# Patient Record
Sex: FEMALE | Race: BLACK OR AFRICAN AMERICAN | NOT HISPANIC OR LATINO | Employment: OTHER | RURAL
[De-identification: names, ages, dates, MRNs, and addresses within clinical notes are randomized per-mention and may not be internally consistent; named-entity substitution may affect disease eponyms.]

---

## 2017-01-09 LAB — CRC RECOMMENDATION EXT: NORMAL

## 2020-06-02 ENCOUNTER — HISTORICAL (OUTPATIENT)
Dept: ADMINISTRATIVE | Facility: HOSPITAL | Age: 70
End: 2020-06-02

## 2020-06-03 LAB
LAB AP CLINICAL INFORMATION: NORMAL
LAB AP DIAGNOSIS - HISTORICAL: NORMAL
LAB AP GROSS PATHOLOGY - HISTORICAL: NORMAL
LAB AP SPECIMEN SUBMITTED - HISTORICAL: NORMAL

## 2020-06-15 ENCOUNTER — HISTORICAL (OUTPATIENT)
Dept: ADMINISTRATIVE | Facility: HOSPITAL | Age: 70
End: 2020-06-15

## 2021-06-21 ENCOUNTER — HOSPITAL ENCOUNTER (OUTPATIENT)
Dept: RADIOLOGY | Facility: HOSPITAL | Age: 71
Discharge: HOME | End: 2021-06-21
Payer: MEDICARE

## 2021-06-21 VITALS — WEIGHT: 217 LBS | BODY MASS INDEX: 34.87 KG/M2 | HEIGHT: 66 IN

## 2021-06-21 DIAGNOSIS — Z12.39 SCREENING FOR BREAST CANCER: ICD-10-CM

## 2021-06-21 PROCEDURE — 77067 SCR MAMMO BI INCL CAD: CPT | Mod: TC

## 2022-08-04 RX ORDER — HYDROCHLOROTHIAZIDE 25 MG/1
25 TABLET ORAL DAILY
COMMUNITY
End: 2022-08-08 | Stop reason: SDUPTHER

## 2022-08-04 RX ORDER — VERAPAMIL HYDROCHLORIDE 240 MG/1
240 TABLET, FILM COATED, EXTENDED RELEASE ORAL DAILY
COMMUNITY
End: 2022-08-08 | Stop reason: SDUPTHER

## 2022-08-04 RX ORDER — CLOBETASOL PROPIONATE 0.5 MG/G
CREAM TOPICAL 2 TIMES DAILY
COMMUNITY
End: 2023-03-27 | Stop reason: SDUPTHER

## 2022-08-04 RX ORDER — OMEPRAZOLE 20 MG/1
20 CAPSULE, DELAYED RELEASE ORAL DAILY
COMMUNITY
End: 2022-08-08 | Stop reason: DRUGHIGH

## 2022-08-04 RX ORDER — GLIPIZIDE 2.5 MG/1
5 TABLET, EXTENDED RELEASE ORAL
COMMUNITY
End: 2022-08-08 | Stop reason: SDUPTHER

## 2022-08-04 RX ORDER — FLUTICASONE PROPIONATE 50 MCG
2 SPRAY, SUSPENSION (ML) NASAL DAILY
COMMUNITY
End: 2022-08-08 | Stop reason: SDUPTHER

## 2022-08-04 RX ORDER — LORATADINE 10 MG/1
10 TABLET ORAL DAILY
COMMUNITY
End: 2022-08-08

## 2022-08-04 RX ORDER — POTASSIUM CHLORIDE 750 MG/1
10 TABLET, EXTENDED RELEASE ORAL DAILY
COMMUNITY
End: 2022-08-08 | Stop reason: SDUPTHER

## 2022-08-04 RX ORDER — LISINOPRIL 10 MG/1
10 TABLET ORAL DAILY
COMMUNITY
End: 2022-08-08 | Stop reason: SDUPTHER

## 2022-08-04 RX ORDER — IBUPROFEN 800 MG/1
800 TABLET ORAL 3 TIMES DAILY PRN
COMMUNITY
End: 2022-10-14

## 2022-08-04 RX ORDER — PRAVASTATIN SODIUM 20 MG/1
20 TABLET ORAL NIGHTLY
COMMUNITY
End: 2022-08-08 | Stop reason: SDUPTHER

## 2022-08-04 RX ORDER — POLYETHYLENE GLYCOL 3350 17 G/17G
17 POWDER, FOR SOLUTION ORAL DAILY
COMMUNITY
End: 2024-03-26 | Stop reason: SDUPTHER

## 2022-08-04 RX ORDER — ASPIRIN 81 MG/1
81 TABLET ORAL DAILY
COMMUNITY
End: 2024-03-26 | Stop reason: SDUPTHER

## 2022-08-04 RX ORDER — ONDANSETRON 4 MG/1
4 TABLET, FILM COATED ORAL EVERY 8 HOURS PRN
COMMUNITY
End: 2022-08-08 | Stop reason: ALTCHOICE

## 2022-08-08 ENCOUNTER — OFFICE VISIT (OUTPATIENT)
Dept: FAMILY MEDICINE | Facility: CLINIC | Age: 72
End: 2022-08-08
Payer: OTHER GOVERNMENT

## 2022-08-08 VITALS
WEIGHT: 209 LBS | HEIGHT: 66 IN | BODY MASS INDEX: 33.59 KG/M2 | DIASTOLIC BLOOD PRESSURE: 85 MMHG | SYSTOLIC BLOOD PRESSURE: 130 MMHG | RESPIRATION RATE: 16 BRPM | HEART RATE: 85 BPM

## 2022-08-08 DIAGNOSIS — E78.5 HYPERLIPIDEMIA, UNSPECIFIED HYPERLIPIDEMIA TYPE: ICD-10-CM

## 2022-08-08 DIAGNOSIS — E11.9 TYPE 2 DIABETES MELLITUS WITHOUT COMPLICATION, WITHOUT LONG-TERM CURRENT USE OF INSULIN: ICD-10-CM

## 2022-08-08 DIAGNOSIS — Z11.59 SCREENING FOR VIRAL DISEASE: ICD-10-CM

## 2022-08-08 DIAGNOSIS — I10 HYPERTENSION, UNSPECIFIED TYPE: Primary | ICD-10-CM

## 2022-08-08 LAB
ALBUMIN SERPL BCP-MCNC: 4 G/DL (ref 3.5–5)
ALBUMIN/GLOB SERPL: 0.9 {RATIO}
ALP SERPL-CCNC: 70 U/L (ref 55–142)
ALT SERPL W P-5'-P-CCNC: 24 U/L (ref 13–56)
ANION GAP SERPL CALCULATED.3IONS-SCNC: 11 MMOL/L (ref 7–16)
AST SERPL W P-5'-P-CCNC: 16 U/L (ref 15–37)
BASOPHILS # BLD AUTO: 0.04 K/UL (ref 0–0.2)
BASOPHILS NFR BLD AUTO: 0.6 % (ref 0–1)
BILIRUB SERPL-MCNC: 0.7 MG/DL (ref 0–1.2)
BUN SERPL-MCNC: 14 MG/DL (ref 7–18)
BUN/CREAT SERPL: 14 (ref 6–20)
CALCIUM SERPL-MCNC: 9.3 MG/DL (ref 8.5–10.1)
CHLORIDE SERPL-SCNC: 106 MMOL/L (ref 98–107)
CHOLEST SERPL-MCNC: 162 MG/DL (ref 0–200)
CHOLEST/HDLC SERPL: 2.7 {RATIO}
CO2 SERPL-SCNC: 27 MMOL/L (ref 21–32)
CREAT SERPL-MCNC: 0.99 MG/DL (ref 0.55–1.02)
DIFFERENTIAL METHOD BLD: ABNORMAL
EGFR (NO RACE VARIABLE) (RUSH/TITUS): 61 ML/MIN/1.73M²
EOSINOPHIL # BLD AUTO: 0.02 K/UL (ref 0–0.5)
EOSINOPHIL NFR BLD AUTO: 0.3 % (ref 1–4)
ERYTHROCYTE [DISTWIDTH] IN BLOOD BY AUTOMATED COUNT: 12.1 % (ref 11.5–14.5)
GLOBULIN SER-MCNC: 4.5 G/DL (ref 2–4)
GLUCOSE SERPL-MCNC: 126 MG/DL (ref 74–106)
HCT VFR BLD AUTO: 43.7 % (ref 38–47)
HCV AB SER QL: NORMAL
HDLC SERPL-MCNC: 60 MG/DL (ref 40–60)
HGB BLD-MCNC: 14.6 G/DL (ref 12–16)
IMM GRANULOCYTES # BLD AUTO: 0.02 K/UL (ref 0–0.04)
IMM GRANULOCYTES NFR BLD: 0.3 % (ref 0–0.4)
LDLC SERPL CALC-MCNC: 88 MG/DL
LDLC/HDLC SERPL: 1.5 {RATIO}
LYMPHOCYTES # BLD AUTO: 2.09 K/UL (ref 1–4.8)
LYMPHOCYTES NFR BLD AUTO: 31.2 % (ref 27–41)
MCH RBC QN AUTO: 31.2 PG (ref 27–31)
MCHC RBC AUTO-ENTMCNC: 33.4 G/DL (ref 32–36)
MCV RBC AUTO: 93.4 FL (ref 80–96)
MONOCYTES # BLD AUTO: 0.33 K/UL (ref 0–0.8)
MONOCYTES NFR BLD AUTO: 4.9 % (ref 2–6)
MPC BLD CALC-MCNC: 11.1 FL (ref 9.4–12.4)
NEUTROPHILS # BLD AUTO: 4.2 K/UL (ref 1.8–7.7)
NEUTROPHILS NFR BLD AUTO: 62.7 % (ref 53–65)
NONHDLC SERPL-MCNC: 102 MG/DL
NRBC # BLD AUTO: 0 X10E3/UL
NRBC, AUTO (.00): 0 %
PLATELET # BLD AUTO: 278 K/UL (ref 150–400)
POTASSIUM SERPL-SCNC: 3.8 MMOL/L (ref 3.5–5.1)
PROT SERPL-MCNC: 8.5 G/DL (ref 6.4–8.2)
RBC # BLD AUTO: 4.68 M/UL (ref 4.2–5.4)
SODIUM SERPL-SCNC: 140 MMOL/L (ref 136–145)
TRIGL SERPL-MCNC: 68 MG/DL (ref 35–150)
VLDLC SERPL-MCNC: 14 MG/DL
WBC # BLD AUTO: 6.7 K/UL (ref 4.5–11)

## 2022-08-08 PROCEDURE — 82570 MICROALBUMIN / CREATININE RATIO URINE: ICD-10-PCS | Mod: ,,, | Performed by: CLINICAL MEDICAL LABORATORY

## 2022-08-08 PROCEDURE — 80053 COMPREHENSIVE METABOLIC PANEL: ICD-10-PCS | Mod: ,,, | Performed by: CLINICAL MEDICAL LABORATORY

## 2022-08-08 PROCEDURE — 80053 COMPREHEN METABOLIC PANEL: CPT | Mod: ,,, | Performed by: CLINICAL MEDICAL LABORATORY

## 2022-08-08 PROCEDURE — 85025 CBC WITH DIFFERENTIAL: ICD-10-PCS | Mod: ,,, | Performed by: CLINICAL MEDICAL LABORATORY

## 2022-08-08 PROCEDURE — 83036 HEMOGLOBIN GLYCOSYLATED A1C: CPT | Mod: ,,, | Performed by: CLINICAL MEDICAL LABORATORY

## 2022-08-08 PROCEDURE — 86803 HEPATITIS C AB TEST: CPT | Mod: ,,, | Performed by: CLINICAL MEDICAL LABORATORY

## 2022-08-08 PROCEDURE — 82043 UR ALBUMIN QUANTITATIVE: CPT | Mod: ,,, | Performed by: CLINICAL MEDICAL LABORATORY

## 2022-08-08 PROCEDURE — 99203 OFFICE O/P NEW LOW 30 MIN: CPT | Mod: ,,, | Performed by: NURSE PRACTITIONER

## 2022-08-08 PROCEDURE — 80061 LIPID PANEL: ICD-10-PCS | Mod: ,,, | Performed by: CLINICAL MEDICAL LABORATORY

## 2022-08-08 PROCEDURE — 99203 PR OFFICE/OUTPT VISIT, NEW, LEVL III, 30-44 MIN: ICD-10-PCS | Mod: ,,, | Performed by: NURSE PRACTITIONER

## 2022-08-08 PROCEDURE — 85025 COMPLETE CBC W/AUTO DIFF WBC: CPT | Mod: ,,, | Performed by: CLINICAL MEDICAL LABORATORY

## 2022-08-08 PROCEDURE — 82043 MICROALBUMIN / CREATININE RATIO URINE: ICD-10-PCS | Mod: ,,, | Performed by: CLINICAL MEDICAL LABORATORY

## 2022-08-08 PROCEDURE — 82570 ASSAY OF URINE CREATININE: CPT | Mod: ,,, | Performed by: CLINICAL MEDICAL LABORATORY

## 2022-08-08 PROCEDURE — 83036 HEMOGLOBIN A1C: ICD-10-PCS | Mod: ,,, | Performed by: CLINICAL MEDICAL LABORATORY

## 2022-08-08 PROCEDURE — 80061 LIPID PANEL: CPT | Mod: ,,, | Performed by: CLINICAL MEDICAL LABORATORY

## 2022-08-08 PROCEDURE — 86803 HEPATITIS C ANTIBODY: ICD-10-PCS | Mod: ,,, | Performed by: CLINICAL MEDICAL LABORATORY

## 2022-08-08 RX ORDER — PRAVASTATIN SODIUM 20 MG/1
20 TABLET ORAL NIGHTLY
Qty: 90 TABLET | Refills: 1 | Status: SHIPPED | OUTPATIENT
Start: 2022-08-08 | End: 2022-09-08 | Stop reason: SDUPTHER

## 2022-08-08 RX ORDER — OMEPRAZOLE 20 MG/1
20 CAPSULE, DELAYED RELEASE ORAL DAILY
Qty: 90 CAPSULE | Refills: 1 | Status: CANCELLED | OUTPATIENT
Start: 2022-08-08

## 2022-08-08 RX ORDER — VERAPAMIL HYDROCHLORIDE 240 MG/1
240 TABLET, FILM COATED, EXTENDED RELEASE ORAL DAILY
Qty: 90 TABLET | Refills: 1 | Status: SHIPPED | OUTPATIENT
Start: 2022-08-08 | End: 2022-09-08 | Stop reason: SDUPTHER

## 2022-08-08 RX ORDER — LORATADINE 10 MG/1
10 TABLET ORAL DAILY
Qty: 90 TABLET | Refills: 1 | Status: SHIPPED | OUTPATIENT
Start: 2022-08-08 | End: 2022-09-08 | Stop reason: SDUPTHER

## 2022-08-08 RX ORDER — LISINOPRIL 10 MG/1
10 TABLET ORAL DAILY
Qty: 90 TABLET | Refills: 1 | Status: SHIPPED | OUTPATIENT
Start: 2022-08-08 | End: 2022-09-08 | Stop reason: SDUPTHER

## 2022-08-08 RX ORDER — FLUTICASONE PROPIONATE 50 MCG
2 SPRAY, SUSPENSION (ML) NASAL DAILY
Qty: 16 G | Refills: 2 | Status: SHIPPED | OUTPATIENT
Start: 2022-08-08 | End: 2022-09-08 | Stop reason: SDUPTHER

## 2022-08-08 RX ORDER — PANTOPRAZOLE SODIUM 40 MG/1
40 TABLET, DELAYED RELEASE ORAL DAILY
Qty: 90 TABLET | Refills: 1 | Status: SHIPPED | OUTPATIENT
Start: 2022-08-08 | End: 2022-09-08 | Stop reason: SDUPTHER

## 2022-08-08 RX ORDER — POTASSIUM CHLORIDE 750 MG/1
10 TABLET, EXTENDED RELEASE ORAL DAILY
Qty: 90 TABLET | Refills: 1 | Status: SHIPPED | OUTPATIENT
Start: 2022-08-08 | End: 2022-09-08 | Stop reason: SDUPTHER

## 2022-08-08 RX ORDER — GLIPIZIDE 2.5 MG/1
5 TABLET, EXTENDED RELEASE ORAL
Qty: 90 TABLET | Refills: 1 | Status: SHIPPED | OUTPATIENT
Start: 2022-08-08 | End: 2022-09-08 | Stop reason: SDUPTHER

## 2022-08-08 RX ORDER — HYDROCHLOROTHIAZIDE 25 MG/1
25 TABLET ORAL DAILY
Qty: 90 TABLET | Refills: 1 | Status: SHIPPED | OUTPATIENT
Start: 2022-08-08 | End: 2022-09-08 | Stop reason: SDUPTHER

## 2022-08-08 NOTE — PROGRESS NOTES
TU Sanchez   [facility] [address] [place] [address]  [phone]      PATIENT NAME: Judy Miller  : 1950  DATE: 22  MRN: [id number]      Billing Provider: TU Sanchez  Level of Service:   Patient PCP Information     Provider PCP Type    TU Sanchez General          Reason for Visit / Chief Complaint: Establish Care, Hypertension, Diabetes, and Hyperlipidemia (Here to establish care)       Update PCP  Update Chief Complaint         History of Present Illness / Problem Focused Workflow     Judy Miller presents to the clinic with Establish Care, Hypertension, Diabetes, and Hyperlipidemia (Here to establish care)     Pt presents to establish care. She was previously followed by Dr Whitt but is wanting to transition her care to our clinic. She has no complaints at this time. Her home meds and medical history were reviewed.    PMH:  htn  hld  Dm  No hx renal failure  No hx cad/mi or cva    PSH  Hip replacement years ago    Social:  Recently lost her  of 54yrs   Has 4 children all live out of town  Denies tobacco use  Denies ETOH  Denies drugs    Family Hx:  Mother  age 79 of CAD  Father  at 88 of MI    Health Maintenance  Reports had a colonoscopy 6yrs ago at [facility] (repeat due in 4 years)  Reports previous Dexa Scan at [facility] (date unknown)  MMG scheduled next week at [facility]        Review of Systems     Review of Systems   Constitutional: Positive for fatigue.   Eyes: Negative for visual disturbance.   Respiratory: Negative for chest tightness and shortness of breath.    Cardiovascular: Negative for chest pain and leg swelling.   Gastrointestinal: Positive for reflux. Negative for abdominal pain, change in bowel habit and change in bowel habit.   Endocrine: Negative for polydipsia, polyphagia and polyuria.   Genitourinary: Negative for dysuria and hematuria.    Musculoskeletal: Negative for back pain and leg pain.   Neurological: Negative for dizziness, syncope, weakness and light-headedness.        Medical / Social / Family History     Past Medical History:   Diagnosis Date    Diabetes mellitus, type 2     GERD (gastroesophageal reflux disease)     Hyperlipidemia     Hypertension        Past Surgical History:   Procedure Laterality Date    HIP REPLACEMENT ARTHROPLASTY Left        Social History  Ms. Miller  reports that she has never smoked. She has never used smokeless tobacco. She reports that she does not drink alcohol and does not use drugs.    Family History  Ms. Miller's family history includes Diabetes in her mother and sister; Heart disease in her mother; Hypertension in her mother.    Medications and Allergies     Medications  Outpatient Medications Marked as Taking for the 8/8/22 encounter (Office Visit) with TU Sanchez   Medication Sig Dispense Refill    aspirin (ECOTRIN) 81 MG EC tablet Take 81 mg by mouth once daily.      clobetasoL (TEMOVATE) 0.05 % cream Apply topically 2 (two) times daily. Apply a thin layer to the affected area(s)      ibuprofen (ADVIL,MOTRIN) 800 MG tablet Take 800 mg by mouth 3 (three) times daily as needed for Pain (Do not take with Naproxen).      polyethylene glycol (GLYCOLAX) 17 gram PwPk Take 17 g by mouth once daily. Mixed with 8 oz water, juice, soda, coffee or tea      [DISCONTINUED] fluticasone propionate (FLONASE) 50 mcg/actuation nasal spray 2 sprays by Each Nostril route once daily.      [DISCONTINUED] glipiZIDE (GLUCOTROL) 2.5 MG TR24 Take 5 mg by mouth daily with breakfast.      [DISCONTINUED] hydroCHLOROthiazide (HYDRODIURIL) 25 MG tablet Take 25 mg by mouth once daily.      [DISCONTINUED] lisinopriL 10 MG tablet Take 10 mg by mouth once daily.      [DISCONTINUED] omeprazole (PRILOSEC) 20 MG capsule Take 20 mg by mouth once daily.      [DISCONTINUED] potassium chloride SA (K-DUR,KLOR-CON M) 10  MEQ tablet Take 10 mEq by mouth once daily. With food      [DISCONTINUED] pravastatin (PRAVACHOL) 20 MG tablet Take 20 mg by mouth every evening.      [DISCONTINUED] verapamiL (CALAN-SR) 240 MG CR tablet Take 240 mg by mouth once daily. With food         Allergies  Review of patient's allergies indicates:   Allergen Reactions    Pneumococcal vaccine Swelling       Physical Examination     Vitals:    08/08/22 1003   BP: 130/85   Pulse: 85   Resp: 16     Physical Exam  Eyes:      Pupils: Pupils are equal, round, and reactive to light.   Cardiovascular:      Rate and Rhythm: Normal rate and regular rhythm.      Heart sounds: Normal heart sounds. No murmur heard.  Pulmonary:      Breath sounds: Normal breath sounds. No wheezing, rhonchi or rales.   Abdominal:      General: Bowel sounds are normal.   Musculoskeletal:         General: No swelling.      Cervical back: Normal range of motion and neck supple.   Skin:     General: Skin is warm and dry.   Neurological:      Mental Status: She is alert and oriented to person, place, and time.          Assessment and Plan (including Health Maintenance)      Problem List  Smart Sets  Document Outside HM   :    Plan:         Health Maintenance Due   Topic Date Due    Hepatitis C Screening  Never done    Lipid Panel  Never done    DEXA Scan  Never done    Colorectal Cancer Screening  Never done    Shingles Vaccine (1 of 2) Never done    Pneumococcal Vaccines (Age 65+) (1 - PCV) Never done    COVID-19 Vaccine (4 - Booster for Moderna series) 03/05/2022    Mammogram  06/21/2022       Problem List Items Addressed This Visit    None     Visit Diagnoses     Hypertension, unspecified type    -  Primary    bp appears well controlled today   cont home meds     Relevant Orders    CBC Auto Differential    Comprehensive Metabolic Panel    Hyperlipidemia, unspecified hyperlipidemia type        cont home meds  lab today     Relevant Orders    Lipid Panel    Type 2 diabetes mellitus  without complication, without long-term current use of insulin        lab today   cont home meds    Relevant Medications    glipiZIDE (GLUCOTROL) 2.5 MG TR24    Other Relevant Orders    Hemoglobin A1C    Microalbumin/Creatinine Ratio, Urine    Screening for viral disease        Relevant Orders    Hepatitis C Antibody          Health Maintenance Topics with due status: Not Due       Topic Last Completion Date    TETANUS VACCINE 01/04/2013    Influenza Vaccine Not Due       Future Appointments   Date Time Provider Department Center   8/15/2022  1:00 PM [facility] [place]   9/1/2022  8:00 AM AWV NURSE, [facility] [place]   11/28/2022  8:20 AM TU Sanchez [facility] [place]            Signature:  TU Sanchez  [facility] [address] [place] [address]  [phone]    Date of encounter: 8/8/22

## 2022-08-09 LAB
CREAT UR-MCNC: 81 MG/DL (ref 28–219)
EST. AVERAGE GLUCOSE BLD GHB EST-MCNC: 90 MG/DL
HBA1C MFR BLD HPLC: 5.3 % (ref 4.5–6.6)
MICROALBUMIN UR-MCNC: 0.7 MG/DL (ref 0–2.8)
MICROALBUMIN/CREAT RATIO PNL UR: 8.6 MG/G (ref 0–30)

## 2022-08-15 ENCOUNTER — HOSPITAL ENCOUNTER (OUTPATIENT)
Dept: RADIOLOGY | Facility: HOSPITAL | Age: 72
Discharge: HOME | End: 2022-08-15
Attending: RADIOLOGY
Payer: MEDICARE

## 2022-08-15 DIAGNOSIS — Z12.31 VISIT FOR SCREENING MAMMOGRAM: ICD-10-CM

## 2022-08-15 PROCEDURE — 77067 SCR MAMMO BI INCL CAD: CPT | Mod: TC

## 2022-08-22 ENCOUNTER — PATIENT OUTREACH (OUTPATIENT)
Dept: FAMILY MEDICINE | Facility: CLINIC | Age: 72
End: 2022-08-22
Payer: MEDICARE

## 2022-09-01 ENCOUNTER — OFFICE VISIT (OUTPATIENT)
Dept: FAMILY MEDICINE | Facility: CLINIC | Age: 72
End: 2022-09-01
Payer: MEDICARE

## 2022-09-01 VITALS
DIASTOLIC BLOOD PRESSURE: 80 MMHG | WEIGHT: 213 LBS | HEIGHT: 66 IN | OXYGEN SATURATION: 96 % | BODY MASS INDEX: 34.23 KG/M2 | HEART RATE: 61 BPM | RESPIRATION RATE: 20 BRPM | SYSTOLIC BLOOD PRESSURE: 138 MMHG | TEMPERATURE: 98 F

## 2022-09-01 DIAGNOSIS — E66.3 OVERWEIGHT: ICD-10-CM

## 2022-09-01 DIAGNOSIS — E11.9 TYPE 2 DIABETES MELLITUS WITHOUT COMPLICATION, WITHOUT LONG-TERM CURRENT USE OF INSULIN: ICD-10-CM

## 2022-09-01 DIAGNOSIS — I10 HYPERTENSION, UNSPECIFIED TYPE: ICD-10-CM

## 2022-09-01 DIAGNOSIS — Z00.00 ENCOUNTER FOR SUBSEQUENT ANNUAL WELLNESS VISIT (AWV) IN MEDICARE PATIENT: Primary | ICD-10-CM

## 2022-09-01 DIAGNOSIS — E78.5 HYPERLIPIDEMIA, UNSPECIFIED HYPERLIPIDEMIA TYPE: ICD-10-CM

## 2022-09-01 PROCEDURE — 1126F PR PAIN SEVERITY QUANTIFIED, NO PAIN PRESENT: ICD-10-PCS | Mod: ,,, | Performed by: NURSE PRACTITIONER

## 2022-09-01 PROCEDURE — 1160F PR REVIEW ALL MEDS BY PRESCRIBER/CLIN PHARMACIST DOCUMENTED: ICD-10-PCS | Mod: ,,, | Performed by: NURSE PRACTITIONER

## 2022-09-01 PROCEDURE — 4010F PR ACE/ARB THEARPY RXD/TAKEN: ICD-10-PCS | Mod: ,,, | Performed by: NURSE PRACTITIONER

## 2022-09-01 PROCEDURE — 3008F PR BODY MASS INDEX (BMI) DOCUMENTED: ICD-10-PCS | Mod: ,,, | Performed by: NURSE PRACTITIONER

## 2022-09-01 PROCEDURE — 3044F HG A1C LEVEL LT 7.0%: CPT | Mod: ,,, | Performed by: NURSE PRACTITIONER

## 2022-09-01 PROCEDURE — 1160F RVW MEDS BY RX/DR IN RCRD: CPT | Mod: ,,, | Performed by: NURSE PRACTITIONER

## 2022-09-01 PROCEDURE — 1126F AMNT PAIN NOTED NONE PRSNT: CPT | Mod: ,,, | Performed by: NURSE PRACTITIONER

## 2022-09-01 PROCEDURE — 3075F PR MOST RECENT SYSTOLIC BLOOD PRESS GE 130-139MM HG: ICD-10-PCS | Mod: ,,, | Performed by: NURSE PRACTITIONER

## 2022-09-01 PROCEDURE — 3008F BODY MASS INDEX DOCD: CPT | Mod: ,,, | Performed by: NURSE PRACTITIONER

## 2022-09-01 PROCEDURE — 3075F SYST BP GE 130 - 139MM HG: CPT | Mod: ,,, | Performed by: NURSE PRACTITIONER

## 2022-09-01 PROCEDURE — 1101F PT FALLS ASSESS-DOCD LE1/YR: CPT | Mod: ,,, | Performed by: NURSE PRACTITIONER

## 2022-09-01 PROCEDURE — 1159F MED LIST DOCD IN RCRD: CPT | Mod: ,,, | Performed by: NURSE PRACTITIONER

## 2022-09-01 PROCEDURE — 3079F DIAST BP 80-89 MM HG: CPT | Mod: ,,, | Performed by: NURSE PRACTITIONER

## 2022-09-01 PROCEDURE — 3066F NEPHROPATHY DOC TX: CPT | Mod: ,,, | Performed by: NURSE PRACTITIONER

## 2022-09-01 PROCEDURE — 3079F PR MOST RECENT DIASTOLIC BLOOD PRESSURE 80-89 MM HG: ICD-10-PCS | Mod: ,,, | Performed by: NURSE PRACTITIONER

## 2022-09-01 PROCEDURE — 3288F FALL RISK ASSESSMENT DOCD: CPT | Mod: ,,, | Performed by: NURSE PRACTITIONER

## 2022-09-01 PROCEDURE — G0439 PR MEDICARE ANNUAL WELLNESS SUBSEQUENT VISIT: ICD-10-PCS | Mod: ,,, | Performed by: NURSE PRACTITIONER

## 2022-09-01 PROCEDURE — G0439 PPPS, SUBSEQ VISIT: HCPCS | Mod: ,,, | Performed by: NURSE PRACTITIONER

## 2022-09-01 PROCEDURE — 1159F PR MEDICATION LIST DOCUMENTED IN MEDICAL RECORD: ICD-10-PCS | Mod: ,,, | Performed by: NURSE PRACTITIONER

## 2022-09-01 PROCEDURE — 4010F ACE/ARB THERAPY RXD/TAKEN: CPT | Mod: ,,, | Performed by: NURSE PRACTITIONER

## 2022-09-01 PROCEDURE — 3044F PR MOST RECENT HEMOGLOBIN A1C LEVEL <7.0%: ICD-10-PCS | Mod: ,,, | Performed by: NURSE PRACTITIONER

## 2022-09-01 PROCEDURE — 3066F PR DOCUMENTATION OF TREATMENT FOR NEPHROPATHY: ICD-10-PCS | Mod: ,,, | Performed by: NURSE PRACTITIONER

## 2022-09-01 PROCEDURE — 3061F PR NEG MICROALBUMINURIA RESULT DOCUMENTED/REVIEW: ICD-10-PCS | Mod: ,,, | Performed by: NURSE PRACTITIONER

## 2022-09-01 PROCEDURE — 1101F PR PT FALLS ASSESS DOC 0-1 FALLS W/OUT INJ PAST YR: ICD-10-PCS | Mod: ,,, | Performed by: NURSE PRACTITIONER

## 2022-09-01 PROCEDURE — 3288F PR FALLS RISK ASSESSMENT DOCUMENTED: ICD-10-PCS | Mod: ,,, | Performed by: NURSE PRACTITIONER

## 2022-09-01 PROCEDURE — 3061F NEG MICROALBUMINURIA REV: CPT | Mod: ,,, | Performed by: NURSE PRACTITIONER

## 2022-09-01 NOTE — PATIENT INSTRUCTIONS
Counseling and Referral of Other Preventative  (Italic type indicates deductible and co-insurance are waived)    Patient Name: Judy Miller  Today's Date: 9/1/2022    Health Maintenance       Date Due Completion Date    DEXA Scan 09/01/2022 (Originally 2/3/1991) ---    Influenza Vaccine (1) 10/01/2022 (Originally 9/1/2022) ---    Shingles Vaccine (1 of 2) 12/01/2022 (Originally 2/3/2001) ---    COVID-19 Vaccine (4 - Booster for Moderna series) 09/01/2023 (Originally 3/5/2022) 11/5/2021    TETANUS VACCINE 01/04/2023 1/4/2013    Mammogram 08/15/2023 8/15/2022    Lipid Panel 08/08/2027 8/8/2022    Colorectal Cancer Screening 08/20/2027 1/9/2017        No orders of the defined types were placed in this encounter.    The following information is provided to all patients.  This information is to help you find resources for any of the problems found today that may be affecting your health:                Living healthy guide: www.[facility].gov      Understanding Diabetes: www.diabetes.org      Eating healthy: www.cdc.gov/healthyweight      CDC home safety checklist: www.cdc.gov/steadi/patient.html      Agency on Aging: www.goea.[place].[facility]      Alcoholics anonymous (AA): www.aa.org      Physical Activity: www.key.nih.gov/qd9qwzy      Tobacco use: www.quitwithusla.org

## 2022-09-01 NOTE — PROGRESS NOTES
[place] [facility]       PATIENT NAME: Judy Miller   : 1950    AGE: 72 y.o. DATE: 2022   MRN: [id number]        Reason for Visit / Chief Complaint: Medicare AWV Follow Up ([employer] WELLNESS SUBSEQUENT VISIT)        Judy Miller presents for an Subsequent Medicare AWV today.     The following components were reviewed and updated:    Medical/Social/Family History:  Past Medical History:   Diagnosis Date    Diabetes mellitus, type 2     GERD (gastroesophageal reflux disease)     Hyperlipidemia     Hypertension         Family History   Problem Relation Age of Onset    Hypertension Mother     Heart disease Mother     Diabetes Mother     Diabetes Sister         Social History     Tobacco Use   Smoking Status Never   Smokeless Tobacco Never      Social History     Substance and Sexual Activity   Alcohol Use Not Currently    Comment: Occasional use       Family History   Problem Relation Age of Onset    Hypertension Mother     Heart disease Mother     Diabetes Mother     Diabetes Sister        Past Surgical History:   Procedure Laterality Date    HIP REPLACEMENT ARTHROPLASTY Left     TUBAL LIGATION           Allergies and Current Medications     Review of patient's allergies indicates:   Allergen Reactions    Pneumococcal vaccine Swelling       Current Outpatient Medications:     aspirin (ECOTRIN) 81 MG EC tablet, Take 81 mg by mouth once daily., Disp: , Rfl:     clobetasoL (TEMOVATE) 0.05 % cream, Apply topically 2 (two) times daily. Apply a thin layer to the affected area(s), Disp: , Rfl:     fluticasone propionate (FLONASE) 50 mcg/actuation nasal spray, 2 sprays (100 mcg total) by Each Nostril route once daily., Disp: 16 g, Rfl: 2    glipiZIDE (GLUCOTROL) 2.5 MG TR24, Take 2 tablets (5 mg total) by mouth daily with breakfast., Disp: 90 tablet, Rfl: 1    hydroCHLOROthiazide (HYDRODIURIL) 25 MG tablet, Take 1 tablet (25 mg total) by mouth once daily.,  Disp: 90 tablet, Rfl: 1    ibuprofen (ADVIL,MOTRIN) 800 MG tablet, Take 800 mg by mouth 3 (three) times daily as needed for Pain (Do not take with Naproxen)., Disp: , Rfl:     lisinopriL 10 MG tablet, Take 1 tablet (10 mg total) by mouth once daily., Disp: 90 tablet, Rfl: 1    loratadine (CLARITIN) 10 mg tablet, Take 1 tablet (10 mg total) by mouth once daily., Disp: 90 tablet, Rfl: 1    pantoprazole (PROTONIX) 40 MG tablet, Take 1 tablet (40 mg total) by mouth once daily., Disp: 90 tablet, Rfl: 1    polyethylene glycol (GLYCOLAX) 17 gram PwPk, Take 17 g by mouth once daily. Mixed with 8 oz water, juice, soda, coffee or tea, Disp: , Rfl:     potassium chloride SA (K-DUR,KLOR-CON M) 10 MEQ tablet, Take 1 tablet (10 mEq total) by mouth once daily. With food, Disp: 90 tablet, Rfl: 1    pravastatin (PRAVACHOL) 20 MG tablet, Take 1 tablet (20 mg total) by mouth every evening., Disp: 90 tablet, Rfl: 1    verapamiL (CALAN-SR) 240 MG CR tablet, Take 1 tablet (240 mg total) by mouth once daily. With food, Disp: 90 tablet, Rfl: 1    Health Risk Assessment   Fall Risk: No   Obesity: BMI 34.38     Advance Directive:  X  Patient has advanced directives written and agrees to provide copies to the institution.   Depression: PHQ9 -0    HTN: 138/80    T2DM: A1C- 5.3    Tobacco use: Denies tobacco use  STI: Not at risk    Alcohol misuse: Reports occasional use Cage Score: N/A   Statin Use: Continue statin use. Encouraged heart healthy diet & exercise   Mini Co/5      Health Risk Assessment  What is your age?: 70-79  Are you male or female?: Female  During the past four weeks, how much have you been bothered by emotional problems such as feeling anxious, depressed, irritable, sad, or downhearted and blue?: Not at all  During the past five weeks, has your physical and/or emotional health limited your social activities with family, friends, neighbors, or groups?: Not at all  During the past four weeks, how much bodily pain have you  generally had?: No pain  During the past four weeks, was someone available to help if you needed and wanted help?: Yes, as much as I wanted  During the past four weeks, what was the hardest physical activity you could do for at least two minutes?: Light  Can you get to places out of walking distance without help?  (For example, can you travel alone on buses or taxis, or drive your own car?): Yes  Can you go shopping for groceries or clothes without someone's help?: Yes  Can you prepare your own meals?: Yes  Can you do your own housework without help?: Yes  Because of any health problems, do you need the help of another person with your personal care needs such as eating, bathing, dressing, or getting around the house?: No  Can you handle your own money without help?: Yes  During the past four weeks, how would you rate your health in general?: Very good  How have things been going for you during the past four weeks?: Pretty well  Are you having difficulties driving your car?: No  Do you always fasten your seat belt when you are in a car?: Yes, usually  How often in the past four weeks have you been bothered by falling or dizzy when standing up?: Never  How often in the past four weeks have you been bothered by sexual problems?: Never  How often in the past four weeks have you been bothered by trouble eating well?: Never  How often in the past four weeks have you been bothered by teeth or denture problems?: Never  How often in the past four weeks have you been bothered with problems using the telephone?: Never  How often in the past four weeks have you been bothered by tiredness or fatigue?: Never  Have you fallen two or more times in the past year?: No  Are you afraid of falling?: No  Are you a smoker?: No  During the past four weeks, how many drinks of wine, beer, or other alcoholic beverages did you have?: One drink or less per week  Do you exercise for about 20 minutes three or more days a week?: Yes, some of the  time  Have you been given any information to help you with hazards in your house that might hurt you?: Yes  Have you been given any information to help you with keeping track of your medications?: Yes  How often do you have trouble taking medicines the way you've been told to take them?: I always take them as prescribed  How confident are you that you can control and manage most of your health problems?: Very confident  What is your race? (Check all that apply.):     Health Maintenance   Last eye exam: 2022   Last CV screen with lipids: 08/08/2022   Diabetes screening with fasting glucose or A1c: 08/08/2022   Colonoscopy: 01/09/2017- Repeat in 10 years. Due again 2027   Flu Vaccine: Out of season   Pneumonia vaccines: Allergy   COVID vaccine: 02/05/2021; 03/04/2021; 11/05/2021   Hep B vaccine:  Not at risk   DEXA: Discussed with pt but she declines at this time   Last pap/pelvic: Advanced age   Last Mammogram: 08/15/2022- Negative   Last PSA screen: N/A   AAA screening: N/A (once in lifetime for males 65-75 who have smoked > 100 cigarettes in lifetime)  HIV Screeing: N/A  Hepatitis C Screen: 08/08/2022- Nonreactive  Low Dose CT Scan: N/A    Health Maintenance Topics with due status: Not Due       Topic Last Completion Date    TETANUS VACCINE 01/04/2013    Colorectal Cancer Screening 01/09/2017    Lipid Panel 08/08/2022    Mammogram 08/15/2022     There are no preventive care reminders to display for this patient.    Incontinence  Bowel: No  Bladder: No    Lab results available in Epic or see dates from [facility] above:   Lab Results   Component Value Date    CHOL 162 08/08/2022     Lab Results   Component Value Date    HDL 60 08/08/2022     Lab Results   Component Value Date    LDLCALC 88 08/08/2022     Lab Results   Component Value Date    TRIG 68 08/08/2022     Lab Results   Component Value Date    CHOLHDL 2.7 08/08/2022       Lab Results   Component Value Date    HGBA1C 5.3 08/08/2022       Sodium  "  Date Value Ref Range Status   08/08/2022 140 136 - 145 mmol/L Final     Potassium   Date Value Ref Range Status   08/08/2022 3.8 3.5 - 5.1 mmol/L Final     Chloride   Date Value Ref Range Status   08/08/2022 106 98 - 107 mmol/L Final     CO2   Date Value Ref Range Status   08/08/2022 27 21 - 32 mmol/L Final     Glucose   Date Value Ref Range Status   08/08/2022 126 (H) 74 - 106 mg/dL Final     BUN   Date Value Ref Range Status   08/08/2022 14 7 - 18 mg/dL Final     Creatinine   Date Value Ref Range Status   08/08/2022 0.99 0.55 - 1.02 mg/dL Final     Calcium   Date Value Ref Range Status   08/08/2022 9.3 8.5 - 10.1 mg/dL Final     Total Protein   Date Value Ref Range Status   08/08/2022 8.5 (H) 6.4 - 8.2 g/dL Final     Albumin   Date Value Ref Range Status   08/08/2022 4.0 3.5 - 5.0 g/dL Final     Bilirubin, Total   Date Value Ref Range Status   08/08/2022 0.7 0.0 - 1.2 mg/dL Final     Alk Phos   Date Value Ref Range Status   08/08/2022 70 55 - 142 U/L Final     AST   Date Value Ref Range Status   08/08/2022 16 15 - 37 U/L Final     ALT   Date Value Ref Range Status   08/08/2022 24 13 - 56 U/L Final     Anion Gap   Date Value Ref Range Status   08/08/2022 11 7 - 16 mmol/L Final         Care Team   PCP: TU Mills           **See Completed Assessments for Annual Wellness visit within the encounter summary    The following assessments were completed & reviewed:  Depression Screening  Cognitive function Screening  Timed Get Up Test  Whisper Test  Vision Screen  Health Risk Assessment  Checklist of ADLs and IADLs      Objective  Vitals:    09/01/22 0814   BP: 138/80   Pulse: 61   Resp: 20   Temp: 98.2 °F (36.8 °C)   TempSrc: Oral   SpO2: 96%   Weight: 96.6 kg (213 lb)   Height: 5' 6" (1.676 m)   PainSc: 0-No pain      Body mass index is 34.38 kg/m².  Ideal body weight: 59.3 kg (130 lb 11.7 oz)     Physical Exam  Constitutional:       General: She is not in acute distress.     Appearance: Normal appearance. "   HENT:      Head: Normocephalic.      Right Ear: Tympanic membrane normal.      Left Ear: Tympanic membrane normal.      Nose: No congestion or rhinorrhea.   Eyes:      Pupils: Pupils are equal, round, and reactive to light.   Cardiovascular:      Rate and Rhythm: Normal rate and regular rhythm.      Heart sounds: Normal heart sounds. No murmur heard.  Pulmonary:      Effort: Pulmonary effort is normal.      Breath sounds: Normal breath sounds.   Abdominal:      General: Bowel sounds are normal. There is no distension.      Hernia: No hernia is present.   Musculoskeletal:         General: No swelling or tenderness.      Right lower leg: No edema.      Left lower leg: No edema.   Skin:     General: Skin is warm and dry.   Neurological:      General: No focal deficit present.      Mental Status: She is alert and oriented to person, place, and time.         Assessment:     1. Encounter for subsequent annual wellness visit (AWV) in Medicare patient    2. Hypertension, unspecified type    3. Hyperlipidemia, unspecified hyperlipidemia type    4. Type 2 diabetes mellitus without complication, without long-term current use of insulin    5. BMI 34.0-34.9,adult    6. Overweight         Plan:    Referrals/Orders:  None     Advised to call office if does not hear from anyone with referral appt within 2-3 weeks to check on status of referral. Voiced understanding.    Keep current on appts & continue medications as ordered. Encouraged diet & exercise to decrease weight/BMI.      Discussed and provided with a screening schedule and personal prevention plan in accordance with USPSTF age appropriate recommendations and Medicare screening guidelines.   Education, counseling, and referrals were provided as needed.  After Visit Summary printed and given to patient which includes written education and a list of any referrals if indicated. All education discussed with patient & handouts given to patient.      F/u plan for yearly  AWV.    Signature: TU Mills

## 2022-09-08 RX ORDER — HYDROCHLOROTHIAZIDE 25 MG/1
25 TABLET ORAL DAILY
Qty: 90 TABLET | Refills: 1 | Status: SHIPPED | OUTPATIENT
Start: 2022-09-08 | End: 2022-09-12 | Stop reason: SDUPTHER

## 2022-09-08 RX ORDER — LORATADINE 10 MG/1
10 TABLET ORAL DAILY
Qty: 90 TABLET | Refills: 1 | Status: SHIPPED | OUTPATIENT
Start: 2022-09-08 | End: 2022-09-12 | Stop reason: SDUPTHER

## 2022-09-08 RX ORDER — FLUTICASONE PROPIONATE 50 MCG
2 SPRAY, SUSPENSION (ML) NASAL DAILY
Qty: 16 G | Refills: 2 | Status: SHIPPED | OUTPATIENT
Start: 2022-09-08 | End: 2022-09-12 | Stop reason: SDUPTHER

## 2022-09-08 RX ORDER — PRAVASTATIN SODIUM 20 MG/1
20 TABLET ORAL NIGHTLY
Qty: 90 TABLET | Refills: 1 | Status: SHIPPED | OUTPATIENT
Start: 2022-09-08 | End: 2022-09-12 | Stop reason: SDUPTHER

## 2022-09-08 RX ORDER — LISINOPRIL 10 MG/1
10 TABLET ORAL DAILY
Qty: 90 TABLET | Refills: 1 | Status: SHIPPED | OUTPATIENT
Start: 2022-09-08 | End: 2022-09-12 | Stop reason: SDUPTHER

## 2022-09-08 RX ORDER — VERAPAMIL HYDROCHLORIDE 240 MG/1
240 TABLET, FILM COATED, EXTENDED RELEASE ORAL DAILY
Qty: 90 TABLET | Refills: 1 | Status: SHIPPED | OUTPATIENT
Start: 2022-09-08 | End: 2022-09-12 | Stop reason: SDUPTHER

## 2022-09-08 RX ORDER — GLIPIZIDE 2.5 MG/1
5 TABLET, EXTENDED RELEASE ORAL
Qty: 90 TABLET | Refills: 1 | Status: SHIPPED | OUTPATIENT
Start: 2022-09-08 | End: 2022-09-12 | Stop reason: SDUPTHER

## 2022-09-08 RX ORDER — POTASSIUM CHLORIDE 750 MG/1
10 TABLET, EXTENDED RELEASE ORAL DAILY
Qty: 90 TABLET | Refills: 1 | Status: SHIPPED | OUTPATIENT
Start: 2022-09-08 | End: 2022-09-12 | Stop reason: SDUPTHER

## 2022-09-08 RX ORDER — PANTOPRAZOLE SODIUM 40 MG/1
40 TABLET, DELAYED RELEASE ORAL DAILY
Qty: 90 TABLET | Refills: 1 | Status: SHIPPED | OUTPATIENT
Start: 2022-09-08 | End: 2022-09-12 | Stop reason: SDUPTHER

## 2022-09-12 ENCOUNTER — TELEPHONE (OUTPATIENT)
Dept: FAMILY MEDICINE | Facility: CLINIC | Age: 72
End: 2022-09-12
Payer: MEDICARE

## 2022-09-12 RX ORDER — LISINOPRIL 10 MG/1
10 TABLET ORAL DAILY
Qty: 90 TABLET | Refills: 1 | Status: SHIPPED | OUTPATIENT
Start: 2022-09-12 | End: 2023-03-24 | Stop reason: SDUPTHER

## 2022-09-12 RX ORDER — HYDROCHLOROTHIAZIDE 25 MG/1
25 TABLET ORAL DAILY
Qty: 90 TABLET | Refills: 1 | Status: SHIPPED | OUTPATIENT
Start: 2022-09-12 | End: 2023-03-24 | Stop reason: SDUPTHER

## 2022-09-12 RX ORDER — PRAVASTATIN SODIUM 20 MG/1
20 TABLET ORAL NIGHTLY
Qty: 90 TABLET | Refills: 1 | Status: SHIPPED | OUTPATIENT
Start: 2022-09-12 | End: 2023-03-24 | Stop reason: SDUPTHER

## 2022-09-12 RX ORDER — VERAPAMIL HYDROCHLORIDE 240 MG/1
240 TABLET, FILM COATED, EXTENDED RELEASE ORAL DAILY
Qty: 90 TABLET | Refills: 1 | Status: SHIPPED | OUTPATIENT
Start: 2022-09-12 | End: 2023-03-24 | Stop reason: SDUPTHER

## 2022-09-12 RX ORDER — LORATADINE 10 MG/1
10 TABLET ORAL DAILY
Qty: 90 TABLET | Refills: 1 | Status: SHIPPED | OUTPATIENT
Start: 2022-09-12 | End: 2023-03-24 | Stop reason: SDUPTHER

## 2022-09-12 RX ORDER — PANTOPRAZOLE SODIUM 40 MG/1
40 TABLET, DELAYED RELEASE ORAL DAILY
Qty: 90 TABLET | Refills: 1 | Status: SHIPPED | OUTPATIENT
Start: 2022-09-12 | End: 2023-03-24 | Stop reason: SDUPTHER

## 2022-09-12 RX ORDER — POTASSIUM CHLORIDE 750 MG/1
10 TABLET, EXTENDED RELEASE ORAL DAILY
Qty: 90 TABLET | Refills: 1 | Status: SHIPPED | OUTPATIENT
Start: 2022-09-12 | End: 2023-03-24 | Stop reason: SDUPTHER

## 2022-09-12 RX ORDER — FLUTICASONE PROPIONATE 50 MCG
2 SPRAY, SUSPENSION (ML) NASAL DAILY
Qty: 16 G | Refills: 2 | Status: SHIPPED | OUTPATIENT
Start: 2022-09-12

## 2022-09-12 RX ORDER — GLIPIZIDE 2.5 MG/1
5 TABLET, EXTENDED RELEASE ORAL
Qty: 90 TABLET | Refills: 1 | Status: SHIPPED | OUTPATIENT
Start: 2022-09-12 | End: 2023-03-27 | Stop reason: SDUPTHER

## 2022-09-12 NOTE — TELEPHONE ENCOUNTER
----- Message from Zekeiah Ormond sent at 9/12/2022  3:00 PM CDT -----  Patient is having issues with medication. She was told it was sent to the wrong [facility]. Her number is [phone].

## 2022-09-12 NOTE — TELEPHONE ENCOUNTER
Spoke with pt. Her presc need to go to [facility] pharmacy in [place]. Instructed pt to call the number and get me an address and I can change it in our system

## 2022-09-12 NOTE — TELEPHONE ENCOUNTER
----- Message from Zekeiah Ormond sent at 9/12/2022  9:31 AM CDT -----  Patient stated that she was told by the [facility] [place] That her medication was suppose to be sent to [place].

## 2022-10-05 ENCOUNTER — OFFICE VISIT (OUTPATIENT)
Dept: FAMILY MEDICINE | Facility: CLINIC | Age: 72
End: 2022-10-05
Payer: MEDICARE

## 2022-10-05 VITALS
DIASTOLIC BLOOD PRESSURE: 97 MMHG | HEART RATE: 84 BPM | HEIGHT: 66 IN | OXYGEN SATURATION: 97 % | WEIGHT: 217 LBS | SYSTOLIC BLOOD PRESSURE: 146 MMHG | TEMPERATURE: 98 F | BODY MASS INDEX: 34.87 KG/M2 | RESPIRATION RATE: 18 BRPM

## 2022-10-05 DIAGNOSIS — F41.9 ANXIETY AND DEPRESSION: Primary | ICD-10-CM

## 2022-10-05 DIAGNOSIS — Z23 INFLUENZA VACCINATION ADMINISTERED AT CURRENT VISIT: ICD-10-CM

## 2022-10-05 DIAGNOSIS — F32.A ANXIETY AND DEPRESSION: Primary | ICD-10-CM

## 2022-10-05 PROCEDURE — 1159F PR MEDICATION LIST DOCUMENTED IN MEDICAL RECORD: ICD-10-PCS | Mod: ,,, | Performed by: NURSE PRACTITIONER

## 2022-10-05 PROCEDURE — 90694 VACC AIIV4 NO PRSRV 0.5ML IM: CPT | Mod: ,,, | Performed by: NURSE PRACTITIONER

## 2022-10-05 PROCEDURE — 3066F NEPHROPATHY DOC TX: CPT | Mod: ,,, | Performed by: NURSE PRACTITIONER

## 2022-10-05 PROCEDURE — 1160F RVW MEDS BY RX/DR IN RCRD: CPT | Mod: ,,, | Performed by: NURSE PRACTITIONER

## 2022-10-05 PROCEDURE — 4010F ACE/ARB THERAPY RXD/TAKEN: CPT | Mod: ,,, | Performed by: NURSE PRACTITIONER

## 2022-10-05 PROCEDURE — 3061F PR NEG MICROALBUMINURIA RESULT DOCUMENTED/REVIEW: ICD-10-PCS | Mod: ,,, | Performed by: NURSE PRACTITIONER

## 2022-10-05 PROCEDURE — 3061F NEG MICROALBUMINURIA REV: CPT | Mod: ,,, | Performed by: NURSE PRACTITIONER

## 2022-10-05 PROCEDURE — 1159F MED LIST DOCD IN RCRD: CPT | Mod: ,,, | Performed by: NURSE PRACTITIONER

## 2022-10-05 PROCEDURE — 99213 PR OFFICE/OUTPT VISIT, EST, LEVL III, 20-29 MIN: ICD-10-PCS | Mod: ,,, | Performed by: NURSE PRACTITIONER

## 2022-10-05 PROCEDURE — 3008F PR BODY MASS INDEX (BMI) DOCUMENTED: ICD-10-PCS | Mod: ,,, | Performed by: NURSE PRACTITIONER

## 2022-10-05 PROCEDURE — 4010F PR ACE/ARB THEARPY RXD/TAKEN: ICD-10-PCS | Mod: ,,, | Performed by: NURSE PRACTITIONER

## 2022-10-05 PROCEDURE — 90694 FLU VACCINE - QUADRIVALENT - ADJUVANTED: ICD-10-PCS | Mod: ,,, | Performed by: NURSE PRACTITIONER

## 2022-10-05 PROCEDURE — G0008 FLU VACCINE - QUADRIVALENT - ADJUVANTED: ICD-10-PCS | Mod: ,,, | Performed by: NURSE PRACTITIONER

## 2022-10-05 PROCEDURE — G0008 ADMIN INFLUENZA VIRUS VAC: HCPCS | Mod: ,,, | Performed by: NURSE PRACTITIONER

## 2022-10-05 PROCEDURE — 1160F PR REVIEW ALL MEDS BY PRESCRIBER/CLIN PHARMACIST DOCUMENTED: ICD-10-PCS | Mod: ,,, | Performed by: NURSE PRACTITIONER

## 2022-10-05 PROCEDURE — 3077F PR MOST RECENT SYSTOLIC BLOOD PRESSURE >= 140 MM HG: ICD-10-PCS | Mod: ,,, | Performed by: NURSE PRACTITIONER

## 2022-10-05 PROCEDURE — 3080F DIAST BP >= 90 MM HG: CPT | Mod: ,,, | Performed by: NURSE PRACTITIONER

## 2022-10-05 PROCEDURE — 99213 OFFICE O/P EST LOW 20 MIN: CPT | Mod: ,,, | Performed by: NURSE PRACTITIONER

## 2022-10-05 PROCEDURE — 3008F BODY MASS INDEX DOCD: CPT | Mod: ,,, | Performed by: NURSE PRACTITIONER

## 2022-10-05 PROCEDURE — 3044F PR MOST RECENT HEMOGLOBIN A1C LEVEL <7.0%: ICD-10-PCS | Mod: ,,, | Performed by: NURSE PRACTITIONER

## 2022-10-05 PROCEDURE — 3077F SYST BP >= 140 MM HG: CPT | Mod: ,,, | Performed by: NURSE PRACTITIONER

## 2022-10-05 PROCEDURE — 3080F PR MOST RECENT DIASTOLIC BLOOD PRESSURE >= 90 MM HG: ICD-10-PCS | Mod: ,,, | Performed by: NURSE PRACTITIONER

## 2022-10-05 PROCEDURE — 3044F HG A1C LEVEL LT 7.0%: CPT | Mod: ,,, | Performed by: NURSE PRACTITIONER

## 2022-10-05 PROCEDURE — 3066F PR DOCUMENTATION OF TREATMENT FOR NEPHROPATHY: ICD-10-PCS | Mod: ,,, | Performed by: NURSE PRACTITIONER

## 2022-10-05 RX ORDER — ESCITALOPRAM OXALATE 10 MG/1
10 TABLET ORAL DAILY
Qty: 30 TABLET | Refills: 0 | Status: SHIPPED | OUTPATIENT
Start: 2022-10-05 | End: 2022-12-07 | Stop reason: SDUPTHER

## 2022-10-06 NOTE — PROGRESS NOTES
"   TU Sanchez   [facility] [address] [place] [address]  [phone]      PATIENT NAME: Judy Miller  : 1950  DATE: 10/5/22  MRN: [id number]      Billing Provider: TU Sanchez  Level of Service:   Patient PCP Information       Provider PCP Type    TU Sanchez General            Reason for Visit / Chief Complaint: Anxiety and Shortness of Breath       Update PCP  Update Chief Complaint         History of Present Illness / Problem Focused Workflow     Judy Miller presents to the clinic with Anxiety and Shortness of Breath     Pt presents for evaluation of "anxiety". Pt reports she lost her  approx 4 months ago. Since that time her family has been trying to get her to move to be closer to them. She states sometimes she just gets "real nervous feeling" and a little short of breath. She states she has to sit down and take deep breaths until it passes. She reports she was grocery shopping today and had an episode. Discussed with pt this is likely anxiety/depression related to the recent loss of her  and all of the changes that go along with that.     She denies any exertional cp, pressure or discomfort. No MEYERS or orthopnea.     Had long discussion with pt about various medications. Will start her on low dose Lexapro and follow up.       Review of Systems     Review of Systems   Constitutional:  Negative for fatigue and fever.   HENT:  Negative for nasal congestion and sore throat.    Eyes:  Negative for visual disturbance.   Respiratory:  Negative for chest tightness and shortness of breath.    Cardiovascular:  Negative for chest pain and leg swelling.   Gastrointestinal:  Negative for abdominal pain, change in bowel habit and change in bowel habit.   Endocrine: Negative for polydipsia, polyphagia and polyuria.   Genitourinary:  Negative for dysuria and hematuria. "   Musculoskeletal:  Negative for back pain and leg pain.   Integumentary:  Negative for rash.   Neurological:  Negative for dizziness, syncope, weakness and light-headedness.   Psychiatric/Behavioral:  The patient is nervous/anxious.       Medical / Social / Family History     Past Medical History:   Diagnosis Date    Diabetes mellitus, type 2     GERD (gastroesophageal reflux disease)     Hyperlipidemia     Hypertension        Past Surgical History:   Procedure Laterality Date    HIP REPLACEMENT ARTHROPLASTY Left     TUBAL LIGATION         Social History  Ms. Miller  reports that she has never smoked. She has never used smokeless tobacco. She reports that she does not currently use alcohol. She reports that she does not use drugs.    Family History  Ms. Miller's family history includes Diabetes in her mother and sister; Heart disease in her mother; Hypertension in her mother.    Medications and Allergies     Medications  Outpatient Medications Marked as Taking for the 10/5/22 encounter (Office Visit) with TU Sanchez   Medication Sig Dispense Refill    aspirin (ECOTRIN) 81 MG EC tablet Take 81 mg by mouth once daily.      clobetasoL (TEMOVATE) 0.05 % cream Apply topically 2 (two) times daily. Apply a thin layer to the affected area(s)      fluticasone propionate (FLONASE) 50 mcg/actuation nasal spray 2 sprays (100 mcg total) by Each Nostril route once daily. 16 g 2    glipiZIDE (GLUCOTROL) 2.5 MG TR24 Take 2 tablets (5 mg total) by mouth daily with breakfast. 90 tablet 1    hydroCHLOROthiazide (HYDRODIURIL) 25 MG tablet Take 1 tablet (25 mg total) by mouth once daily. 90 tablet 1    ibuprofen (ADVIL,MOTRIN) 800 MG tablet Take 800 mg by mouth 3 (three) times daily as needed for Pain (Do not take with Naproxen).      lisinopriL 10 MG tablet Take 1 tablet (10 mg total) by mouth once daily. 90 tablet 1    loratadine (CLARITIN) 10 mg tablet Take 1 tablet (10 mg total) by mouth once daily. 90 tablet 1     pantoprazole (PROTONIX) 40 MG tablet Take 1 tablet (40 mg total) by mouth once daily. 90 tablet 1    polyethylene glycol (GLYCOLAX) 17 gram PwPk Take 17 g by mouth once daily. Mixed with 8 oz water, juice, soda, coffee or tea      potassium chloride SA (K-DUR,KLOR-CON M) 10 MEQ tablet Take 1 tablet (10 mEq total) by mouth once daily. With food 90 tablet 1    pravastatin (PRAVACHOL) 20 MG tablet Take 1 tablet (20 mg total) by mouth every evening. 90 tablet 1    verapamiL (CALAN-SR) 240 MG CR tablet Take 1 tablet (240 mg total) by mouth once daily. With food 90 tablet 1       Allergies  Review of patient's allergies indicates:   Allergen Reactions    Pneumococcal vaccine Swelling       Physical Examination     Vitals:    10/05/22 1407   BP: (!) 146/97   Pulse: 84   Resp: 18   Temp: 98.1 °F (36.7 °C)     Physical Exam  Eyes:      Pupils: Pupils are equal, round, and reactive to light.   Cardiovascular:      Rate and Rhythm: Normal rate and regular rhythm.      Heart sounds: Normal heart sounds. No murmur heard.  Pulmonary:      Breath sounds: Normal breath sounds. No wheezing, rhonchi or rales.   Abdominal:      General: Bowel sounds are normal.   Musculoskeletal:         General: No swelling.      Cervical back: Normal range of motion and neck supple.   Skin:     General: Skin is warm and dry.   Neurological:      Mental Status: She is alert and oriented to person, place, and time.   Psychiatric:         Mood and Affect: Mood normal.         Behavior: Behavior normal.        Assessment and Plan (including Health Maintenance)      Problem List  Smart Sets  Document Outside HM   :    Plan:   Lexapro daily      Health Maintenance Due   Topic Date Due    Eye Exam  Never done    DEXA Scan  Never done       Problem List Items Addressed This Visit    None  Visit Diagnoses       Anxiety and depression    -  Primary    start on low dose lexapro     Influenza vaccination administered at current visit        Relevant Orders     Influenza - Quadrivalent (Adjuvanted) (Completed)            Health Maintenance Topics with due status: Not Due       Topic Last Completion Date    TETANUS VACCINE 01/04/2013    Colorectal Cancer Screening 01/09/2017    Diabetes Urine Screening 08/08/2022    Lipid Panel 08/08/2022    Hemoglobin A1c 08/08/2022    Mammogram 08/15/2022    Low Dose Statin 10/05/2022       Future Appointments   Date Time Provider Department Center   11/28/2022  8:20 AM TU Sanchez [facility] [place]   8/21/2023 10:15 AM [facility]   9/6/2023  8:00 AM AWV NURSE, [facility] [place]            Signature:  TU [facility] [address] [address]  [phone]    Date of encounter: 10/5/22

## 2022-10-14 ENCOUNTER — OFFICE VISIT (OUTPATIENT)
Dept: FAMILY MEDICINE | Facility: CLINIC | Age: 72
End: 2022-10-14
Payer: MEDICARE

## 2022-10-14 VITALS
HEART RATE: 96 BPM | TEMPERATURE: 98 F | WEIGHT: 212.63 LBS | BODY MASS INDEX: 34.17 KG/M2 | RESPIRATION RATE: 18 BRPM | OXYGEN SATURATION: 95 % | SYSTOLIC BLOOD PRESSURE: 140 MMHG | HEIGHT: 66 IN | DIASTOLIC BLOOD PRESSURE: 82 MMHG

## 2022-10-14 DIAGNOSIS — K30 UPSET STOMACH: ICD-10-CM

## 2022-10-14 DIAGNOSIS — F32.A DEPRESSION, UNSPECIFIED DEPRESSION TYPE: Primary | ICD-10-CM

## 2022-10-14 DIAGNOSIS — R11.0 NAUSEA: ICD-10-CM

## 2022-10-14 DIAGNOSIS — M85.89 OTHER SPECIFIED DISORDERS OF BONE DENSITY AND STRUCTURE, MULTIPLE SITES: ICD-10-CM

## 2022-10-14 PROCEDURE — 3044F HG A1C LEVEL LT 7.0%: CPT | Mod: ,,, | Performed by: NURSE PRACTITIONER

## 2022-10-14 PROCEDURE — 3077F SYST BP >= 140 MM HG: CPT | Mod: ,,, | Performed by: NURSE PRACTITIONER

## 2022-10-14 PROCEDURE — 4010F PR ACE/ARB THEARPY RXD/TAKEN: ICD-10-PCS | Mod: ,,, | Performed by: NURSE PRACTITIONER

## 2022-10-14 PROCEDURE — 3061F PR NEG MICROALBUMINURIA RESULT DOCUMENTED/REVIEW: ICD-10-PCS | Mod: ,,, | Performed by: NURSE PRACTITIONER

## 2022-10-14 PROCEDURE — 3079F PR MOST RECENT DIASTOLIC BLOOD PRESSURE 80-89 MM HG: ICD-10-PCS | Mod: ,,, | Performed by: NURSE PRACTITIONER

## 2022-10-14 PROCEDURE — 3066F PR DOCUMENTATION OF TREATMENT FOR NEPHROPATHY: ICD-10-PCS | Mod: ,,, | Performed by: NURSE PRACTITIONER

## 2022-10-14 PROCEDURE — 3077F PR MOST RECENT SYSTOLIC BLOOD PRESSURE >= 140 MM HG: ICD-10-PCS | Mod: ,,, | Performed by: NURSE PRACTITIONER

## 2022-10-14 PROCEDURE — 99213 PR OFFICE/OUTPT VISIT, EST, LEVL III, 20-29 MIN: ICD-10-PCS | Mod: ,,, | Performed by: NURSE PRACTITIONER

## 2022-10-14 PROCEDURE — 3079F DIAST BP 80-89 MM HG: CPT | Mod: ,,, | Performed by: NURSE PRACTITIONER

## 2022-10-14 PROCEDURE — 3061F NEG MICROALBUMINURIA REV: CPT | Mod: ,,, | Performed by: NURSE PRACTITIONER

## 2022-10-14 PROCEDURE — 3044F PR MOST RECENT HEMOGLOBIN A1C LEVEL <7.0%: ICD-10-PCS | Mod: ,,, | Performed by: NURSE PRACTITIONER

## 2022-10-14 PROCEDURE — 3066F NEPHROPATHY DOC TX: CPT | Mod: ,,, | Performed by: NURSE PRACTITIONER

## 2022-10-14 PROCEDURE — 99213 OFFICE O/P EST LOW 20 MIN: CPT | Mod: ,,, | Performed by: NURSE PRACTITIONER

## 2022-10-14 PROCEDURE — 4010F ACE/ARB THERAPY RXD/TAKEN: CPT | Mod: ,,, | Performed by: NURSE PRACTITIONER

## 2022-10-14 RX ORDER — METFORMIN HYDROCHLORIDE 500 MG/1
500 TABLET ORAL 2 TIMES DAILY WITH MEALS
COMMUNITY
End: 2022-10-14

## 2022-10-14 RX ORDER — DICYCLOMINE HYDROCHLORIDE 20 MG/1
20 TABLET ORAL EVERY 6 HOURS
Qty: 30 TABLET | Refills: 0 | Status: SHIPPED | OUTPATIENT
Start: 2022-10-14 | End: 2022-11-13

## 2022-10-14 RX ORDER — ONDANSETRON 4 MG/1
4 TABLET, ORALLY DISINTEGRATING ORAL EVERY 8 HOURS PRN
Qty: 10 TABLET | Refills: 0 | Status: SHIPPED | OUTPATIENT
Start: 2022-10-14 | End: 2024-03-26 | Stop reason: SDUPTHER

## 2022-10-14 NOTE — PROGRESS NOTES
Vera Mendoza DNP   [address] [place] [address]     PATIENT NAME: Judy Miller  : 1950  DATE: 10/14/22  MRN: [id number]      Billing Provider: Vera Mendoza DNP  Level of Service:   Patient PCP Information       Provider PCP Type    TU Sanchez General            Reason for Visit / Chief Complaint: Nausea, Abdominal Pain, Anxiety, and Depression       Update PCP  Update Chief Complaint         History of Present Illness / Problem Focused Workflow     Judy Miller presents to the clinic with Nausea, Abdominal Pain, Anxiety, and Depression     Nausea  Associated symptoms include abdominal pain and nausea.   Abdominal Pain  Associated symptoms include nausea.   Anxiety  Symptoms include nausea.       Depression    Review of Systems     Review of Systems   Gastrointestinal:  Positive for abdominal pain and nausea.   Psychiatric/Behavioral:  Positive for depression.       Medical / Social / Family History     Past Medical History:   Diagnosis Date    Diabetes mellitus, type 2     GERD (gastroesophageal reflux disease)     Hyperlipidemia     Hypertension        Past Surgical History:   Procedure Laterality Date    HIP REPLACEMENT ARTHROPLASTY Left     TUBAL LIGATION         Social History  Ms.  reports that she has never smoked. She has never used smokeless tobacco. She reports that she does not currently use alcohol. She reports that she does not use drugs.    Family History  Ms.'s family history includes Diabetes in her mother and sister; Heart disease in her mother; Hypertension in her mother.    Medications and Allergies     Medications  Outpatient Medications Marked as Taking for the 10/14/22 encounter (Office Visit) with Vera Mendoza DNP   Medication Sig Dispense Refill    aspirin (ECOTRIN) 81 MG EC tablet Take 81 mg by mouth once daily.      clobetasoL (TEMOVATE) 0.05 % cream Apply topically 2 (two) times daily. Apply a thin layer to the affected area(s)       "EScitalopram oxalate (LEXAPRO) 10 MG tablet Take 1 tablet (10 mg total) by mouth once daily. 30 tablet 0    fluticasone propionate (FLONASE) 50 mcg/actuation nasal spray 2 sprays (100 mcg total) by Each Nostril route once daily. 16 g 2    hydroCHLOROthiazide (HYDRODIURIL) 25 MG tablet Take 1 tablet (25 mg total) by mouth once daily. 90 tablet 1    lisinopriL 10 MG tablet Take 1 tablet (10 mg total) by mouth once daily. 90 tablet 1    loratadine (CLARITIN) 10 mg tablet Take 1 tablet (10 mg total) by mouth once daily. 90 tablet 1    pantoprazole (PROTONIX) 40 MG tablet Take 1 tablet (40 mg total) by mouth once daily. 90 tablet 1    polyethylene glycol (GLYCOLAX) 17 gram PwPk Take 17 g by mouth once daily. Mixed with 8 oz water, juice, soda, coffee or tea      potassium chloride SA (K-DUR,KLOR-CON M) 10 MEQ tablet Take 1 tablet (10 mEq total) by mouth once daily. With food 90 tablet 1    pravastatin (PRAVACHOL) 20 MG tablet Take 1 tablet (20 mg total) by mouth every evening. 90 tablet 1    verapamiL (CALAN-SR) 240 MG CR tablet Take 1 tablet (240 mg total) by mouth once daily. With food 90 tablet 1    [DISCONTINUED] ibuprofen (ADVIL,MOTRIN) 800 MG tablet Take 800 mg by mouth 3 (three) times daily as needed for Pain (Do not take with Naproxen).      [DISCONTINUED] metFORMIN (GLUCOPHAGE) 500 MG tablet Take 500 mg by mouth 2 (two) times daily with meals.         Allergies  Review of patient's allergies indicates:   Allergen Reactions    Pneumococcal vaccine Swelling       Physical Examination   BP (!) 140/82 (BP Location: Left arm, Patient Position: Sitting, BP Method: Large (Automatic))   Pulse 96   Temp 98.3 °F (36.8 °C) (Oral)   Resp 18   Ht 5' 6" (1.676 m)   Wt 96.4 kg (212 lb 9.6 oz)   SpO2 95%   BMI 34.31 kg/m²    Physical Exam  Constitutional:       General: She is not in acute distress.     Appearance: Normal appearance.   HENT:      Head: Normocephalic.      Right Ear: Tympanic membrane normal.      Left " Ear: Tympanic membrane normal.      Nose: No congestion or rhinorrhea.   Eyes:      Pupils: Pupils are equal, round, and reactive to light.   Cardiovascular:      Rate and Rhythm: Normal rate and regular rhythm.      Pulses: Normal pulses.      Heart sounds: Normal heart sounds. No murmur heard.  Pulmonary:      Effort: Pulmonary effort is normal.      Breath sounds: Normal breath sounds.   Chest:      Chest wall: Tenderness present.   Abdominal:      General: Bowel sounds are normal. There is no distension.      Hernia: No hernia is present.   Musculoskeletal:         General: No swelling or tenderness.      Right lower leg: No edema.      Left lower leg: No edema.   Skin:     General: Skin is warm and dry.   Neurological:      General: No focal deficit present.      Mental Status: She is alert and oriented to person, place, and time.        Assessment and Plan (including Health Maintenance)      Problem List  Smart Sets  Document Outside HM   :    Plan:     Needs dexa.     Decrease lexapro to 5mg at bedtime.   Fu with aria figueroa in 1-2 weeks.    Health Maintenance Due   Topic Date Due    Eye Exam  Never done    DEXA Scan  Never done       Problem List Items Addressed This Visit          Psychiatric    Depression - Primary       GI    Upset stomach    Nausea       Orthopedic    Other specified disorders of bone density and structure, multiple sites    Relevant Orders    DXA Bone Density Spine And Hip       Health Maintenance Topics with due status: Not Due       Topic Last Completion Date    TETANUS VACCINE 01/04/2013    Colorectal Cancer Screening 01/09/2017    Diabetes Urine Screening 08/08/2022    Lipid Panel 08/08/2022    Hemoglobin A1c 08/08/2022    Mammogram 08/15/2022    Low Dose Statin 10/06/2022       Future Appointments   Date Time Provider Department Center   11/3/2022  9:00 AM [facility]   11/28/2022  8:20 AM TU Sanchez [facility] [place]   8/21/2023 10:15  AM [facility]   9/6/2023  8:00 AM AWV NURSE, [facility] [place]            Signature:  Vera Mendoza, DNP      [address] [place] [address]    Date of encounter: 10/14/22

## 2022-10-20 ENCOUNTER — TELEPHONE (OUTPATIENT)
Dept: FAMILY MEDICINE | Facility: CLINIC | Age: 72
End: 2022-10-20
Payer: MEDICARE

## 2022-10-20 NOTE — TELEPHONE ENCOUNTER
----- Message from Zekeiah Ormond sent at 10/20/2022  3:43 PM CDT -----  Patient stated that the anxiety medication is doing good but she is only taking a half a pill. She also said she is not having an appetite and asked If something could be called in for that.

## 2022-11-09 DIAGNOSIS — Z71.89 COMPLEX CARE COORDINATION: ICD-10-CM

## 2022-12-07 RX ORDER — ESCITALOPRAM OXALATE 10 MG/1
10 TABLET ORAL DAILY
Qty: 30 TABLET | Refills: 0 | Status: SHIPPED | OUTPATIENT
Start: 2022-12-07 | End: 2022-12-19 | Stop reason: SDUPTHER

## 2022-12-07 NOTE — TELEPHONE ENCOUNTER
----- Message from Zekeiah Ormond sent at 12/7/2022  9:43 AM CST -----  Patient asked for a refill on for her Anxiety medication. She wasn't sure of the name of the medication. Her call back number is [phone]

## 2022-12-19 ENCOUNTER — OFFICE VISIT (OUTPATIENT)
Dept: FAMILY MEDICINE | Facility: CLINIC | Age: 72
End: 2022-12-19
Payer: MEDICARE

## 2022-12-19 ENCOUNTER — TELEPHONE (OUTPATIENT)
Dept: FAMILY MEDICINE | Facility: CLINIC | Age: 72
End: 2022-12-19
Payer: MEDICARE

## 2022-12-19 VITALS
OXYGEN SATURATION: 97 % | WEIGHT: 218 LBS | HEIGHT: 66 IN | HEART RATE: 81 BPM | RESPIRATION RATE: 16 BRPM | TEMPERATURE: 99 F | SYSTOLIC BLOOD PRESSURE: 125 MMHG | BODY MASS INDEX: 35.03 KG/M2 | DIASTOLIC BLOOD PRESSURE: 73 MMHG

## 2022-12-19 DIAGNOSIS — I10 HYPERTENSION, UNSPECIFIED TYPE: Primary | ICD-10-CM

## 2022-12-19 DIAGNOSIS — E78.5 HYPERLIPIDEMIA, UNSPECIFIED HYPERLIPIDEMIA TYPE: ICD-10-CM

## 2022-12-19 DIAGNOSIS — E11.9 TYPE 2 DIABETES MELLITUS WITHOUT COMPLICATION, WITHOUT LONG-TERM CURRENT USE OF INSULIN: ICD-10-CM

## 2022-12-19 LAB
ALBUMIN SERPL BCP-MCNC: 4.1 G/DL (ref 3.5–5)
ALBUMIN/GLOB SERPL: 1.1 {RATIO}
ALP SERPL-CCNC: 60 U/L (ref 55–142)
ALT SERPL W P-5'-P-CCNC: 17 U/L (ref 13–56)
ANION GAP SERPL CALCULATED.3IONS-SCNC: 14 MMOL/L (ref 7–16)
AST SERPL W P-5'-P-CCNC: 15 U/L (ref 15–37)
BILIRUB SERPL-MCNC: 0.7 MG/DL (ref ?–1.2)
BUN SERPL-MCNC: 17 MG/DL (ref 7–18)
BUN/CREAT SERPL: 14 (ref 6–20)
CALCIUM SERPL-MCNC: 9 MG/DL (ref 8.5–10.1)
CHLORIDE SERPL-SCNC: 101 MMOL/L (ref 98–107)
CHOLEST SERPL-MCNC: 175 MG/DL (ref 0–200)
CHOLEST/HDLC SERPL: 2.3 {RATIO}
CO2 SERPL-SCNC: 30 MMOL/L (ref 21–32)
CREAT SERPL-MCNC: 1.21 MG/DL (ref 0.55–1.02)
EGFR (NO RACE VARIABLE) (RUSH/TITUS): 48 ML/MIN/1.73M²
EST. AVERAGE GLUCOSE BLD GHB EST-MCNC: 77 MG/DL
GLOBULIN SER-MCNC: 3.8 G/DL (ref 2–4)
GLUCOSE SERPL-MCNC: 111 MG/DL (ref 74–106)
HBA1C MFR BLD HPLC: 4.9 % (ref 4.5–6.6)
HDLC SERPL-MCNC: 75 MG/DL (ref 40–60)
LDLC SERPL CALC-MCNC: 82 MG/DL
LDLC/HDLC SERPL: 1.1 {RATIO}
NONHDLC SERPL-MCNC: 100 MG/DL
POTASSIUM SERPL-SCNC: 4 MMOL/L (ref 3.5–5.1)
PROT SERPL-MCNC: 7.9 G/DL (ref 6.4–8.2)
SODIUM SERPL-SCNC: 141 MMOL/L (ref 136–145)
TRIGL SERPL-MCNC: 91 MG/DL (ref 35–150)
VLDLC SERPL-MCNC: 18 MG/DL

## 2022-12-19 PROCEDURE — 3074F SYST BP LT 130 MM HG: CPT | Mod: ,,, | Performed by: NURSE PRACTITIONER

## 2022-12-19 PROCEDURE — 3008F BODY MASS INDEX DOCD: CPT | Mod: ,,, | Performed by: NURSE PRACTITIONER

## 2022-12-19 PROCEDURE — 83036 HEMOGLOBIN GLYCOSYLATED A1C: CPT | Mod: ,,, | Performed by: CLINICAL MEDICAL LABORATORY

## 2022-12-19 PROCEDURE — 80061 LIPID PANEL: ICD-10-PCS | Mod: ,,, | Performed by: CLINICAL MEDICAL LABORATORY

## 2022-12-19 PROCEDURE — 83036 HEMOGLOBIN A1C: ICD-10-PCS | Mod: ,,, | Performed by: CLINICAL MEDICAL LABORATORY

## 2022-12-19 PROCEDURE — 3078F DIAST BP <80 MM HG: CPT | Mod: ,,, | Performed by: NURSE PRACTITIONER

## 2022-12-19 PROCEDURE — 3044F HG A1C LEVEL LT 7.0%: CPT | Mod: ,,, | Performed by: NURSE PRACTITIONER

## 2022-12-19 PROCEDURE — 80061 LIPID PANEL: CPT | Mod: ,,, | Performed by: CLINICAL MEDICAL LABORATORY

## 2022-12-19 PROCEDURE — 3066F NEPHROPATHY DOC TX: CPT | Mod: ,,, | Performed by: NURSE PRACTITIONER

## 2022-12-19 PROCEDURE — 1159F MED LIST DOCD IN RCRD: CPT | Mod: ,,, | Performed by: NURSE PRACTITIONER

## 2022-12-19 PROCEDURE — 99214 OFFICE O/P EST MOD 30 MIN: CPT | Mod: ,,, | Performed by: NURSE PRACTITIONER

## 2022-12-19 PROCEDURE — 3061F NEG MICROALBUMINURIA REV: CPT | Mod: ,,, | Performed by: NURSE PRACTITIONER

## 2022-12-19 PROCEDURE — 3061F PR NEG MICROALBUMINURIA RESULT DOCUMENTED/REVIEW: ICD-10-PCS | Mod: ,,, | Performed by: NURSE PRACTITIONER

## 2022-12-19 PROCEDURE — 80053 COMPREHENSIVE METABOLIC PANEL: ICD-10-PCS | Mod: ,,, | Performed by: CLINICAL MEDICAL LABORATORY

## 2022-12-19 PROCEDURE — 3078F PR MOST RECENT DIASTOLIC BLOOD PRESSURE < 80 MM HG: ICD-10-PCS | Mod: ,,, | Performed by: NURSE PRACTITIONER

## 2022-12-19 PROCEDURE — 80053 COMPREHEN METABOLIC PANEL: CPT | Mod: ,,, | Performed by: CLINICAL MEDICAL LABORATORY

## 2022-12-19 PROCEDURE — 3074F PR MOST RECENT SYSTOLIC BLOOD PRESSURE < 130 MM HG: ICD-10-PCS | Mod: ,,, | Performed by: NURSE PRACTITIONER

## 2022-12-19 PROCEDURE — 3008F PR BODY MASS INDEX (BMI) DOCUMENTED: ICD-10-PCS | Mod: ,,, | Performed by: NURSE PRACTITIONER

## 2022-12-19 PROCEDURE — 1159F PR MEDICATION LIST DOCUMENTED IN MEDICAL RECORD: ICD-10-PCS | Mod: ,,, | Performed by: NURSE PRACTITIONER

## 2022-12-19 PROCEDURE — 3044F PR MOST RECENT HEMOGLOBIN A1C LEVEL <7.0%: ICD-10-PCS | Mod: ,,, | Performed by: NURSE PRACTITIONER

## 2022-12-19 PROCEDURE — 99214 PR OFFICE/OUTPT VISIT, EST, LEVL IV, 30-39 MIN: ICD-10-PCS | Mod: ,,, | Performed by: NURSE PRACTITIONER

## 2022-12-19 PROCEDURE — 4010F ACE/ARB THERAPY RXD/TAKEN: CPT | Mod: ,,, | Performed by: NURSE PRACTITIONER

## 2022-12-19 PROCEDURE — 4010F PR ACE/ARB THEARPY RXD/TAKEN: ICD-10-PCS | Mod: ,,, | Performed by: NURSE PRACTITIONER

## 2022-12-19 PROCEDURE — 3066F PR DOCUMENTATION OF TREATMENT FOR NEPHROPATHY: ICD-10-PCS | Mod: ,,, | Performed by: NURSE PRACTITIONER

## 2022-12-19 RX ORDER — ESCITALOPRAM OXALATE 10 MG/1
10 TABLET ORAL DAILY
Qty: 90 TABLET | Refills: 1 | Status: SHIPPED | OUTPATIENT
Start: 2022-12-19 | End: 2023-07-26 | Stop reason: SDUPTHER

## 2022-12-19 RX ORDER — IBUPROFEN 600 MG/1
600 TABLET ORAL EVERY 6 HOURS PRN
Qty: 60 TABLET | Refills: 1 | Status: SHIPPED | OUTPATIENT
Start: 2022-12-19 | End: 2023-03-27 | Stop reason: DRUGHIGH

## 2022-12-19 NOTE — TELEPHONE ENCOUNTER
----- Message from Zekeiah Ormond sent at 12/19/2022  1:01 PM CST -----  Patient asked for a call back. Her call back number is [phone].

## 2022-12-19 NOTE — PROGRESS NOTES
TU Sanchez   [facility] [address] [place] [address]  [phone]      PATIENT NAME: Judy Miller  : 1950  DATE: 22  MRN: [id number]      Billing Provider: TU Sanchez  Level of Service:   Patient PCP Information       Provider PCP Type    TU Sanchez General            Reason for Visit / Chief Complaint: Hypertension, Hyperlipidemia, Diabetes, and Follow-up (3 mos)       Update PCP  Update Chief Complaint         History of Present Illness / Problem Focused Workflow     Judy Miller presents to the clinic with Hypertension, Hyperlipidemia, Diabetes, and Follow-up (3 mos)     Routine follow up with lab and med refills. Overall doing well.    Bp well controlled. Cont home meds   Advised to monitor BP at home. Advised on optimal BP readings - SBP < 130 & DBP < 80. Advised to call office for any persistent BP elevation and may have to prescribe or adjust BP med(s).  Recommended DASH diet, stay well hydrated with water daily, eliminate or decrease caffeinated and high calorie drinks, increase physical activity, and lose weight if BMI > 25.0.    Diabetes treatment goals: (unless otherwise indicated due to risk factor stratification)  To achieve normal or near normal glucose levels.  Fasting glucose:   Before meals: 100-140  2 hours after meal:less 150  Bedtime goal glucose > 100          Review of Systems     Review of Systems   Constitutional:  Negative for fatigue and fever.   HENT:  Negative for nasal congestion and sore throat.    Eyes:  Negative for visual disturbance.   Respiratory:  Negative for chest tightness and shortness of breath.    Cardiovascular:  Negative for chest pain and leg swelling.   Gastrointestinal:  Negative for abdominal pain, change in bowel habit and change in bowel habit.   Endocrine: Negative for polydipsia, polyphagia and polyuria.    Genitourinary:  Negative for dysuria and hematuria.   Musculoskeletal:  Negative for back pain and leg pain.   Integumentary:  Negative for rash.   Neurological:  Negative for dizziness, syncope, weakness and light-headedness.      Medical / Social / Family History     Past Medical History:   Diagnosis Date    Diabetes mellitus, type 2     GERD (gastroesophageal reflux disease)     Hyperlipidemia     Hypertension        Past Surgical History:   Procedure Laterality Date    HIP REPLACEMENT ARTHROPLASTY Left     TUBAL LIGATION         Social History  Ms. Miller  reports that she has never smoked. She has never used smokeless tobacco. She reports that she does not currently use alcohol. She reports that she does not use drugs.    Family History  Ms. Miller's family history includes Diabetes in her mother and sister; Heart disease in her mother; Hypertension in her mother.    Medications and Allergies     Medications  Outpatient Medications Marked as Taking for the 12/19/22 encounter (Office Visit) with TU Sanchez   Medication Sig Dispense Refill    aspirin (ECOTRIN) 81 MG EC tablet Take 81 mg by mouth once daily.      clobetasoL (TEMOVATE) 0.05 % cream Apply topically 2 (two) times daily. Apply a thin layer to the affected area(s)      fluticasone propionate (FLONASE) 50 mcg/actuation nasal spray 2 sprays (100 mcg total) by Each Nostril route once daily. 16 g 2    glipiZIDE (GLUCOTROL) 2.5 MG TR24 Take 2 tablets (5 mg total) by mouth daily with breakfast. 90 tablet 1    hydroCHLOROthiazide (HYDRODIURIL) 25 MG tablet Take 1 tablet (25 mg total) by mouth once daily. 90 tablet 1    lisinopriL 10 MG tablet Take 1 tablet (10 mg total) by mouth once daily. 90 tablet 1    loratadine (CLARITIN) 10 mg tablet Take 1 tablet (10 mg total) by mouth once daily. 90 tablet 1    ondansetron (ZOFRAN-ODT) 4 MG TbDL Take 1 tablet (4 mg total) by mouth every 8 (eight) hours as needed (for nausea/vomiting). 10 tablet 0     pantoprazole (PROTONIX) 40 MG tablet Take 1 tablet (40 mg total) by mouth once daily. 90 tablet 1    polyethylene glycol (GLYCOLAX) 17 gram PwPk Take 17 g by mouth once daily. Mixed with 8 oz water, juice, soda, coffee or tea      potassium chloride SA (K-DUR,KLOR-CON M) 10 MEQ tablet Take 1 tablet (10 mEq total) by mouth once daily. With food 90 tablet 1    pravastatin (PRAVACHOL) 20 MG tablet Take 1 tablet (20 mg total) by mouth every evening. 90 tablet 1    [DISCONTINUED] EScitalopram oxalate (LEXAPRO) 10 MG tablet Take 1 tablet (10 mg total) by mouth once daily. 30 tablet 0       Allergies  Review of patient's allergies indicates:   Allergen Reactions    Pneumococcal vaccine Swelling       Physical Examination     Vitals:    12/19/22 1113   BP: 125/73   Pulse: 81   Resp: 16   Temp: 98.5 °F (36.9 °C)     Physical Exam  Eyes:      Pupils: Pupils are equal, round, and reactive to light.   Cardiovascular:      Rate and Rhythm: Normal rate and regular rhythm.      Heart sounds: Normal heart sounds. No murmur heard.  Pulmonary:      Breath sounds: Normal breath sounds. No wheezing, rhonchi or rales.   Abdominal:      General: Bowel sounds are normal.   Musculoskeletal:         General: No swelling.      Cervical back: Normal range of motion and neck supple.   Skin:     General: Skin is warm and dry.   Neurological:      Mental Status: She is alert and oriented to person, place, and time.        Assessment and Plan (including Health Maintenance)      Problem List  Smart Sets  Document Outside HM   :    Plan:   Cont home meds      Health Maintenance Due   Topic Date Due    Foot Exam  Never done    Eye Exam  Never done    DEXA Scan  Never done       Problem List Items Addressed This Visit          Cardiac/Vascular    Hypertension - Primary    Relevant Orders    Comprehensive Metabolic Panel    Hyperlipidemia    Relevant Orders    Lipid Panel       Endocrine    Type 2 diabetes mellitus without complication, without  long-term current use of insulin    Relevant Orders    Hemoglobin A1C       Health Maintenance Topics with due status: Not Due       Topic Last Completion Date    TETANUS VACCINE 01/04/2013    Colorectal Cancer Screening 01/09/2017    Diabetes Urine Screening 08/08/2022    Lipid Panel 08/08/2022    Hemoglobin A1c 08/08/2022    Mammogram 08/15/2022    Low Dose Statin 12/19/2022       Future Appointments   Date Time Provider Department Center   3/27/2023 10:00 AM TU Sanchez [facility] [place]   [facility]   9/6/2023  8:00 AM AWV NURSE, [facility]ILY MEDIC[facility] [place]            Signature:  TU Sanchez [facility] [address] [address]  [phone]    Date of encounter: 12/19/22

## 2023-03-27 ENCOUNTER — OFFICE VISIT (OUTPATIENT)
Dept: FAMILY MEDICINE | Facility: CLINIC | Age: 73
End: 2023-03-27
Payer: MEDICARE

## 2023-03-27 VITALS
DIASTOLIC BLOOD PRESSURE: 89 MMHG | OXYGEN SATURATION: 99 % | HEART RATE: 93 BPM | WEIGHT: 222 LBS | HEIGHT: 66 IN | RESPIRATION RATE: 16 BRPM | SYSTOLIC BLOOD PRESSURE: 138 MMHG | TEMPERATURE: 98 F | BODY MASS INDEX: 35.68 KG/M2

## 2023-03-27 DIAGNOSIS — I10 HYPERTENSION, UNSPECIFIED TYPE: Primary | ICD-10-CM

## 2023-03-27 DIAGNOSIS — E78.5 HYPERLIPIDEMIA, UNSPECIFIED HYPERLIPIDEMIA TYPE: ICD-10-CM

## 2023-03-27 DIAGNOSIS — E11.9 TYPE 2 DIABETES MELLITUS WITHOUT COMPLICATION, WITHOUT LONG-TERM CURRENT USE OF INSULIN: ICD-10-CM

## 2023-03-27 LAB
ALBUMIN SERPL BCP-MCNC: 4 G/DL (ref 3.5–5)
ALBUMIN/GLOB SERPL: 1 {RATIO}
ALP SERPL-CCNC: 60 U/L (ref 55–142)
ALT SERPL W P-5'-P-CCNC: 21 U/L (ref 13–56)
ANION GAP SERPL CALCULATED.3IONS-SCNC: 10 MMOL/L (ref 7–16)
AST SERPL W P-5'-P-CCNC: 12 U/L (ref 15–37)
BILIRUB SERPL-MCNC: 0.6 MG/DL (ref ?–1.2)
BUN SERPL-MCNC: 17 MG/DL (ref 7–18)
BUN/CREAT SERPL: 17 (ref 6–20)
CALCIUM SERPL-MCNC: 9.3 MG/DL (ref 8.5–10.1)
CHLORIDE SERPL-SCNC: 101 MMOL/L (ref 98–107)
CHOLEST SERPL-MCNC: 177 MG/DL (ref 0–200)
CHOLEST/HDLC SERPL: 2.3 {RATIO}
CO2 SERPL-SCNC: 32 MMOL/L (ref 21–32)
CREAT SERPL-MCNC: 1 MG/DL (ref 0.55–1.02)
EGFR (NO RACE VARIABLE) (RUSH/TITUS): 60 ML/MIN/1.73M²
EST. AVERAGE GLUCOSE BLD GHB EST-MCNC: 81 MG/DL
GLOBULIN SER-MCNC: 4.2 G/DL (ref 2–4)
GLUCOSE SERPL-MCNC: 135 MG/DL (ref 74–106)
HBA1C MFR BLD HPLC: 5 % (ref 4.5–6.6)
HDLC SERPL-MCNC: 76 MG/DL (ref 40–60)
LDLC SERPL CALC-MCNC: 92 MG/DL
LDLC/HDLC SERPL: 1.2 {RATIO}
NONHDLC SERPL-MCNC: 101 MG/DL
POTASSIUM SERPL-SCNC: 3.4 MMOL/L (ref 3.5–5.1)
PROT SERPL-MCNC: 8.2 G/DL (ref 6.4–8.2)
SODIUM SERPL-SCNC: 140 MMOL/L (ref 136–145)
TRIGL SERPL-MCNC: 44 MG/DL (ref 35–150)
VLDLC SERPL-MCNC: 9 MG/DL

## 2023-03-27 PROCEDURE — 3079F DIAST BP 80-89 MM HG: CPT | Mod: ,,, | Performed by: NURSE PRACTITIONER

## 2023-03-27 PROCEDURE — 83036 HEMOGLOBIN GLYCOSYLATED A1C: CPT | Mod: ,,, | Performed by: CLINICAL MEDICAL LABORATORY

## 2023-03-27 PROCEDURE — 3075F SYST BP GE 130 - 139MM HG: CPT | Mod: ,,, | Performed by: NURSE PRACTITIONER

## 2023-03-27 PROCEDURE — 3075F PR MOST RECENT SYSTOLIC BLOOD PRESS GE 130-139MM HG: ICD-10-PCS | Mod: ,,, | Performed by: NURSE PRACTITIONER

## 2023-03-27 PROCEDURE — 1159F PR MEDICATION LIST DOCUMENTED IN MEDICAL RECORD: ICD-10-PCS | Mod: ,,, | Performed by: NURSE PRACTITIONER

## 2023-03-27 PROCEDURE — 4010F PR ACE/ARB THEARPY RXD/TAKEN: ICD-10-PCS | Mod: ,,, | Performed by: NURSE PRACTITIONER

## 2023-03-27 PROCEDURE — 1159F MED LIST DOCD IN RCRD: CPT | Mod: ,,, | Performed by: NURSE PRACTITIONER

## 2023-03-27 PROCEDURE — 3079F PR MOST RECENT DIASTOLIC BLOOD PRESSURE 80-89 MM HG: ICD-10-PCS | Mod: ,,, | Performed by: NURSE PRACTITIONER

## 2023-03-27 PROCEDURE — 1101F PT FALLS ASSESS-DOCD LE1/YR: CPT | Mod: ,,, | Performed by: NURSE PRACTITIONER

## 2023-03-27 PROCEDURE — 80053 COMPREHENSIVE METABOLIC PANEL: ICD-10-PCS | Mod: ,,, | Performed by: CLINICAL MEDICAL LABORATORY

## 2023-03-27 PROCEDURE — 80053 COMPREHEN METABOLIC PANEL: CPT | Mod: ,,, | Performed by: CLINICAL MEDICAL LABORATORY

## 2023-03-27 PROCEDURE — 3008F BODY MASS INDEX DOCD: CPT | Mod: ,,, | Performed by: NURSE PRACTITIONER

## 2023-03-27 PROCEDURE — 4010F ACE/ARB THERAPY RXD/TAKEN: CPT | Mod: ,,, | Performed by: NURSE PRACTITIONER

## 2023-03-27 PROCEDURE — 1101F PR PT FALLS ASSESS DOC 0-1 FALLS W/OUT INJ PAST YR: ICD-10-PCS | Mod: ,,, | Performed by: NURSE PRACTITIONER

## 2023-03-27 PROCEDURE — 1160F PR REVIEW ALL MEDS BY PRESCRIBER/CLIN PHARMACIST DOCUMENTED: ICD-10-PCS | Mod: ,,, | Performed by: NURSE PRACTITIONER

## 2023-03-27 PROCEDURE — 99213 PR OFFICE/OUTPT VISIT, EST, LEVL III, 20-29 MIN: ICD-10-PCS | Mod: ,,, | Performed by: NURSE PRACTITIONER

## 2023-03-27 PROCEDURE — 3288F PR FALLS RISK ASSESSMENT DOCUMENTED: ICD-10-PCS | Mod: ,,, | Performed by: NURSE PRACTITIONER

## 2023-03-27 PROCEDURE — 99213 OFFICE O/P EST LOW 20 MIN: CPT | Mod: ,,, | Performed by: NURSE PRACTITIONER

## 2023-03-27 PROCEDURE — 83036 HEMOGLOBIN A1C: ICD-10-PCS | Mod: ,,, | Performed by: CLINICAL MEDICAL LABORATORY

## 2023-03-27 PROCEDURE — 80061 LIPID PANEL: CPT | Mod: ,,, | Performed by: CLINICAL MEDICAL LABORATORY

## 2023-03-27 PROCEDURE — 1160F RVW MEDS BY RX/DR IN RCRD: CPT | Mod: ,,, | Performed by: NURSE PRACTITIONER

## 2023-03-27 PROCEDURE — 80061 LIPID PANEL: ICD-10-PCS | Mod: ,,, | Performed by: CLINICAL MEDICAL LABORATORY

## 2023-03-27 PROCEDURE — 3008F PR BODY MASS INDEX (BMI) DOCUMENTED: ICD-10-PCS | Mod: ,,, | Performed by: NURSE PRACTITIONER

## 2023-03-27 PROCEDURE — 3288F FALL RISK ASSESSMENT DOCD: CPT | Mod: ,,, | Performed by: NURSE PRACTITIONER

## 2023-03-27 RX ORDER — HYDROCHLOROTHIAZIDE 25 MG/1
25 TABLET ORAL DAILY
Qty: 90 TABLET | Refills: 1 | Status: SHIPPED | OUTPATIENT
Start: 2023-03-27 | End: 2023-07-26 | Stop reason: SDUPTHER

## 2023-03-27 RX ORDER — PRAVASTATIN SODIUM 20 MG/1
20 TABLET ORAL NIGHTLY
Qty: 90 TABLET | Refills: 1 | Status: SHIPPED | OUTPATIENT
Start: 2023-03-27 | End: 2023-07-26 | Stop reason: SDUPTHER

## 2023-03-27 RX ORDER — GLIPIZIDE 2.5 MG/1
5 TABLET, EXTENDED RELEASE ORAL
Qty: 90 TABLET | Refills: 1 | Status: SHIPPED | OUTPATIENT
Start: 2023-03-27 | End: 2023-07-26 | Stop reason: SDUPTHER

## 2023-03-27 RX ORDER — IBUPROFEN 800 MG/1
800 TABLET ORAL EVERY 6 HOURS PRN
Qty: 60 TABLET | Refills: 2 | Status: SHIPPED | OUTPATIENT
Start: 2023-03-27 | End: 2024-03-14 | Stop reason: SDUPTHER

## 2023-03-27 RX ORDER — VERAPAMIL HYDROCHLORIDE 240 MG/1
240 TABLET, FILM COATED, EXTENDED RELEASE ORAL DAILY
Qty: 90 TABLET | Refills: 1 | Status: SHIPPED | OUTPATIENT
Start: 2023-03-27 | End: 2023-07-26 | Stop reason: SDUPTHER

## 2023-03-27 RX ORDER — POTASSIUM CHLORIDE 750 MG/1
10 TABLET, EXTENDED RELEASE ORAL DAILY
Qty: 90 TABLET | Refills: 1 | Status: SHIPPED | OUTPATIENT
Start: 2023-03-27 | End: 2023-07-26 | Stop reason: SDUPTHER

## 2023-03-27 RX ORDER — LORATADINE 10 MG/1
10 TABLET ORAL DAILY
Qty: 90 TABLET | Refills: 1 | Status: SHIPPED | OUTPATIENT
Start: 2023-03-27 | End: 2023-07-26 | Stop reason: SDUPTHER

## 2023-03-27 RX ORDER — LISINOPRIL 10 MG/1
10 TABLET ORAL DAILY
Qty: 90 TABLET | Refills: 1 | Status: SHIPPED | OUTPATIENT
Start: 2023-03-27 | End: 2023-07-26 | Stop reason: SDUPTHER

## 2023-03-27 RX ORDER — PANTOPRAZOLE SODIUM 40 MG/1
40 TABLET, DELAYED RELEASE ORAL DAILY
Qty: 90 TABLET | Refills: 1 | Status: SHIPPED | OUTPATIENT
Start: 2023-03-27 | End: 2023-07-26 | Stop reason: SDUPTHER

## 2023-03-27 RX ORDER — CLOBETASOL PROPIONATE 0.5 MG/G
CREAM TOPICAL 2 TIMES DAILY
Qty: 30 G | Refills: 1 | Status: SHIPPED | OUTPATIENT
Start: 2023-03-27 | End: 2023-12-06 | Stop reason: SDUPTHER

## 2023-03-27 NOTE — PROGRESS NOTES
TU Sanchez   [facility] [address] [place] [address]  [phone]      PATIENT NAME: Judy Miller  : 1950  DATE: 3/27/23  MRN: [id number]      Billing Provider: TU Sanchez  Level of Service:   Patient PCP Information       Provider PCP Type    TU Sanchez General            Reason for Visit / Chief Complaint: Hypertension, Hyperlipidemia, Diabetes, and Follow-up (3 mos)       Update PCP  Update Chief Complaint         History of Present Illness / Problem Focused Workflow     Judy Miller presents to the clinic with Hypertension, Hyperlipidemia, Diabetes, and Follow-up (3 mos)     Pt presents for routine follow up with lab and med refills. Overall doing well.      BP appears poorly controlled today. Home meds reviewed, pt reports no meds this am. Encouraged pt to take meds prior to appt.   Advised to monitor BP at home. Advised on optimal BP readings - SBP < 130 & DBP < 80. Advised to call office for any persistent BP elevation and may have to prescribe or adjust BP med(s).  Recommended DASH diet, stay well hydrated with water daily, eliminate or decrease caffeinated and high calorie drinks, increase physical activity, and lose weight if BMI > 25.0.    Diabetes treatment goals: (unless otherwise indicated due to risk factor stratification)  To achieve normal or near normal glucose levels.  Fasting glucose:   Before meals: 100-140  2 hours after meal:less 150  Bedtime goal glucose > 100      Discussed need for low fat/low cholesterol diet, regular exercise, and weight control.   Cardiovascular risk and specific lipid/LDL goals reviewed.      Review of Systems     Review of Systems   Constitutional:  Negative for fatigue and fever.   HENT:  Negative for nasal congestion and sore throat.    Eyes:  Negative for visual disturbance.   Respiratory:  Negative for chest tightness  and shortness of breath.    Cardiovascular:  Negative for chest pain and leg swelling.   Gastrointestinal:  Negative for abdominal pain, change in bowel habit and change in bowel habit.   Endocrine: Negative for polydipsia, polyphagia and polyuria.   Genitourinary:  Negative for dysuria and hematuria.   Musculoskeletal:  Negative for back pain and leg pain.   Integumentary:  Negative for rash.   Neurological:  Negative for dizziness, syncope, weakness and light-headedness.      Medical / Social / Family History     Past Medical History:   Diagnosis Date    Diabetes mellitus, type 2     GERD (gastroesophageal reflux disease)     Hyperlipidemia     Hypertension        Past Surgical History:   Procedure Laterality Date    HIP REPLACEMENT ARTHROPLASTY Left     TUBAL LIGATION         Social History  Ms. Miller  reports that she has never smoked. She has never used smokeless tobacco. She reports that she does not currently use alcohol. She reports that she does not use drugs.    Family History  Ms. Miller's family history includes Diabetes in her mother and sister; Heart disease in her mother; Hypertension in her mother.    Medications and Allergies     Medications  Outpatient Medications Marked as Taking for the 3/27/23 encounter (Office Visit) with TU Sanchez   Medication Sig Dispense Refill    aspirin (ECOTRIN) 81 MG EC tablet Take 81 mg by mouth once daily.      EScitalopram oxalate (LEXAPRO) 10 MG tablet Take 1 tablet (10 mg total) by mouth once daily. 90 tablet 1    fluticasone propionate (FLONASE) 50 mcg/actuation nasal spray 2 sprays (100 mcg total) by Each Nostril route once daily. 16 g 2    polyethylene glycol (GLYCOLAX) 17 gram PwPk Take 17 g by mouth once daily. Mixed with 8 oz water, juice, soda, coffee or tea      [DISCONTINUED] clobetasoL (TEMOVATE) 0.05 % cream Apply topically 2 (two) times daily. Apply a thin layer to the affected area(s)      [DISCONTINUED] glipiZIDE (GLUCOTROL) 2.5 MG TR24  Take 2 tablets (5 mg total) by mouth daily with breakfast. 90 tablet 1    [DISCONTINUED] ibuprofen (ADVIL,MOTRIN) 600 MG tablet Take 1 tablet (600 mg total) by mouth every 6 (six) hours as needed for Pain. 60 tablet 1       Allergies  Review of patient's allergies indicates:   Allergen Reactions    Pneumococcal vaccine Swelling       Physical Examination     Vitals:    03/27/23 1428   BP: 138/89   Pulse:    Resp:    Temp:      Physical Exam  Eyes:      Pupils: Pupils are equal, round, and reactive to light.   Cardiovascular:      Rate and Rhythm: Normal rate and regular rhythm.      Pulses:           Dorsalis pedis pulses are 2+ on the right side and 2+ on the left side.      Heart sounds: Normal heart sounds. No murmur heard.  Pulmonary:      Breath sounds: Normal breath sounds. No wheezing, rhonchi or rales.   Abdominal:      General: Bowel sounds are normal.   Musculoskeletal:         General: No swelling.      Cervical back: Normal range of motion and neck supple.   Feet:      Right foot:      Protective Sensation: 10 sites tested.  10 sites sensed.      Skin integrity: Skin integrity normal.      Toenail Condition: Right toenails are normal.      Left foot:      Protective Sensation: 10 sites tested.  10 sites sensed.      Skin integrity: Skin integrity normal.      Comments: Protective Sensation (w/ 10 gram monofilament):  Right: Intact  Left: Intact    Visual Inspection:  Normal -  Bilateral    Pedal Pulses:   Right: Present  Left: Present    Posterior Tibialis Pulses:   Right:Present  Left: Present     Skin:     General: Skin is warm and dry.   Neurological:      Mental Status: She is alert and oriented to person, place, and time.        Assessment and Plan (including Health Maintenance)      Problem List  Smart Sets  Document Outside HM   :    Plan:   1. Hypertension, unspecified type  Comments:  no meds this am  encourage daily meds and home monitoring   Orders:  -     Comprehensive Metabolic Panel;  Future; Expected date: 03/27/2023  -     hydroCHLOROthiazide (HYDRODIURIL) 25 MG tablet; Take 1 tablet (25 mg total) by mouth once daily.  Dispense: 90 tablet; Refill: 1  -     lisinopriL 10 MG tablet; Take 1 tablet (10 mg total) by mouth once daily.  Dispense: 90 tablet; Refill: 1  -     potassium chloride SA (K-DUR,KLOR-CON M) 10 MEQ tablet; Take 1 tablet (10 mEq total) by mouth once daily. With food  Dispense: 90 tablet; Refill: 1  -     verapamiL (CALAN-SR) 240 MG CR tablet; Take 1 tablet (240 mg total) by mouth once daily. With food  Dispense: 90 tablet; Refill: 1    2. Hyperlipidemia, unspecified hyperlipidemia type  -     Lipid Panel; Future; Expected date: 03/27/2023  -     pravastatin (PRAVACHOL) 20 MG tablet; Take 1 tablet (20 mg total) by mouth every evening.  Dispense: 90 tablet; Refill: 1    3. Type 2 diabetes mellitus without complication, without long-term current use of insulin  Comments:  reports well controlled  cont home meds   Orders:  -     Hemoglobin A1C; Future; Expected date: 03/27/2023  -     glipiZIDE (GLUCOTROL) 2.5 MG TR24; Take 2 tablets (5 mg total) by mouth daily with breakfast.  Dispense: 90 tablet; Refill: 1    Other orders  -     loratadine (CLARITIN) 10 mg tablet; Take 1 tablet (10 mg total) by mouth once daily.  Dispense: 90 tablet; Refill: 1  -     pantoprazole (PROTONIX) 40 MG tablet; Take 1 tablet (40 mg total) by mouth once daily.  Dispense: 90 tablet; Refill: 1  -     clobetasoL (TEMOVATE) 0.05 % cream; Apply topically 2 (two) times daily. Apply a thin layer to the affected area(s)  Dispense: 30 g; Refill: 1  -     ibuprofen (ADVIL,MOTRIN) 800 MG tablet; Take 1 tablet (800 mg total) by mouth every 6 (six) hours as needed for Pain.  Dispense: 60 tablet; Refill: 2           Health Maintenance Due   Topic Date Due    Foot Exam  Never done    DEXA Scan  Never done    TETANUS VACCINE  01/04/2023       Problem List Items Addressed This Visit          Cardiac/Vascular     Hypertension - Primary    Relevant Medications    hydroCHLOROthiazide (HYDRODIURIL) 25 MG tablet    lisinopriL 10 MG tablet    potassium chloride SA (K-DUR,KLOR-CON M) 10 MEQ tablet    verapamiL (CALAN-SR) 240 MG CR tablet    Other Relevant Orders    Comprehensive Metabolic Panel    Hyperlipidemia    Relevant Medications    pravastatin (PRAVACHOL) 20 MG tablet    Other Relevant Orders    Lipid Panel       Endocrine    Type 2 diabetes mellitus without complication, without long-term current use of insulin    Relevant Medications    glipiZIDE (GLUCOTROL) 2.5 MG TR24    Other Relevant Orders    Hemoglobin A1C       Health Maintenance Topics with due status: Not Due       Topic Last Completion Date    Colorectal Cancer Screening 01/09/2017    Diabetes Urine Screening 08/08/2022    Mammogram 08/15/2022    Lipid Panel 12/19/2022    Hemoglobin A1c 12/19/2022    Low Dose Statin 03/27/2023       Future Appointments   Date Time Provider Department Center   7/27/2023 10:00 AM TU Sanchez [facility] [place]   8/21/2023 10:15 AM [facility]   [facility]/21/2023 10[facility]40 [facility]   9/6/2023  8:00 AM AWV NURSE, [facility] [place]            Signature:  TU Sanchez  [facility]  [address] [place] [id number]  [phone]    Date of encounter: 3/27/23

## 2023-06-09 DIAGNOSIS — Z71.89 COMPLEX CARE COORDINATION: ICD-10-CM

## 2023-07-27 ENCOUNTER — OFFICE VISIT (OUTPATIENT)
Dept: FAMILY MEDICINE | Facility: CLINIC | Age: 73
End: 2023-07-27
Payer: MEDICARE

## 2023-07-27 VITALS
HEIGHT: 66 IN | TEMPERATURE: 99 F | RESPIRATION RATE: 16 BRPM | DIASTOLIC BLOOD PRESSURE: 82 MMHG | WEIGHT: 226 LBS | OXYGEN SATURATION: 97 % | SYSTOLIC BLOOD PRESSURE: 148 MMHG | HEART RATE: 90 BPM | BODY MASS INDEX: 36.32 KG/M2

## 2023-07-27 DIAGNOSIS — E78.5 HYPERLIPIDEMIA, UNSPECIFIED HYPERLIPIDEMIA TYPE: ICD-10-CM

## 2023-07-27 DIAGNOSIS — E11.9 TYPE 2 DIABETES MELLITUS WITHOUT COMPLICATION, WITHOUT LONG-TERM CURRENT USE OF INSULIN: ICD-10-CM

## 2023-07-27 DIAGNOSIS — I10 HYPERTENSION, UNSPECIFIED TYPE: Primary | ICD-10-CM

## 2023-07-27 DIAGNOSIS — N18.31 CHRONIC KIDNEY DISEASE, STAGE 3A: ICD-10-CM

## 2023-07-27 PROBLEM — R11.0 NAUSEA: Status: RESOLVED | Noted: 2022-10-14 | Resolved: 2023-07-27

## 2023-07-27 PROBLEM — K30 UPSET STOMACH: Status: RESOLVED | Noted: 2022-10-14 | Resolved: 2023-07-27

## 2023-07-27 LAB
ALBUMIN SERPL BCP-MCNC: 4.1 G/DL (ref 3.5–5)
ALBUMIN/GLOB SERPL: 0.9 {RATIO}
ALP SERPL-CCNC: 61 U/L (ref 55–142)
ALT SERPL W P-5'-P-CCNC: 21 U/L (ref 13–56)
ANION GAP SERPL CALCULATED.3IONS-SCNC: 11 MMOL/L (ref 7–16)
AST SERPL W P-5'-P-CCNC: 17 U/L (ref 15–37)
BILIRUB SERPL-MCNC: 0.5 MG/DL (ref ?–1.2)
BUN SERPL-MCNC: 13 MG/DL (ref 7–18)
BUN/CREAT SERPL: 13 (ref 6–20)
CALCIUM SERPL-MCNC: 9.3 MG/DL (ref 8.5–10.1)
CHLORIDE SERPL-SCNC: 104 MMOL/L (ref 98–107)
CHOLEST SERPL-MCNC: 174 MG/DL (ref 0–200)
CHOLEST/HDLC SERPL: 2.5 {RATIO}
CO2 SERPL-SCNC: 28 MMOL/L (ref 21–32)
CREAT SERPL-MCNC: 1.01 MG/DL (ref 0.55–1.02)
EGFR (NO RACE VARIABLE) (RUSH/TITUS): 59 ML/MIN/1.73M2
EST. AVERAGE GLUCOSE BLD GHB EST-MCNC: 81 MG/DL
GLOBULIN SER-MCNC: 4.5 G/DL (ref 2–4)
GLUCOSE SERPL-MCNC: 104 MG/DL (ref 74–106)
HBA1C MFR BLD HPLC: 5 % (ref 4.5–6.6)
HDLC SERPL-MCNC: 69 MG/DL (ref 40–60)
LDLC SERPL CALC-MCNC: 90 MG/DL
LDLC/HDLC SERPL: 1.3 {RATIO}
NONHDLC SERPL-MCNC: 105 MG/DL
POTASSIUM SERPL-SCNC: 3.8 MMOL/L (ref 3.5–5.1)
PROT SERPL-MCNC: 8.6 G/DL (ref 6.4–8.2)
SODIUM SERPL-SCNC: 139 MMOL/L (ref 136–145)
TRIGL SERPL-MCNC: 76 MG/DL (ref 35–150)
VLDLC SERPL-MCNC: 15 MG/DL

## 2023-07-27 PROCEDURE — 83036 HEMOGLOBIN GLYCOSYLATED A1C: CPT | Mod: ,,, | Performed by: CLINICAL MEDICAL LABORATORY

## 2023-07-27 PROCEDURE — 1160F PR REVIEW ALL MEDS BY PRESCRIBER/CLIN PHARMACIST DOCUMENTED: ICD-10-PCS | Mod: ,,, | Performed by: NURSE PRACTITIONER

## 2023-07-27 PROCEDURE — 83036 HEMOGLOBIN A1C: ICD-10-PCS | Mod: ,,, | Performed by: CLINICAL MEDICAL LABORATORY

## 2023-07-27 PROCEDURE — 99213 PR OFFICE/OUTPT VISIT, EST, LEVL III, 20-29 MIN: ICD-10-PCS | Mod: ,,, | Performed by: NURSE PRACTITIONER

## 2023-07-27 PROCEDURE — 3008F PR BODY MASS INDEX (BMI) DOCUMENTED: ICD-10-PCS | Mod: ,,, | Performed by: NURSE PRACTITIONER

## 2023-07-27 PROCEDURE — 99213 OFFICE O/P EST LOW 20 MIN: CPT | Mod: ,,, | Performed by: NURSE PRACTITIONER

## 2023-07-27 PROCEDURE — 3044F PR MOST RECENT HEMOGLOBIN A1C LEVEL <7.0%: ICD-10-PCS | Mod: ,,, | Performed by: NURSE PRACTITIONER

## 2023-07-27 PROCEDURE — 3008F BODY MASS INDEX DOCD: CPT | Mod: ,,, | Performed by: NURSE PRACTITIONER

## 2023-07-27 PROCEDURE — 4010F ACE/ARB THERAPY RXD/TAKEN: CPT | Mod: ,,, | Performed by: NURSE PRACTITIONER

## 2023-07-27 PROCEDURE — 80061 LIPID PANEL: ICD-10-PCS | Mod: ,,, | Performed by: CLINICAL MEDICAL LABORATORY

## 2023-07-27 PROCEDURE — 3077F PR MOST RECENT SYSTOLIC BLOOD PRESSURE >= 140 MM HG: ICD-10-PCS | Mod: ,,, | Performed by: NURSE PRACTITIONER

## 2023-07-27 PROCEDURE — 3079F DIAST BP 80-89 MM HG: CPT | Mod: ,,, | Performed by: NURSE PRACTITIONER

## 2023-07-27 PROCEDURE — 80053 COMPREHENSIVE METABOLIC PANEL: ICD-10-PCS | Mod: ,,, | Performed by: CLINICAL MEDICAL LABORATORY

## 2023-07-27 PROCEDURE — 3077F SYST BP >= 140 MM HG: CPT | Mod: ,,, | Performed by: NURSE PRACTITIONER

## 2023-07-27 PROCEDURE — 1160F RVW MEDS BY RX/DR IN RCRD: CPT | Mod: ,,, | Performed by: NURSE PRACTITIONER

## 2023-07-27 PROCEDURE — 3079F PR MOST RECENT DIASTOLIC BLOOD PRESSURE 80-89 MM HG: ICD-10-PCS | Mod: ,,, | Performed by: NURSE PRACTITIONER

## 2023-07-27 PROCEDURE — 80053 COMPREHEN METABOLIC PANEL: CPT | Mod: ,,, | Performed by: CLINICAL MEDICAL LABORATORY

## 2023-07-27 PROCEDURE — 4010F PR ACE/ARB THEARPY RXD/TAKEN: ICD-10-PCS | Mod: ,,, | Performed by: NURSE PRACTITIONER

## 2023-07-27 PROCEDURE — 80061 LIPID PANEL: CPT | Mod: ,,, | Performed by: CLINICAL MEDICAL LABORATORY

## 2023-07-27 PROCEDURE — 1159F MED LIST DOCD IN RCRD: CPT | Mod: ,,, | Performed by: NURSE PRACTITIONER

## 2023-07-27 PROCEDURE — 3044F HG A1C LEVEL LT 7.0%: CPT | Mod: ,,, | Performed by: NURSE PRACTITIONER

## 2023-07-27 PROCEDURE — 1159F PR MEDICATION LIST DOCUMENTED IN MEDICAL RECORD: ICD-10-PCS | Mod: ,,, | Performed by: NURSE PRACTITIONER

## 2023-07-27 RX ORDER — ESCITALOPRAM OXALATE 10 MG/1
10 TABLET ORAL DAILY
Qty: 90 TABLET | Refills: 1 | Status: SHIPPED | OUTPATIENT
Start: 2023-07-27 | End: 2024-03-26 | Stop reason: SDUPTHER

## 2023-07-27 RX ORDER — LORATADINE 10 MG/1
10 TABLET ORAL DAILY
Qty: 90 TABLET | Refills: 1 | Status: SHIPPED | OUTPATIENT
Start: 2023-07-27 | End: 2023-12-06 | Stop reason: SDUPTHER

## 2023-07-27 RX ORDER — PRAVASTATIN SODIUM 20 MG/1
20 TABLET ORAL NIGHTLY
Qty: 90 TABLET | Refills: 1 | Status: SHIPPED | OUTPATIENT
Start: 2023-07-27 | End: 2023-12-06 | Stop reason: SDUPTHER

## 2023-07-27 RX ORDER — PANTOPRAZOLE SODIUM 40 MG/1
40 TABLET, DELAYED RELEASE ORAL DAILY
Qty: 90 TABLET | Refills: 1 | Status: SHIPPED | OUTPATIENT
Start: 2023-07-27 | End: 2023-12-06 | Stop reason: SDUPTHER

## 2023-07-27 RX ORDER — LISINOPRIL 10 MG/1
10 TABLET ORAL DAILY
Qty: 90 TABLET | Refills: 1 | Status: SHIPPED | OUTPATIENT
Start: 2023-07-27 | End: 2023-11-29 | Stop reason: SDUPTHER

## 2023-07-27 RX ORDER — VERAPAMIL HYDROCHLORIDE 240 MG/1
240 TABLET, FILM COATED, EXTENDED RELEASE ORAL DAILY
Qty: 90 TABLET | Refills: 1 | Status: SHIPPED | OUTPATIENT
Start: 2023-07-27 | End: 2023-12-06 | Stop reason: SDUPTHER

## 2023-07-27 RX ORDER — POTASSIUM CHLORIDE 750 MG/1
10 TABLET, EXTENDED RELEASE ORAL DAILY
Qty: 90 TABLET | Refills: 1 | Status: SHIPPED | OUTPATIENT
Start: 2023-07-27 | End: 2023-11-29 | Stop reason: SDUPTHER

## 2023-07-27 RX ORDER — GLIPIZIDE 2.5 MG/1
5 TABLET, EXTENDED RELEASE ORAL
Qty: 90 TABLET | Refills: 1 | Status: SHIPPED | OUTPATIENT
Start: 2023-07-27 | End: 2023-11-29 | Stop reason: SDUPTHER

## 2023-07-27 RX ORDER — HYDROCHLOROTHIAZIDE 25 MG/1
25 TABLET ORAL DAILY
Qty: 90 TABLET | Refills: 1 | Status: SHIPPED | OUTPATIENT
Start: 2023-07-27 | End: 2023-12-06 | Stop reason: SDUPTHER

## 2023-07-27 NOTE — ASSESSMENT & PLAN NOTE
Lab Results   Component Value Date    HGBA1C 5.0 03/27/2023    HGBA1C 4.9 12/19/2022    HGBA1C 5.3 08/08/2022     Lab Results   Component Value Date    MICROALBUR 0.7 08/08/2022    LDLCALC 92 03/27/2023    CREATININE 1.00 03/27/2023

## 2023-07-27 NOTE — PROGRESS NOTES
TU Sanchez   [facility] [address] [place] [address]  [phone]      PATIENT NAME: Judy Miller  : 1950  DATE: 23  MRN: [id number]      Billing Provider: TU Sanchez  Level of Service:   Patient PCP Information       Provider PCP Type    TU Sanchez General            Reason for Visit / Chief Complaint: Hyperlipidemia, Hypertension, Diabetes, and Follow-up       Update PCP  Update Chief Complaint         History of Present Illness / Problem Focused Workflow     Judy Miller presents to the clinic with Hyperlipidemia, Hypertension, Diabetes, and Follow-up     Pt presents for routine follow up with lab and med refills. Overall doing well.      BP appears  controlled today. Home meds reviewed  The current medical regimen is effective;  continue present plan and medications. Recommended patient to check home readings to monitor and see me for followup as scheduled or sooner as needed.   Discussed sodium restriction, maintaining ideal body weight and regular exercise program as physiologic means to continue to achieve blood pressure control in addition to medication compliance.  Patient was educated that both decongestant and anti-inflammatory medication may raise blood pressure.    Discussed need for low fat/low cholesterol diet, regular exercise, and weight control.   Cardiovascular risk and specific lipid/LDL goals reviewed.      Diabetes treatment goals: (unless otherwise indicated due to risk factor stratification)  To achieve normal or near normal glucose levels.  Fasting glucose:   Before meals: 100-140  2 hours after meal:less 150  Bedtime goal glucose > 100          Review of Systems     Review of Systems   Constitutional:  Negative for fatigue and fever.   HENT:  Negative for nasal congestion and sore throat.    Eyes:  Negative for visual disturbance.    Respiratory:  Negative for chest tightness and shortness of breath.    Cardiovascular:  Negative for chest pain and leg swelling.   Gastrointestinal:  Negative for abdominal pain, change in bowel habit and change in bowel habit.   Endocrine: Negative for polydipsia, polyphagia and polyuria.   Genitourinary:  Negative for dysuria and hematuria.   Musculoskeletal:  Negative for back pain and leg pain.   Integumentary:  Negative for rash.   Neurological:  Negative for dizziness, syncope, weakness and light-headedness.      Medical / Social / Family History     Past Medical History:   Diagnosis Date    Diabetes mellitus, type 2     GERD (gastroesophageal reflux disease)     Hyperlipidemia     Hypertension        Past Surgical History:   Procedure Laterality Date    HIP REPLACEMENT ARTHROPLASTY Left     TUBAL LIGATION         Social History  Ms. Miller  reports that she has never smoked. She has never used smokeless tobacco. She reports that she does not currently use alcohol. She reports that she does not use drugs.    Family History  Ms. Miller's family history includes Diabetes in her mother and sister; Heart disease in her mother; Hypertension in her mother.    Medications and Allergies     Medications  Outpatient Medications Marked as Taking for the 7/27/23 encounter (Office Visit) with TU Sanchez   Medication Sig Dispense Refill    aspirin (ECOTRIN) 81 MG EC tablet Take 81 mg by mouth once daily.      clobetasoL (TEMOVATE) 0.05 % cream Apply topically 2 (two) times daily. Apply a thin layer to the affected area(s) 30 g 1    fluticasone propionate (FLONASE) 50 mcg/actuation nasal spray 2 sprays (100 mcg total) by Each Nostril route once daily. 16 g 2    ibuprofen (ADVIL,MOTRIN) 800 MG tablet Take 1 tablet (800 mg total) by mouth every 6 (six) hours as needed for Pain. 60 tablet 2    polyethylene glycol (GLYCOLAX) 17 gram PwPk Take 17 g by mouth once daily. Mixed with 8 oz water, juice, soda, coffee or  tea         Allergies  Review of patient's allergies indicates:   Allergen Reactions    Pneumococcal vaccine Swelling       Physical Examination     Vitals:    07/27/23 0956   BP: (!) 148/82   Pulse: 90   Resp: 16   Temp: 98.6 °F (37 °C)     Physical Exam  Eyes:      Pupils: Pupils are equal, round, and reactive to light.   Cardiovascular:      Rate and Rhythm: Normal rate and regular rhythm.      Heart sounds: Normal heart sounds. No murmur heard.  Pulmonary:      Breath sounds: Normal breath sounds. No wheezing, rhonchi or rales.   Abdominal:      General: Bowel sounds are normal.   Musculoskeletal:         General: No swelling.      Cervical back: Normal range of motion and neck supple.   Skin:     General: Skin is warm and dry.   Neurological:      Mental Status: She is alert and oriented to person, place, and time.        Lab Results   Component Value Date    WBC 6.70 08/08/2022    HGB 14.6 08/08/2022    HCT 43.7 08/08/2022    MCV 93.4 08/08/2022     08/08/2022        Sodium   Date Value Ref Range Status   03/27/2023 140 136 - 145 mmol/L Final     Potassium   Date Value Ref Range Status   03/27/2023 3.4 (L) 3.5 - 5.1 mmol/L Final     Chloride   Date Value Ref Range Status   03/27/2023 101 98 - 107 mmol/L Final     CO2   Date Value Ref Range Status   03/27/2023 32 21 - 32 mmol/L Final     Glucose   Date Value Ref Range Status   03/27/2023 135 (H) 74 - 106 mg/dL Final     BUN   Date Value Ref Range Status   03/27/2023 17 7 - 18 mg/dL Final     Creatinine   Date Value Ref Range Status   03/27/2023 1.00 0.55 - 1.02 mg/dL Final     Calcium   Date Value Ref Range Status   03/27/2023 9.3 8.5 - 10.1 mg/dL Final     Total Protein   Date Value Ref Range Status   03/27/2023 8.2 6.4 - 8.2 g/dL Final     Albumin   Date Value Ref Range Status   03/27/2023 4.0 3.5 - 5.0 g/dL Final     Bilirubin, Total   Date Value Ref Range Status   03/27/2023 0.6 >0.0 - 1.2 mg/dL Final     Alk Phos   Date Value Ref Range Status    03/27/2023 60 55 - 142 U/L Final     AST   Date Value Ref Range Status   03/27/2023 12 (L) 15 - 37 U/L Final     ALT   Date Value Ref Range Status   03/27/2023 21 13 - 56 U/L Final     Anion Gap   Date Value Ref Range Status   03/27/2023 10 7 - 16 mmol/L Final     eGFR   Date Value Ref Range Status   03/27/2023 60 >=60 mL/min/1.73m² Final      Lab Results   Component Value Date    HGBA1C 5.0 03/27/2023      Lab Results   Component Value Date    CHOL 177 03/27/2023    CHOL 175 12/19/2022    CHOL 162 08/08/2022     Lab Results   Component Value Date    HDL 76 (H) 03/27/2023    HDL 75 (H) 12/19/2022    HDL 60 08/08/2022     Lab Results   Component Value Date    LDLCALC 92 03/27/2023    LDLCALC 82 12/19/2022    LDLCALC 88 08/08/2022     No results found for: DLDL  Lab Results   Component Value Date    TRIG 44 03/27/2023    TRIG 91 12/19/2022    TRIG 68 08/08/2022     Lab Results   Component Value Date    CHOLHDL 2.3 03/27/2023    CHOLHDL 2.3 12/19/2022    CHOLHDL 2.7 08/08/2022      No results found for: TSH, W8ERWZP, O9EXWEB, THYROIDAB, FREET4     Assessment and Plan (including Health Maintenance)      Problem List  Smart Sets  Document Outside HM   :    Plan:     1. Hypertension, unspecified type  Assessment & Plan:  BP Readings from Last 3 Encounters:   07/27/23 (!) 148/82   03/27/23 138/89   12/19/22 125/73     The current medical regimen is effective;  continue present plan and medications.      Orders:  -     Comprehensive Metabolic Panel; Future; Expected date: 07/27/2023  -     hydroCHLOROthiazide (HYDRODIURIL) 25 MG tablet; Take 1 tablet (25 mg total) by mouth once daily.  Dispense: 90 tablet; Refill: 1  -     lisinopriL 10 MG tablet; Take 1 tablet (10 mg total) by mouth once daily.  Dispense: 90 tablet; Refill: 1  -     potassium chloride SA (K-DUR,KLOR-CON M) 10 MEQ tablet; Take 1 tablet (10 mEq total) by mouth once daily. With food  Dispense: 90 tablet; Refill: 1  -     verapamiL (CALAN-SR) 240 MG CR  tablet; Take 1 tablet (240 mg total) by mouth once daily. With food  Dispense: 90 tablet; Refill: 1    2. Hyperlipidemia, unspecified hyperlipidemia type  Assessment & Plan:  Lab Results   Component Value Date    CHOL 177 03/27/2023    CHOL 175 12/19/2022    CHOL 162 08/08/2022     Lab Results   Component Value Date    HDL 76 (H) 03/27/2023    HDL 75 (H) 12/19/2022    HDL 60 08/08/2022     Lab Results   Component Value Date    LDLCALC 92 03/27/2023    LDLCALC 82 12/19/2022    LDLCALC 88 08/08/2022     No results found for: DLDL  Lab Results   Component Value Date    TRIG 44 03/27/2023    TRIG 91 12/19/2022    TRIG 68 08/08/2022       f1   Lab Results   Component Value Date    CHOLHDL 2.3 03/27/2023    CHOLHDL 2.3 12/19/2022    CHOLHDL 2.7 08/08/2022     The current medical regimen is effective;  continue present plan and medications.      Orders:  -     Lipid Panel; Future; Expected date: 07/27/2023  -     pravastatin (PRAVACHOL) 20 MG tablet; Take 1 tablet (20 mg total) by mouth every evening.  Dispense: 90 tablet; Refill: 1    3. Type 2 diabetes mellitus without complication, without long-term current use of insulin  Assessment & Plan:  Lab Results   Component Value Date    HGBA1C 5.0 03/27/2023    HGBA1C 4.9 12/19/2022    HGBA1C 5.3 08/08/2022     Lab Results   Component Value Date    MICROALBUR 0.7 08/08/2022    LDLCALC 92 03/27/2023    CREATININE 1.00 03/27/2023       Orders:  -     Hemoglobin A1C; Future; Expected date: 07/27/2023  -     glipiZIDE (GLUCOTROL) 2.5 MG TR24; Take 2 tablets (5 mg total) by mouth daily with breakfast.  Dispense: 90 tablet; Refill: 1    4. Chronic kidney disease, stage 3a  Assessment & Plan:  The current medical regimen is effective;  continue present plan and medications.        Other orders  -     EScitalopram oxalate (LEXAPRO) 10 MG tablet; Take 1 tablet (10 mg total) by mouth once daily.  Dispense: 90 tablet; Refill: 1  -     loratadine (CLARITIN) 10 mg tablet; Take 1 tablet (10  mg total) by mouth once daily.  Dispense: 90 tablet; Refill: 1  -     pantoprazole (PROTONIX) 40 MG tablet; Take 1 tablet (40 mg total) by mouth once daily.  Dispense: 90 tablet; Refill: 1         There are no Patient Instructions on file for this visit.     Health Maintenance Due   Topic Date Due    Eye Exam  Never done    DEXA Scan  Never done    Diabetes Urine Screening  08/08/2023    Mammogram  08/15/2023         Health Maintenance Topics with due status: Not Due       Topic Last Completion Date    Colorectal Cancer Screening 01/09/2017    Influenza Vaccine 10/05/2022    Foot Exam 03/27/2023    Lipid Panel 03/27/2023    Hemoglobin A1c 03/27/2023    Low Dose Statin 07/27/2023       Future Appointments   Date Time Provider Department Center   8/21/2023 10:15 AM [facility]   8/21/2023 10:40 AM [facility]   9/6/2023  8:00 AM AWV NURSE, [facility] [place]   12/6/2023 10:00 AM TU Sanchez [facility] [place]            Signature:  TU Sanchez  [facility]  [address] [place] [address] [place] [address]  [phone]    Date of encounter: 7/27/23

## 2023-07-27 NOTE — ASSESSMENT & PLAN NOTE
Lab Results   Component Value Date    CHOL 177 03/27/2023    CHOL 175 12/19/2022    CHOL 162 08/08/2022     Lab Results   Component Value Date    HDL 76 (H) 03/27/2023    HDL 75 (H) 12/19/2022    HDL 60 08/08/2022     Lab Results   Component Value Date    LDLCALC 92 03/27/2023    LDLCALC 82 12/19/2022    LDLCALC 88 08/08/2022     No results found for: DLDL  Lab Results   Component Value Date    TRIG 44 03/27/2023    TRIG 91 12/19/2022    TRIG 68 08/08/2022       f1   Lab Results   Component Value Date    CHOLHDL 2.3 03/27/2023    CHOLHDL 2.3 12/19/2022    CHOLHDL 2.7 08/08/2022     The current medical regimen is effective;  continue present plan and medications.

## 2023-07-27 NOTE — ASSESSMENT & PLAN NOTE
BP Readings from Last 3 Encounters:   07/27/23 (!) 148/82   03/27/23 138/89   12/19/22 125/73     The current medical regimen is effective;  continue present plan and medications.

## 2023-08-21 ENCOUNTER — HOSPITAL ENCOUNTER (OUTPATIENT)
Dept: RADIOLOGY | Facility: HOSPITAL | Age: 73
Discharge: HOME | End: 2023-08-21
Attending: NURSE PRACTITIONER
Payer: MEDICARE

## 2023-08-21 VITALS — HEIGHT: 66 IN | BODY MASS INDEX: 36.32 KG/M2 | WEIGHT: 226 LBS

## 2023-08-21 DIAGNOSIS — M85.89 OTHER SPECIFIED DISORDERS OF BONE DENSITY AND STRUCTURE, MULTIPLE SITES: ICD-10-CM

## 2023-08-21 DIAGNOSIS — Z12.31 OTHER SCREENING MAMMOGRAM: ICD-10-CM

## 2023-08-21 PROCEDURE — 77080 DXA BONE DENSITY AXIAL: CPT | Mod: TC

## 2023-08-21 PROCEDURE — 77080 DEXA BONE DENSITY SPINE HIP: ICD-10-PCS | Mod: 26,,, | Performed by: RADIOLOGY

## 2023-08-21 PROCEDURE — 77080 DXA BONE DENSITY AXIAL: CPT | Mod: 26,,, | Performed by: RADIOLOGY

## 2023-08-21 PROCEDURE — 77067 SCR MAMMO BI INCL CAD: CPT | Mod: TC

## 2023-10-04 RX ORDER — LANCETS 33 GAUGE
1 EACH MISCELLANEOUS DAILY
Qty: 200 EACH | Refills: 3 | Status: SHIPPED | OUTPATIENT
Start: 2023-10-04 | End: 2024-03-26 | Stop reason: SDUPTHER

## 2023-10-04 RX ORDER — DEXTROSE 4 G
1 TABLET,CHEWABLE ORAL DAILY
Qty: 1 EACH | Refills: 1 | Status: SHIPPED | OUTPATIENT
Start: 2023-10-04

## 2023-10-04 RX ORDER — BLOOD-GLUCOSE METER
1 EACH MISCELLANEOUS DAILY
Qty: 1 EACH | Refills: 1 | Status: SHIPPED | OUTPATIENT
Start: 2023-10-04 | End: 2024-03-26 | Stop reason: SDUPTHER

## 2023-11-27 ENCOUNTER — OFFICE VISIT (OUTPATIENT)
Dept: FAMILY MEDICINE | Facility: CLINIC | Age: 73
End: 2023-11-27
Payer: MEDICARE

## 2023-11-27 VITALS
OXYGEN SATURATION: 97 % | WEIGHT: 223 LBS | RESPIRATION RATE: 16 BRPM | DIASTOLIC BLOOD PRESSURE: 85 MMHG | SYSTOLIC BLOOD PRESSURE: 142 MMHG | BODY MASS INDEX: 35.84 KG/M2 | TEMPERATURE: 98 F | HEIGHT: 66 IN | HEART RATE: 87 BPM

## 2023-11-27 DIAGNOSIS — E78.5 HYPERLIPIDEMIA, UNSPECIFIED HYPERLIPIDEMIA TYPE: ICD-10-CM

## 2023-11-27 DIAGNOSIS — R25.2 MUSCLE CRAMPING: ICD-10-CM

## 2023-11-27 DIAGNOSIS — E55.9 VITAMIN D DEFICIENCY, UNSPECIFIED: ICD-10-CM

## 2023-11-27 DIAGNOSIS — R53.1 WEAKNESS: Primary | ICD-10-CM

## 2023-11-27 DIAGNOSIS — I10 HYPERTENSION, UNSPECIFIED TYPE: ICD-10-CM

## 2023-11-27 DIAGNOSIS — R26.89 OTHER ABNORMALITIES OF GAIT AND MOBILITY: ICD-10-CM

## 2023-11-27 DIAGNOSIS — R53.83 FATIGUE, UNSPECIFIED TYPE: ICD-10-CM

## 2023-11-27 DIAGNOSIS — E11.9 TYPE 2 DIABETES MELLITUS WITHOUT COMPLICATION, WITHOUT LONG-TERM CURRENT USE OF INSULIN: ICD-10-CM

## 2023-11-27 LAB
25(OH)D3 SERPL-MCNC: 22.9 NG/ML
ALBUMIN SERPL BCP-MCNC: 4.1 G/DL (ref 3.5–5)
ALBUMIN/GLOB SERPL: 1 {RATIO}
ALP SERPL-CCNC: 60 U/L (ref 55–142)
ALT SERPL W P-5'-P-CCNC: 22 U/L (ref 13–56)
ANION GAP SERPL CALCULATED.3IONS-SCNC: 9 MMOL/L (ref 7–16)
AST SERPL W P-5'-P-CCNC: 12 U/L (ref 15–37)
BILIRUB SERPL-MCNC: 0.5 MG/DL (ref ?–1.2)
BUN SERPL-MCNC: 12 MG/DL (ref 7–18)
BUN/CREAT SERPL: 12 (ref 6–20)
CALCIUM SERPL-MCNC: 9.1 MG/DL (ref 8.5–10.1)
CHLORIDE SERPL-SCNC: 108 MMOL/L (ref 98–107)
CHOLEST SERPL-MCNC: 165 MG/DL (ref 0–200)
CHOLEST/HDLC SERPL: 2.6 {RATIO}
CO2 SERPL-SCNC: 25 MMOL/L (ref 21–32)
CREAT SERPL-MCNC: 0.97 MG/DL (ref 0.55–1.02)
EGFR (NO RACE VARIABLE) (RUSH/TITUS): 62 ML/MIN/1.73M2
EST. AVERAGE GLUCOSE BLD GHB EST-MCNC: 100 MG/DL
FOLATE SERPL-MCNC: 14.3 NG/ML (ref 3.1–17.5)
GLOBULIN SER-MCNC: 4.3 G/DL (ref 2–4)
GLUCOSE SERPL-MCNC: 107 MG/DL (ref 74–106)
HBA1C MFR BLD HPLC: 5.1 % (ref 4.5–6.6)
HDLC SERPL-MCNC: 64 MG/DL (ref 40–60)
LDLC SERPL CALC-MCNC: 85 MG/DL
LDLC/HDLC SERPL: 1.3 {RATIO}
MAGNESIUM SERPL-MCNC: 2.3 MG/DL (ref 1.7–2.3)
NONHDLC SERPL-MCNC: 101 MG/DL
POTASSIUM SERPL-SCNC: 3.7 MMOL/L (ref 3.5–5.1)
PROT SERPL-MCNC: 8.4 G/DL (ref 6.4–8.2)
SODIUM SERPL-SCNC: 138 MMOL/L (ref 136–145)
T4 FREE SERPL-MCNC: 0.9 NG/DL (ref 0.76–1.46)
TRIGL SERPL-MCNC: 80 MG/DL (ref 35–150)
TSH SERPL DL<=0.005 MIU/L-ACNC: 1.17 UIU/ML (ref 0.36–3.74)
VIT B12 SERPL-MCNC: 597 PG/ML (ref 193–986)
VLDLC SERPL-MCNC: 16 MG/DL

## 2023-11-27 PROCEDURE — 83036 HEMOGLOBIN A1C: ICD-10-PCS | Mod: ,,, | Performed by: CLINICAL MEDICAL LABORATORY

## 2023-11-27 PROCEDURE — 3079F PR MOST RECENT DIASTOLIC BLOOD PRESSURE 80-89 MM HG: ICD-10-PCS | Mod: ,,, | Performed by: NURSE PRACTITIONER

## 2023-11-27 PROCEDURE — 99213 OFFICE O/P EST LOW 20 MIN: CPT | Mod: ,,, | Performed by: NURSE PRACTITIONER

## 2023-11-27 PROCEDURE — 84443 ASSAY THYROID STIM HORMONE: CPT | Mod: ,,, | Performed by: CLINICAL MEDICAL LABORATORY

## 2023-11-27 PROCEDURE — 99213 PR OFFICE/OUTPT VISIT, EST, LEVL III, 20-29 MIN: ICD-10-PCS | Mod: ,,, | Performed by: NURSE PRACTITIONER

## 2023-11-27 PROCEDURE — 83036 HEMOGLOBIN GLYCOSYLATED A1C: CPT | Mod: ,,, | Performed by: CLINICAL MEDICAL LABORATORY

## 2023-11-27 PROCEDURE — 80053 COMPREHENSIVE METABOLIC PANEL: ICD-10-PCS | Mod: ,,, | Performed by: CLINICAL MEDICAL LABORATORY

## 2023-11-27 PROCEDURE — 82306 VITAMIN D 25 HYDROXY: CPT | Mod: ,,, | Performed by: CLINICAL MEDICAL LABORATORY

## 2023-11-27 PROCEDURE — 3044F PR MOST RECENT HEMOGLOBIN A1C LEVEL <7.0%: ICD-10-PCS | Mod: ,,, | Performed by: NURSE PRACTITIONER

## 2023-11-27 PROCEDURE — 4010F ACE/ARB THERAPY RXD/TAKEN: CPT | Mod: ,,, | Performed by: NURSE PRACTITIONER

## 2023-11-27 PROCEDURE — 4010F PR ACE/ARB THEARPY RXD/TAKEN: ICD-10-PCS | Mod: ,,, | Performed by: NURSE PRACTITIONER

## 2023-11-27 PROCEDURE — 3044F HG A1C LEVEL LT 7.0%: CPT | Mod: ,,, | Performed by: NURSE PRACTITIONER

## 2023-11-27 PROCEDURE — 3077F PR MOST RECENT SYSTOLIC BLOOD PRESSURE >= 140 MM HG: ICD-10-PCS | Mod: ,,, | Performed by: NURSE PRACTITIONER

## 2023-11-27 PROCEDURE — 1160F PR REVIEW ALL MEDS BY PRESCRIBER/CLIN PHARMACIST DOCUMENTED: ICD-10-PCS | Mod: ,,, | Performed by: NURSE PRACTITIONER

## 2023-11-27 PROCEDURE — 80061 LIPID PANEL: ICD-10-PCS | Mod: ,,, | Performed by: CLINICAL MEDICAL LABORATORY

## 2023-11-27 PROCEDURE — 82746 ASSAY OF FOLIC ACID SERUM: CPT | Mod: ,,, | Performed by: CLINICAL MEDICAL LABORATORY

## 2023-11-27 PROCEDURE — 1159F MED LIST DOCD IN RCRD: CPT | Mod: ,,, | Performed by: NURSE PRACTITIONER

## 2023-11-27 PROCEDURE — 82306 VITAMIN D: ICD-10-PCS | Mod: ,,, | Performed by: CLINICAL MEDICAL LABORATORY

## 2023-11-27 PROCEDURE — 1159F PR MEDICATION LIST DOCUMENTED IN MEDICAL RECORD: ICD-10-PCS | Mod: ,,, | Performed by: NURSE PRACTITIONER

## 2023-11-27 PROCEDURE — 84439 THYROID PANEL: ICD-10-PCS | Mod: ,,, | Performed by: CLINICAL MEDICAL LABORATORY

## 2023-11-27 PROCEDURE — 1160F RVW MEDS BY RX/DR IN RCRD: CPT | Mod: ,,, | Performed by: NURSE PRACTITIONER

## 2023-11-27 PROCEDURE — 80061 LIPID PANEL: CPT | Mod: ,,, | Performed by: CLINICAL MEDICAL LABORATORY

## 2023-11-27 PROCEDURE — 83735 MAGNESIUM: ICD-10-PCS | Mod: ,,, | Performed by: CLINICAL MEDICAL LABORATORY

## 2023-11-27 PROCEDURE — 3008F BODY MASS INDEX DOCD: CPT | Mod: ,,, | Performed by: NURSE PRACTITIONER

## 2023-11-27 PROCEDURE — 84443 THYROID PANEL: ICD-10-PCS | Mod: ,,, | Performed by: CLINICAL MEDICAL LABORATORY

## 2023-11-27 PROCEDURE — 82607 VITAMIN B-12: CPT | Mod: ,,, | Performed by: CLINICAL MEDICAL LABORATORY

## 2023-11-27 PROCEDURE — 80053 COMPREHEN METABOLIC PANEL: CPT | Mod: ,,, | Performed by: CLINICAL MEDICAL LABORATORY

## 2023-11-27 PROCEDURE — 84439 ASSAY OF FREE THYROXINE: CPT | Mod: ,,, | Performed by: CLINICAL MEDICAL LABORATORY

## 2023-11-27 PROCEDURE — 83735 ASSAY OF MAGNESIUM: CPT | Mod: ,,, | Performed by: CLINICAL MEDICAL LABORATORY

## 2023-11-27 PROCEDURE — 3008F PR BODY MASS INDEX (BMI) DOCUMENTED: ICD-10-PCS | Mod: ,,, | Performed by: NURSE PRACTITIONER

## 2023-11-27 PROCEDURE — 3077F SYST BP >= 140 MM HG: CPT | Mod: ,,, | Performed by: NURSE PRACTITIONER

## 2023-11-27 PROCEDURE — 3079F DIAST BP 80-89 MM HG: CPT | Mod: ,,, | Performed by: NURSE PRACTITIONER

## 2023-11-27 PROCEDURE — 82746 VITAMIN B12/FOLATE, SERUM PANEL: ICD-10-PCS | Mod: ,,, | Performed by: CLINICAL MEDICAL LABORATORY

## 2023-11-27 PROCEDURE — 82607 VITAMIN B12/FOLATE, SERUM PANEL: ICD-10-PCS | Mod: ,,, | Performed by: CLINICAL MEDICAL LABORATORY

## 2023-11-27 NOTE — PROGRESS NOTES
"   TU Sanchez   [facility] [address] [place] [address]  [phone]      PATIENT NAME: Judy iMller  : 1950  DATE: 23  MRN: [id number]      Billing Provider: TU Sanchez  Level of Service:   Patient PCP Information       Provider PCP Type    TU Sanchez General            Reason for Visit / Chief Complaint: Extremity Weakness and Arm Pain       Update PCP  Update Chief Complaint         History of Present Illness / Problem Focused Workflow     Judy Miller presents to the clinic with Extremity Weakness and Arm Pain     Patient presents for evaluation of generalized weakness and fatigue.  Patient reports that she has been having some weakness in her legs were her legs just "give out" she reports falling or nearly falling several times over the last couple of weeks.  She denies any dizziness no headaches no blurred vision no loss of consciousness she denies any injury.  She reports that her legs just get weak completely give out following these episodes she has complaints of cramping in her legs and in her hands.    Discussed with patient that this could be related to multiple things we will check routine labs to include magnesium and potassium for possible causes of muscle cramping.  Also discussed with patient this may be related to her lower back and having muscle spasms which could be compressing a nerve which would cause pain and weakness of the lower extremities.  Patient reports that she has been sitting in a new chairs side wheeze while watching television.  Discussed with patient this may actually be putting pressure on her lower back causing muscle spasm and compression on the nerve which causes the weakness in her legs.  Recommended that she continue to use her cane to assist with ambulation to decrease risk of falling also recommended that she not sit sideways in " the chair.  We will check routine labs today and follow up with the patient 1 week as scheduled    I do not have labs available in clinic today for evaluation if patient continues to have symptoms consider possible x-ray versus other evaluation or referrals        Review of Systems     Review of Systems   Constitutional:  Positive for fatigue. Negative for fever.   HENT:  Negative for nasal congestion and sore throat.    Eyes:  Negative for visual disturbance.   Respiratory:  Negative for chest tightness and shortness of breath.    Cardiovascular:  Negative for chest pain and leg swelling.   Gastrointestinal:  Negative for abdominal pain and change in bowel habit.   Endocrine: Negative for polydipsia, polyphagia and polyuria.   Genitourinary:  Negative for dysuria and hematuria.   Musculoskeletal:  Positive for leg pain and myalgias. Negative for back pain.   Integumentary:  Negative for rash.   Neurological:  Positive for weakness and coordination difficulties. Negative for dizziness, syncope and light-headedness.        Medical / Social / Family History     Past Medical History:   Diagnosis Date    Diabetes mellitus, type 2     GERD (gastroesophageal reflux disease)     Hyperlipidemia     Hypertension        Past Surgical History:   Procedure Laterality Date    HIP REPLACEMENT ARTHROPLASTY Left     TUBAL LIGATION         Social History  Ms. Miller  reports that she has never smoked. She has never used smokeless tobacco. She reports that she does not currently use alcohol. She reports that she does not use drugs.    Family History  Ms. Miller's family history includes Diabetes in her mother and sister; Heart disease in her mother; Hypertension in her mother.    Medications and Allergies     Medications  Outpatient Medications Marked as Taking for the 11/27/23 encounter (Office Visit) with Vera Velez ACNP   Medication Sig Dispense Refill    aspirin (ECOTRIN) 81 MG EC tablet Take 81 mg by mouth once daily.       blood glucose control, low (TRUE METRIX LEVEL 1) Soln Inject 1 each into the skin Daily. 1 each 1    blood sugar diagnostic (TRUE METRIX GLUCOSE TEST STRIP) Strp Inject 1 each into the skin Daily. 200 strip 3    blood-glucose meter (TRUE METRIX AIR GLUCOSE METER) Misc Inject 1 each into the skin Daily. 1 each 1    EScitalopram oxalate (LEXAPRO) 10 MG tablet Take 1 tablet (10 mg total) by mouth once daily. 90 tablet 1    fluticasone propionate (FLONASE) 50 mcg/actuation nasal spray 2 sprays (100 mcg total) by Each Nostril route once daily. 16 g 2    glipiZIDE (GLUCOTROL) 2.5 MG TR24 Take 2 tablets (5 mg total) by mouth daily with breakfast. 90 tablet 1    hydroCHLOROthiazide (HYDRODIURIL) 25 MG tablet Take 1 tablet (25 mg total) by mouth once daily. 90 tablet 1    ibuprofen (ADVIL,MOTRIN) 800 MG tablet Take 1 tablet (800 mg total) by mouth every 6 (six) hours as needed for Pain. 60 tablet 2    lancets (TRUEPLUS LANCETS) 33 gauge Misc Inject 1 lancet  into the skin Daily. 200 each 3    lisinopriL 10 MG tablet Take 1 tablet (10 mg total) by mouth once daily. 90 tablet 1    loratadine (CLARITIN) 10 mg tablet Take 1 tablet (10 mg total) by mouth once daily. 90 tablet 1    pantoprazole (PROTONIX) 40 MG tablet Take 1 tablet (40 mg total) by mouth once daily. 90 tablet 1    polyethylene glycol (GLYCOLAX) 17 gram PwPk Take 17 g by mouth once daily. Mixed with 8 oz water, juice, soda, coffee or tea      potassium chloride SA (K-DUR,KLOR-CON M) 10 MEQ tablet Take 1 tablet (10 mEq total) by mouth once daily. With food 90 tablet 1    pravastatin (PRAVACHOL) 20 MG tablet Take 1 tablet (20 mg total) by mouth every evening. 90 tablet 1    verapamiL (CALAN-SR) 240 MG CR tablet Take 1 tablet (240 mg total) by mouth once daily. With food 90 tablet 1       Allergies  Review of patient's allergies indicates:   Allergen Reactions    Pneumococcal vaccine Swelling       Physical Examination     Vitals:    11/27/23 1411   BP: (!) 142/85    Pulse:    Resp:    Temp:      Physical Exam  Eyes:      Pupils: Pupils are equal, round, and reactive to light.   Cardiovascular:      Rate and Rhythm: Normal rate and regular rhythm.      Heart sounds: Normal heart sounds. No murmur heard.  Pulmonary:      Breath sounds: Normal breath sounds. No wheezing, rhonchi or rales.   Abdominal:      General: Bowel sounds are normal.   Musculoskeletal:         General: No swelling.      Cervical back: Normal range of motion and neck supple.   Skin:     General: Skin is warm and dry.   Neurological:      Mental Status: She is alert and oriented to person, place, and time.          Lab Results   Component Value Date    WBC 6.70 08/08/2022    HGB 14.6 08/08/2022    HCT 43.7 08/08/2022    MCV 93.4 08/08/2022     08/08/2022        Sodium   Date Value Ref Range Status   07/27/2023 139 136 - 145 mmol/L Final     Potassium   Date Value Ref Range Status   07/27/2023 3.8 3.5 - 5.1 mmol/L Final     Chloride   Date Value Ref Range Status   07/27/2023 104 98 - 107 mmol/L Final     CO2   Date Value Ref Range Status   07/27/2023 28 21 - 32 mmol/L Final     Glucose   Date Value Ref Range Status   07/27/2023 104 74 - 106 mg/dL Final     BUN   Date Value Ref Range Status   07/27/2023 13 7 - 18 mg/dL Final     Creatinine   Date Value Ref Range Status   07/27/2023 1.01 0.55 - 1.02 mg/dL Final     Calcium   Date Value Ref Range Status   07/27/2023 9.3 8.5 - 10.1 mg/dL Final     Total Protein   Date Value Ref Range Status   07/27/2023 8.6 (H) 6.4 - 8.2 g/dL Final     Albumin   Date Value Ref Range Status   07/27/2023 4.1 3.5 - 5.0 g/dL Final     Bilirubin, Total   Date Value Ref Range Status   07/27/2023 0.5 >0.0 - 1.2 mg/dL Final     Alk Phos   Date Value Ref Range Status   07/27/2023 61 55 - 142 U/L Final     AST   Date Value Ref Range Status   07/27/2023 17 15 - 37 U/L Final     ALT   Date Value Ref Range Status   07/27/2023 21 13 - 56 U/L Final     Anion Gap   Date Value Ref Range  "Status   07/27/2023 11 7 - 16 mmol/L Final     eGFR   Date Value Ref Range Status   07/27/2023 59 (L) >=60 mL/min/1.73m2 Final      Lab Results   Component Value Date    HGBA1C 5.0 07/27/2023      Lab Results   Component Value Date    CHOL 174 07/27/2023    CHOL 177 03/27/2023    CHOL 175 12/19/2022     Lab Results   Component Value Date    HDL 69 (H) 07/27/2023    HDL 76 (H) 03/27/2023    HDL 75 (H) 12/19/2022     Lab Results   Component Value Date    LDLCALC 90 07/27/2023    LDLCALC 92 03/27/2023    LDLCALC 82 12/19/2022     No results found for: "DLDL"  Lab Results   Component Value Date    TRIG 76 07/27/2023    TRIG 44 03/27/2023    TRIG 91 12/19/2022     Lab Results   Component Value Date    CHOLHDL 2.5 07/27/2023    CHOLHDL 2.3 03/27/2023    CHOLHDL 2.3 12/19/2022      No results found for: "TSH", "E4JKBVG", "L6BNJLL", "THYROIDAB", "FREET4"     Assessment and Plan (including Health Maintenance)      Problem List  Smart Sets  Document Outside HM   :    Plan:     1. Weakness  -     Thyroid Panel; Future; Expected date: 11/27/2023  -     Vitamin B12 & Folate; Future; Expected date: 11/27/2023  -     Vitamin D; Future; Expected date: 11/27/2023    2. Fatigue, unspecified type  -     Thyroid Panel; Future; Expected date: 11/27/2023    3. Hypertension, unspecified type  -     Comprehensive Metabolic Panel; Future; Expected date: 11/27/2023    4. Hyperlipidemia, unspecified hyperlipidemia type  -     Lipid Panel; Future; Expected date: 11/27/2023    5. Type 2 diabetes mellitus without complication, without long-term current use of insulin  -     Hemoglobin A1C; Future; Expected date: 11/27/2023    6. Muscle cramping  -     Magnesium; Future; Expected date: 11/27/2023    7. Other abnormalities of gait and mobility  -     Vitamin B12 & Folate; Future; Expected date: 11/27/2023    8. Vitamin D deficiency, unspecified  -     Vitamin D; Future; Expected date: 11/27/2023         There are no Patient Instructions on file " for this visit.     Health Maintenance Due   Topic Date Due    Eye Exam  Never done    RSV Vaccine (Age 60+ and Pregnant patients) (1 - 1-dose 60+ series) Never done    Colorectal Cancer Screening  01/09/2022    Diabetes Urine Screening  08/08/2023    Influenza Vaccine (1) 09/01/2023         Health Maintenance Topics with due status: Not Due       Topic Last Completion Date    Foot Exam 03/27/2023    Lipid Panel 07/27/2023    Hemoglobin A1c 07/27/2023    Mammogram 08/21/2023    DEXA Scan 08/21/2023    Low Dose Statin 11/27/2023       Future Appointments   Date Time Provider Department Center   12/6/2023 10:00 AM Vera Velez ACNP [facility]   8/26/2024 11:30 AM [facility]            Signature:  TU [facility] [address] [place] [address]  [phone]    Date of encounter: 11/27/23

## 2023-11-29 DIAGNOSIS — I10 HYPERTENSION, UNSPECIFIED TYPE: ICD-10-CM

## 2023-11-29 DIAGNOSIS — E11.9 TYPE 2 DIABETES MELLITUS WITHOUT COMPLICATION, WITHOUT LONG-TERM CURRENT USE OF INSULIN: ICD-10-CM

## 2023-11-29 RX ORDER — LISINOPRIL 10 MG/1
10 TABLET ORAL DAILY
Qty: 90 TABLET | Refills: 1 | Status: SHIPPED | OUTPATIENT
Start: 2023-11-29 | End: 2024-01-02 | Stop reason: SDUPTHER

## 2023-11-29 RX ORDER — GLIPIZIDE 2.5 MG/1
5 TABLET, EXTENDED RELEASE ORAL
Qty: 90 TABLET | Refills: 1 | Status: SHIPPED | OUTPATIENT
Start: 2023-11-29 | End: 2024-01-02 | Stop reason: SDUPTHER

## 2023-11-29 RX ORDER — POTASSIUM CHLORIDE 750 MG/1
10 TABLET, EXTENDED RELEASE ORAL DAILY
Qty: 90 TABLET | Refills: 1 | Status: SHIPPED | OUTPATIENT
Start: 2023-11-29 | End: 2024-01-02 | Stop reason: SDUPTHER

## 2023-11-29 NOTE — TELEPHONE ENCOUNTER
----- Message from Nicky Simmons sent at 11/29/2023  8:15 AM CST -----  Patient need a refill on medicine, glipiZIDE (GLUCOTROL) 2.5 MG TR24, lisinopriL 10 MG tablet, potassium chloride SA (K-DUR,KLOR-CON M) 10 MEQ tablet  [facility], [address]   Phone: [phone]

## 2023-12-06 ENCOUNTER — OFFICE VISIT (OUTPATIENT)
Dept: FAMILY MEDICINE | Facility: CLINIC | Age: 73
End: 2023-12-06
Payer: MEDICARE

## 2023-12-06 VITALS
DIASTOLIC BLOOD PRESSURE: 82 MMHG | OXYGEN SATURATION: 97 % | RESPIRATION RATE: 18 BRPM | SYSTOLIC BLOOD PRESSURE: 136 MMHG | HEART RATE: 94 BPM | WEIGHT: 222 LBS | BODY MASS INDEX: 35.68 KG/M2 | TEMPERATURE: 99 F | HEIGHT: 66 IN

## 2023-12-06 DIAGNOSIS — E11.9 TYPE 2 DIABETES MELLITUS WITHOUT COMPLICATION, WITHOUT LONG-TERM CURRENT USE OF INSULIN: ICD-10-CM

## 2023-12-06 DIAGNOSIS — M25.552 LEFT HIP PAIN: ICD-10-CM

## 2023-12-06 DIAGNOSIS — I10 HYPERTENSION, UNSPECIFIED TYPE: ICD-10-CM

## 2023-12-06 DIAGNOSIS — E78.5 HYPERLIPIDEMIA, UNSPECIFIED HYPERLIPIDEMIA TYPE: ICD-10-CM

## 2023-12-06 DIAGNOSIS — Z23 INFLUENZA VACCINATION ADMINISTERED AT CURRENT VISIT: Primary | ICD-10-CM

## 2023-12-06 PROCEDURE — 3008F BODY MASS INDEX DOCD: CPT | Mod: ,,, | Performed by: NURSE PRACTITIONER

## 2023-12-06 PROCEDURE — 90662 FLU VACCINE - HIGH DOSE (65+) PRESERVATIVE FREE IM: ICD-10-PCS | Mod: ,,, | Performed by: NURSE PRACTITIONER

## 2023-12-06 PROCEDURE — 3075F PR MOST RECENT SYSTOLIC BLOOD PRESS GE 130-139MM HG: ICD-10-PCS | Mod: ,,, | Performed by: NURSE PRACTITIONER

## 2023-12-06 PROCEDURE — 3079F PR MOST RECENT DIASTOLIC BLOOD PRESSURE 80-89 MM HG: ICD-10-PCS | Mod: ,,, | Performed by: NURSE PRACTITIONER

## 2023-12-06 PROCEDURE — G0008 ADMIN INFLUENZA VIRUS VAC: HCPCS | Mod: ,,, | Performed by: NURSE PRACTITIONER

## 2023-12-06 PROCEDURE — 99213 OFFICE O/P EST LOW 20 MIN: CPT | Mod: ,,, | Performed by: NURSE PRACTITIONER

## 2023-12-06 PROCEDURE — 99213 PR OFFICE/OUTPT VISIT, EST, LEVL III, 20-29 MIN: ICD-10-PCS | Mod: ,,, | Performed by: NURSE PRACTITIONER

## 2023-12-06 PROCEDURE — 1159F MED LIST DOCD IN RCRD: CPT | Mod: ,,, | Performed by: NURSE PRACTITIONER

## 2023-12-06 PROCEDURE — G0008 FLU VACCINE - HIGH DOSE (65+) PRESERVATIVE FREE IM: ICD-10-PCS | Mod: ,,, | Performed by: NURSE PRACTITIONER

## 2023-12-06 PROCEDURE — 4010F ACE/ARB THERAPY RXD/TAKEN: CPT | Mod: ,,, | Performed by: NURSE PRACTITIONER

## 2023-12-06 PROCEDURE — 3075F SYST BP GE 130 - 139MM HG: CPT | Mod: ,,, | Performed by: NURSE PRACTITIONER

## 2023-12-06 PROCEDURE — 3079F DIAST BP 80-89 MM HG: CPT | Mod: ,,, | Performed by: NURSE PRACTITIONER

## 2023-12-06 PROCEDURE — 3008F PR BODY MASS INDEX (BMI) DOCUMENTED: ICD-10-PCS | Mod: ,,, | Performed by: NURSE PRACTITIONER

## 2023-12-06 PROCEDURE — 1160F RVW MEDS BY RX/DR IN RCRD: CPT | Mod: ,,, | Performed by: NURSE PRACTITIONER

## 2023-12-06 PROCEDURE — 1160F PR REVIEW ALL MEDS BY PRESCRIBER/CLIN PHARMACIST DOCUMENTED: ICD-10-PCS | Mod: ,,, | Performed by: NURSE PRACTITIONER

## 2023-12-06 PROCEDURE — 3044F PR MOST RECENT HEMOGLOBIN A1C LEVEL <7.0%: ICD-10-PCS | Mod: ,,, | Performed by: NURSE PRACTITIONER

## 2023-12-06 PROCEDURE — 4010F PR ACE/ARB THEARPY RXD/TAKEN: ICD-10-PCS | Mod: ,,, | Performed by: NURSE PRACTITIONER

## 2023-12-06 PROCEDURE — 90662 IIV NO PRSV INCREASED AG IM: CPT | Mod: ,,, | Performed by: NURSE PRACTITIONER

## 2023-12-06 PROCEDURE — 3044F HG A1C LEVEL LT 7.0%: CPT | Mod: ,,, | Performed by: NURSE PRACTITIONER

## 2023-12-06 PROCEDURE — 1159F PR MEDICATION LIST DOCUMENTED IN MEDICAL RECORD: ICD-10-PCS | Mod: ,,, | Performed by: NURSE PRACTITIONER

## 2023-12-06 RX ORDER — LORATADINE 10 MG/1
10 TABLET ORAL DAILY
Qty: 90 TABLET | Refills: 1 | Status: SHIPPED | OUTPATIENT
Start: 2023-12-06

## 2023-12-06 RX ORDER — CLOBETASOL PROPIONATE 0.5 MG/G
CREAM TOPICAL 2 TIMES DAILY
Qty: 30 G | Refills: 1 | Status: SHIPPED | OUTPATIENT
Start: 2023-12-06

## 2023-12-06 RX ORDER — HYDROCHLOROTHIAZIDE 25 MG/1
25 TABLET ORAL DAILY
Qty: 90 TABLET | Refills: 1 | Status: SHIPPED | OUTPATIENT
Start: 2023-12-06 | End: 2024-03-26 | Stop reason: SDUPTHER

## 2023-12-06 RX ORDER — PRAVASTATIN SODIUM 20 MG/1
20 TABLET ORAL NIGHTLY
Qty: 90 TABLET | Refills: 1 | Status: SHIPPED | OUTPATIENT
Start: 2023-12-06 | End: 2024-03-26 | Stop reason: SDUPTHER

## 2023-12-06 RX ORDER — VERAPAMIL HYDROCHLORIDE 240 MG/1
240 TABLET, FILM COATED, EXTENDED RELEASE ORAL DAILY
Qty: 90 TABLET | Refills: 1 | Status: SHIPPED | OUTPATIENT
Start: 2023-12-06 | End: 2024-03-26 | Stop reason: SDUPTHER

## 2023-12-06 RX ORDER — PANTOPRAZOLE SODIUM 40 MG/1
40 TABLET, DELAYED RELEASE ORAL DAILY
Qty: 90 TABLET | Refills: 1 | Status: SHIPPED | OUTPATIENT
Start: 2023-12-06 | End: 2024-03-26 | Stop reason: SDUPTHER

## 2023-12-06 NOTE — PROGRESS NOTES
TU Sanchez   [facility] [address] [place] [address]  [phone]      PATIENT NAME: Judy Miller  : 1950  DATE: 23  MRN: [id number]      Billing Provider: TU Sanchez  Level of Service:   Patient PCP Information       Provider PCP Type    TU Sanchez General            Reason for Visit / Chief Complaint: Follow-up, Hypertension, Hyperlipidemia, and Diabetes       Update PCP  Update Chief Complaint         History of Present Illness / Problem Focused Workflow     Judy Miller presents to the clinic with Follow-up, Hypertension, Hyperlipidemia, and Diabetes     Pt presents for routine follow up with lab and med refills. Overall doing well.      BP appears  controlled today. Home meds reviewed  The current medical regimen is effective;  continue present plan and medications. Recommended patient to check home readings to monitor and see me for followup as scheduled or sooner as needed.   Discussed sodium restriction, maintaining ideal body weight and regular exercise program as physiologic means to continue to achieve blood pressure control in addition to medication compliance.  Patient was educated that both decongestant and anti-inflammatory medication may raise blood pressure.  Advised to monitor BP at home. Advised on optimal BP readings - SBP < 130 & DBP < 80. Advised to call office for any persistent BP elevation and may have to prescribe or adjust BP med(s).  Recommended DASH diet, stay well hydrated with water daily, eliminate or decrease caffeinated and high calorie drinks, increase physical activity, and lose weight if BMI > 25.0.    Patient was seen a couple weeks ago for back and leg pain.  At the time the patient was on having issues because she was putting sideways in a chair.  She reports since that time she continues to have hip and leg pain.  Patient reports  she had her hip replaced on the left in 2013 in [place].  She reports since that time her surgeon has retired.  She was concern that there may be some issues with the hip and is requesting referral to Orthopedics for further evaluation.  Patient has difficulty with ambulation due to hip and leg pain.  Recommended that she continue to use her cane to decrease risk of falls.  We will make referral to Orthopedics        Review of Systems     Review of Systems   Constitutional:  Negative for fatigue and fever.   HENT:  Negative for nasal congestion and sore throat.    Eyes:  Negative for visual disturbance.   Respiratory:  Negative for chest tightness and shortness of breath.    Cardiovascular:  Negative for chest pain and leg swelling.   Gastrointestinal:  Negative for abdominal pain and change in bowel habit.   Endocrine: Negative for polydipsia, polyphagia and polyuria.   Genitourinary:  Negative for dysuria and hematuria.   Musculoskeletal:  Positive for back pain, gait problem and leg pain.   Integumentary:  Negative for rash.   Neurological:  Negative for dizziness, syncope, weakness and light-headedness.        Medical / Social / Family History     Past Medical History:   Diagnosis Date    Diabetes mellitus, type 2     GERD (gastroesophageal reflux disease)     Hyperlipidemia     Hypertension        Past Surgical History:   Procedure Laterality Date    HIP REPLACEMENT ARTHROPLASTY Left     TUBAL LIGATION         Social History  Ms. Miller  reports that she has never smoked. She has never used smokeless tobacco. She reports that she does not currently use alcohol. She reports that she does not use drugs.    Family History  Ms. Miller's family history includes Diabetes in her mother and sister; Heart disease in her mother; Hypertension in her mother.    Medications and Allergies     Medications  Outpatient Medications Marked as Taking for the 12/6/23 encounter (Office Visit) with Vera Velez ACNP    Medication Sig Dispense Refill    aspirin (ECOTRIN) 81 MG EC tablet Take 81 mg by mouth once daily.      blood glucose control, low (TRUE METRIX LEVEL 1) Soln Inject 1 each into the skin Daily. 1 each 1    blood sugar diagnostic (TRUE METRIX GLUCOSE TEST STRIP) Strp Inject 1 each into the skin Daily. 200 strip 3    blood-glucose meter (TRUE METRIX AIR GLUCOSE METER) Misc Inject 1 each into the skin Daily. 1 each 1    EScitalopram oxalate (LEXAPRO) 10 MG tablet Take 1 tablet (10 mg total) by mouth once daily. 90 tablet 1    fluticasone propionate (FLONASE) 50 mcg/actuation nasal spray 2 sprays (100 mcg total) by Each Nostril route once daily. 16 g 2    glipiZIDE (GLUCOTROL) 2.5 MG TR24 Take 2 tablets (5 mg total) by mouth daily with breakfast. 90 tablet 1    ibuprofen (ADVIL,MOTRIN) 800 MG tablet Take 1 tablet (800 mg total) by mouth every 6 (six) hours as needed for Pain. 60 tablet 2    lancets (TRUEPLUS LANCETS) 33 gauge Misc Inject 1 lancet  into the skin Daily. 200 each 3    lisinopriL 10 MG tablet Take 1 tablet (10 mg total) by mouth once daily. 90 tablet 1    polyethylene glycol (GLYCOLAX) 17 gram PwPk Take 17 g by mouth once daily. Mixed with 8 oz water, juice, soda, coffee or tea      potassium chloride SA (K-DUR,KLOR-CON M) 10 MEQ tablet Take 1 tablet (10 mEq total) by mouth once daily. With food 90 tablet 1    [DISCONTINUED] hydroCHLOROthiazide (HYDRODIURIL) 25 MG tablet Take 1 tablet (25 mg total) by mouth once daily. 90 tablet 1    [DISCONTINUED] loratadine (CLARITIN) 10 mg tablet Take 1 tablet (10 mg total) by mouth once daily. 90 tablet 1    [DISCONTINUED] pantoprazole (PROTONIX) 40 MG tablet Take 1 tablet (40 mg total) by mouth once daily. 90 tablet 1    [DISCONTINUED] pravastatin (PRAVACHOL) 20 MG tablet Take 1 tablet (20 mg total) by mouth every evening. 90 tablet 1    [DISCONTINUED] verapamiL (CALAN-SR) 240 MG CR tablet Take 1 tablet (240 mg total) by mouth once daily. With food 90 tablet 1        Allergies  Review of patient's allergies indicates:   Allergen Reactions    Pneumococcal vaccine Swelling       Physical Examination     Vitals:    12/06/23 1011   BP: 136/82   Pulse: 94   Resp: 18   Temp: 98.7 °F (37.1 °C)     Physical Exam  Eyes:      Pupils: Pupils are equal, round, and reactive to light.   Cardiovascular:      Rate and Rhythm: Normal rate and regular rhythm.      Heart sounds: Normal heart sounds. No murmur heard.  Pulmonary:      Breath sounds: Normal breath sounds. No wheezing, rhonchi or rales.   Abdominal:      General: Bowel sounds are normal.   Musculoskeletal:         General: No swelling.      Cervical back: Normal range of motion and neck supple.   Skin:     General: Skin is warm and dry.   Neurological:      Mental Status: She is alert and oriented to person, place, and time.          Lab Results   Component Value Date    WBC 6.70 08/08/2022    HGB 14.6 08/08/2022    HCT 43.7 08/08/2022    MCV 93.4 08/08/2022     08/08/2022        Sodium   Date Value Ref Range Status   11/27/2023 138 136 - 145 mmol/L Final     Potassium   Date Value Ref Range Status   11/27/2023 3.7 3.5 - 5.1 mmol/L Final     Chloride   Date Value Ref Range Status   11/27/2023 108 (H) 98 - 107 mmol/L Final     CO2   Date Value Ref Range Status   11/27/2023 25 21 - 32 mmol/L Final     Glucose   Date Value Ref Range Status   11/27/2023 107 (H) 74 - 106 mg/dL Final     BUN   Date Value Ref Range Status   11/27/2023 12 7 - 18 mg/dL Final     Creatinine   Date Value Ref Range Status   11/27/2023 0.97 0.55 - 1.02 mg/dL Final     Calcium   Date Value Ref Range Status   11/27/2023 9.1 8.5 - 10.1 mg/dL Final     Total Protein   Date Value Ref Range Status   11/27/2023 8.4 (H) 6.4 - 8.2 g/dL Final     Albumin   Date Value Ref Range Status   11/27/2023 4.1 3.5 - 5.0 g/dL Final     Bilirubin, Total   Date Value Ref Range Status   11/27/2023 0.5 >0.0 - 1.2 mg/dL Final     Alk Phos   Date Value Ref Range Status  "  11/27/2023 60 55 - 142 U/L Final     AST   Date Value Ref Range Status   11/27/2023 12 (L) 15 - 37 U/L Final     ALT   Date Value Ref Range Status   11/27/2023 22 13 - 56 U/L Final     Anion Gap   Date Value Ref Range Status   11/27/2023 9 7 - 16 mmol/L Final     eGFR   Date Value Ref Range Status   11/27/2023 62 >=60 mL/min/1.73m2 Final      Lab Results   Component Value Date    HGBA1C 5.1 11/27/2023      Lab Results   Component Value Date    CHOL 165 11/27/2023    CHOL 174 07/27/2023    CHOL 177 03/27/2023     Lab Results   Component Value Date    HDL 64 (H) 11/27/2023    HDL 69 (H) 07/27/2023    HDL 76 (H) 03/27/2023     Lab Results   Component Value Date    LDLCALC 85 11/27/2023    LDLCALC 90 07/27/2023    LDLCALC 92 03/27/2023     No results found for: "DLDL"  Lab Results   Component Value Date    TRIG 80 11/27/2023    TRIG 76 07/27/2023    TRIG 44 03/27/2023     Lab Results   Component Value Date    CHOLHDL 2.6 11/27/2023    CHOLHDL 2.5 07/27/2023    CHOLHDL 2.3 03/27/2023      Lab Results   Component Value Date    TSH 1.170 11/27/2023    FREET4 0.90 11/27/2023        Assessment and Plan (including Health Maintenance)      Problem List  Smart Sets  Document Outside HM   :    Plan:     1. Influenza vaccination administered at current visit  -     Influenza - High Dose (65+) (PF) (IM)    2. Hypertension, unspecified type  Assessment & Plan:  BP Readings from Last 3 Encounters:   12/06/23 136/82   11/27/23 (!) 142/85   07/27/23 (!) 148/82    The current medical regimen is effective;  continue present plan and medications.      Orders:  -     Cancel: CBC Auto Differential; Future; Expected date: 12/06/2023  -     Cancel: Comprehensive Metabolic Panel; Future; Expected date: 12/06/2023  -     hydroCHLOROthiazide (HYDRODIURIL) 25 MG tablet; Take 1 tablet (25 mg total) by mouth once daily.  Dispense: 90 tablet; Refill: 1  -     verapamiL (CALAN-SR) 240 MG CR tablet; Take 1 tablet (240 mg total) by mouth once daily. " With food  Dispense: 90 tablet; Refill: 1    3. Hyperlipidemia, unspecified hyperlipidemia type  Assessment & Plan:  Lab Results   Component Value Date    CHOL 165 11/27/2023    CHOL 174 07/27/2023    CHOL 177 03/27/2023     Lab Results   Component Value Date    HDL 64 (H) 11/27/2023    HDL 69 (H) 07/27/2023    HDL 76 (H) 03/27/2023     Lab Results   Component Value Date    LDLCALC 85 11/27/2023    LDLCALC 90 07/27/2023    LDLCALC 92 03/27/2023     Lab Results   Component Value Date    TRIG 80 11/27/2023    TRIG 76 07/27/2023    TRIG 44 03/27/2023       Lab Results   Component Value Date    CHOLHDL 2.6 11/27/2023    CHOLHDL 2.5 07/27/2023    CHOLHDL 2.3 03/27/2023    The current medical regimen is effective;  continue present plan and medications.      Orders:  -     Cancel: Lipid Panel; Future; Expected date: 12/06/2023  -     pravastatin (PRAVACHOL) 20 MG tablet; Take 1 tablet (20 mg total) by mouth every evening.  Dispense: 90 tablet; Refill: 1    4. Type 2 diabetes mellitus without complication, without long-term current use of insulin  Assessment & Plan:  Lab Results   Component Value Date    HGBA1C 5.1 11/27/2023    HGBA1C 5.0 07/27/2023    HGBA1C 5.0 03/27/2023     Lab Results   Component Value Date    MICROALBUR 0.7 08/08/2022    LDLCALC 85 11/27/2023    CREATININE 0.97 11/27/2023    The current medical regimen is effective;  continue present plan and medications.      Orders:  -     Cancel: Hemoglobin A1C; Future; Expected date: 12/06/2023  -     Cancel: Microalbumin/Creatinine Ratio, Urine; Future; Expected date: 12/06/2023    5. Left hip pain  Assessment & Plan:  Hx of hip replacement in 2012. Since then her doctor has retired.   Continued hip and leg pain, now using cane  Refer to ortho for evaluation       Orders:  -     Ambulatory referral/consult to Orthopedics; Future; Expected date: 12/13/2023    Other orders  -     loratadine (CLARITIN) 10 mg tablet; Take 1 tablet (10 mg total) by mouth once daily.   Dispense: 90 tablet; Refill: 1  -     pantoprazole (PROTONIX) 40 MG tablet; Take 1 tablet (40 mg total) by mouth once daily.  Dispense: 90 tablet; Refill: 1  -     clobetasoL (TEMOVATE) 0.05 % cream; Apply topically 2 (two) times daily. Apply a thin layer to the affected area(s)  Dispense: 30 g; Refill: 1         There are no Patient Instructions on file for this visit.     Health Maintenance Due   Topic Date Due    Diabetes Urine Screening  08/08/2023         Health Maintenance Topics with due status: Not Due       Topic Last Completion Date    Foot Exam 03/27/2023    Mammogram 08/21/2023    DEXA Scan 08/21/2023    Lipid Panel 11/27/2023    Hemoglobin A1c 11/27/2023    Low Dose Statin 12/06/2023       Future Appointments   Date Time Provider Department Center   1/16/2024 10:00 AM AWADRIANA NURSE, GI[facility] [place]   3/14/2024 10:20 AM Vera Velez ACNP [facility] [place]   8/26/2024 11:30 AM [facility]            Signature:  [facility]  [address] [place] [address] [place] [address]  [phone]    Date of encounter: 12/6/23

## 2023-12-06 NOTE — ASSESSMENT & PLAN NOTE
Hx of hip replacement in 2012. Since then her doctor has retired.   Continued hip and leg pain, now using cane  Refer to ortho for evaluation

## 2023-12-06 NOTE — ASSESSMENT & PLAN NOTE
Lab Results   Component Value Date    HGBA1C 5.1 11/27/2023    HGBA1C 5.0 07/27/2023    HGBA1C 5.0 03/27/2023     Lab Results   Component Value Date    MICROALBUR 0.7 08/08/2022    LDLCALC 85 11/27/2023    CREATININE 0.97 11/27/2023    The current medical regimen is effective;  continue present plan and medications.

## 2023-12-06 NOTE — ASSESSMENT & PLAN NOTE
Lab Results   Component Value Date    CHOL 165 11/27/2023    CHOL 174 07/27/2023    CHOL 177 03/27/2023     Lab Results   Component Value Date    HDL 64 (H) 11/27/2023    HDL 69 (H) 07/27/2023    HDL 76 (H) 03/27/2023     Lab Results   Component Value Date    LDLCALC 85 11/27/2023    LDLCALC 90 07/27/2023    LDLCALC 92 03/27/2023     Lab Results   Component Value Date    TRIG 80 11/27/2023    TRIG 76 07/27/2023    TRIG 44 03/27/2023       Lab Results   Component Value Date    CHOLHDL 2.6 11/27/2023    CHOLHDL 2.5 07/27/2023    CHOLHDL 2.3 03/27/2023    The current medical regimen is effective;  continue present plan and medications.

## 2023-12-06 NOTE — ASSESSMENT & PLAN NOTE
BP Readings from Last 3 Encounters:   12/06/23 136/82   11/27/23 (!) 142/85   07/27/23 (!) 148/82    The current medical regimen is effective;  continue present plan and medications.

## 2024-01-01 RX ORDER — HYDROCODONE BITARTRATE AND ACETAMINOPHEN 7.5; 325 MG/1; MG/1
1 TABLET ORAL EVERY 6 HOURS PRN
Qty: 15 TABLET | Refills: 0 | Status: SHIPPED | OUTPATIENT
Start: 2024-01-01

## 2024-01-02 DIAGNOSIS — E11.9 TYPE 2 DIABETES MELLITUS WITHOUT COMPLICATION, WITHOUT LONG-TERM CURRENT USE OF INSULIN: ICD-10-CM

## 2024-01-02 DIAGNOSIS — I10 HYPERTENSION, UNSPECIFIED TYPE: ICD-10-CM

## 2024-01-02 RX ORDER — POTASSIUM CHLORIDE 750 MG/1
10 TABLET, EXTENDED RELEASE ORAL DAILY
Qty: 90 TABLET | Refills: 1 | Status: SHIPPED | OUTPATIENT
Start: 2024-01-02

## 2024-01-02 RX ORDER — LISINOPRIL 10 MG/1
10 TABLET ORAL DAILY
Qty: 90 TABLET | Refills: 1 | Status: SHIPPED | OUTPATIENT
Start: 2024-01-02

## 2024-01-02 RX ORDER — LISINOPRIL 10 MG/1
10 TABLET ORAL DAILY
Qty: 90 TABLET | Refills: 1 | OUTPATIENT
Start: 2024-01-02

## 2024-01-02 RX ORDER — GLIPIZIDE 2.5 MG/1
5 TABLET, EXTENDED RELEASE ORAL
Qty: 90 TABLET | Refills: 1 | OUTPATIENT
Start: 2024-01-02

## 2024-01-02 RX ORDER — POTASSIUM CHLORIDE 750 MG/1
10 TABLET, EXTENDED RELEASE ORAL DAILY
Qty: 90 TABLET | Refills: 1 | OUTPATIENT
Start: 2024-01-02

## 2024-01-02 RX ORDER — GLIPIZIDE 2.5 MG/1
5 TABLET, EXTENDED RELEASE ORAL
Qty: 90 TABLET | Refills: 1 | Status: SHIPPED | OUTPATIENT
Start: 2024-01-02

## 2024-01-02 NOTE — TELEPHONE ENCOUNTER
----- Message from Estephania Ward sent at 12/29/2023  9:53 AM CST -----  Pt calling to get refills on lisinopriL 10 MG tablet, glipiZIDE (GLUCOTROL) 2.5, and potassium chloride SA (K-DUR,KLOR-CON M) 10 MEQ [facility] [phone]

## 2024-01-02 NOTE — TELEPHONE ENCOUNTER
----- Message from Juanita Zuniga sent at 1/2/2024  8:36 AM CST -----  Regarding: RETURN CALL  PATIENT CALL AND WOULD LIKE A CALL BACK CONCERNING HER MEDICATION, CALL BACK NUMBER [phone]

## 2024-01-02 NOTE — TELEPHONE ENCOUNTER
----- Message from Juanita Zuniga sent at 1/2/2024  8:36 AM CST -----  Regarding: RETURN CALL  PATIENT CALL AND WOULD LIKE A CALL BACK CONCERNING HER MEDICATION, CALL BACK NUMBER [phone]

## 2024-01-05 DIAGNOSIS — Z96.642 HISTORY OF TOTAL HIP ARTHROPLASTY, LEFT: Primary | ICD-10-CM

## 2024-01-05 DIAGNOSIS — M25.552 HIP PAIN, LEFT: ICD-10-CM

## 2024-01-09 DIAGNOSIS — Z71.89 COMPLEX CARE COORDINATION: ICD-10-CM

## 2024-01-11 ENCOUNTER — OFFICE VISIT (OUTPATIENT)
Dept: ORTHOPEDICS | Facility: CLINIC | Age: 74
End: 2024-01-11
Payer: MEDICARE

## 2024-01-11 ENCOUNTER — HOSPITAL ENCOUNTER (OUTPATIENT)
Dept: RADIOLOGY | Facility: HOSPITAL | Age: 74
Discharge: HOME | End: 2024-01-11
Attending: ORTHOPAEDIC SURGERY
Payer: MEDICARE

## 2024-01-11 VITALS — BODY MASS INDEX: 35.68 KG/M2 | HEIGHT: 66 IN | WEIGHT: 222 LBS

## 2024-01-11 DIAGNOSIS — Z96.642 HISTORY OF TOTAL HIP ARTHROPLASTY, LEFT: ICD-10-CM

## 2024-01-11 DIAGNOSIS — M25.552 LEFT HIP PAIN: ICD-10-CM

## 2024-01-11 DIAGNOSIS — M25.552 HIP PAIN, LEFT: ICD-10-CM

## 2024-01-11 PROCEDURE — 99203 OFFICE O/P NEW LOW 30 MIN: CPT | Mod: S$PBB,,, | Performed by: ORTHOPAEDIC SURGERY

## 2024-01-11 PROCEDURE — 1159F MED LIST DOCD IN RCRD: CPT | Mod: CPTII,,, | Performed by: ORTHOPAEDIC SURGERY

## 2024-01-11 PROCEDURE — 73502 X-RAY EXAM HIP UNI 2-3 VIEWS: CPT | Mod: TC,LT

## 2024-01-11 PROCEDURE — 99214 OFFICE O/P EST MOD 30 MIN: CPT | Mod: PBBFAC | Performed by: ORTHOPAEDIC SURGERY

## 2024-01-11 PROCEDURE — 73502 X-RAY EXAM HIP UNI 2-3 VIEWS: CPT | Mod: 26,LT,, | Performed by: ORTHOPAEDIC SURGERY

## 2024-01-11 PROCEDURE — 3008F BODY MASS INDEX DOCD: CPT | Mod: CPTII,,, | Performed by: ORTHOPAEDIC SURGERY

## 2024-01-11 PROCEDURE — 4010F ACE/ARB THERAPY RXD/TAKEN: CPT | Mod: CPTII,,, | Performed by: ORTHOPAEDIC SURGERY

## 2024-01-11 NOTE — PROGRESS NOTES
ASSESSMENT:      ICD-10-CM ICD-9-CM   1. Left hip pain  M25.552 719.45       PLAN:     -Findings and treatment options were discussed with the patient  -All questions answered  Natural history and expected course discussed. Questions answered.  Educational materials distributed.    Does have a history of diabetes some of her numbness and tingling and nerve based pain could be diabetic neuropathy in nature.  She denies having any back pain despite the presence of degenerative changes in her lumbar spine.  May may benefit from nerve based medicines such as Lyrica or gabapentin but will defer to her primary care provider Vera Hoffman (excellent NP)   There are no Patient Instructions on file for this visit.    IMAGING:  X-Ray Hip 2 or 3 views Left (with Pelvis when performed)    Result Date: 1/11/2024  See Procedure Notes for results. IMPRESSION: Please see Ortho procedure notes for report.  This procedure was auto-finalized by: Virtual Radiologist   Three views left hip were obtained today demonstrating a well fixated femoral stem.  The cup appears to be in slightly vertical position but appears to be intact.  I see no discernible signs of loosening within the left hip prosthesis.  Lumbar spine demonstrates severe degenerative changes present throughout L3-L4 and L5        CC: Hip pain  73 y.o. Female who presents as a new patient to me for evaluation of left hip pain.  States she had a replacement  12 years ago and has done well. About 3 weeks she started having pain. She have been having tingling down both lower legs and right forearm.  Upon further questioning in regards to her left hip pain she states that the hip does not hurt but it is really this tingling sensation that she is experiencing ongoing throughout her right and left lower extremities which is the main concern.  She denies having any significant back pain.  Pain has been present for 3 weeks.  Mechanism of injury:   Location of the pain:    Occupation: Retired  Mechanical symptoms, such as catching and popping of the hip are not present.    Symptoms are worsened with activity.  Better with rest. Treatment thus far has included rest, activity modifications, and oral medications.    she has not  had formal physical therapy  she has not had previous advanced imaging such as MRI.   she has  not  previous hip injections.   she has  had previous hip or back surgery.   Here today to discuss diagnosis and treatment options.        Review of Systems  All other review of symptoms were reviewed and found to be noncontributory.     PAST MEDICAL HISTORY:   Past Medical History:   Diagnosis Date    Diabetes mellitus, type 2     GERD (gastroesophageal reflux disease)     Hyperlipidemia     Hypertension        PAST SURGICAL HISTORY:   Past Surgical History:   Procedure Laterality Date    HIP REPLACEMENT ARTHROPLASTY Left     TUBAL LIGATION         FAMILY HISTORY:   Family History   Problem Relation Age of Onset    Hypertension Mother     Heart disease Mother     Diabetes Mother     Diabetes Sister        SOCIAL HISTORY:   Social History     Socioeconomic History    Marital status:    Tobacco Use    Smoking status: Never    Smokeless tobacco: Never   Substance and Sexual Activity    Alcohol use: Not Currently     Comment: Occasional use    Drug use: Never    Sexual activity: Not Currently       MEDICATIONS:     Current Outpatient Medications:     aspirin (ECOTRIN) 81 MG EC tablet, Take 81 mg by mouth once daily., Disp: , Rfl:     blood glucose control, low (TRUE METRIX LEVEL 1) Soln, Inject 1 each into the skin Daily., Disp: 1 each, Rfl: 1    blood sugar diagnostic (TRUE METRIX GLUCOSE TEST STRIP) Strp, Inject 1 each into the skin Daily., Disp: 200 strip, Rfl: 3    blood-glucose meter (TRUE METRIX AIR GLUCOSE METER) Misc, Inject 1 each into the skin Daily., Disp: 1 each, Rfl: 1    clobetasoL (TEMOVATE) 0.05 % cream, Apply topically 2 (two) times daily. Apply a  thin layer to the affected area(s), Disp: 30 g, Rfl: 1    EScitalopram oxalate (LEXAPRO) 10 MG tablet, Take 1 tablet (10 mg total) by mouth once daily., Disp: 90 tablet, Rfl: 1    fluticasone propionate (FLONASE) 50 mcg/actuation nasal spray, 2 sprays (100 mcg total) by Each Nostril route once daily., Disp: 16 g, Rfl: 2    glipiZIDE (GLUCOTROL) 2.5 MG TR24, Take 2 tablets (5 mg total) by mouth daily with breakfast., Disp: 90 tablet, Rfl: 1    hydroCHLOROthiazide (HYDRODIURIL) 25 MG tablet, Take 1 tablet (25 mg total) by mouth once daily., Disp: 90 tablet, Rfl: 1    ibuprofen (ADVIL,MOTRIN) 800 MG tablet, Take 1 tablet (800 mg total) by mouth every 6 (six) hours as needed for Pain., Disp: 60 tablet, Rfl: 2    lancets (TRUEPLUS LANCETS) 33 gauge Misc, Inject 1 lancet  into the skin Daily., Disp: 200 each, Rfl: 3    lisinopriL 10 MG tablet, Take 1 tablet (10 mg total) by mouth once daily., Disp: 90 tablet, Rfl: 1    loratadine (CLARITIN) 10 mg tablet, Take 1 tablet (10 mg total) by mouth once daily., Disp: 90 tablet, Rfl: 1    pantoprazole (PROTONIX) 40 MG tablet, Take 1 tablet (40 mg total) by mouth once daily., Disp: 90 tablet, Rfl: 1    polyethylene glycol (GLYCOLAX) 17 gram PwPk, Take 17 g by mouth once daily. Mixed with 8 oz water, juice, soda, coffee or tea, Disp: , Rfl:     potassium chloride SA (K-DUR,KLOR-CON M) 10 MEQ tablet, Take 1 tablet (10 mEq total) by mouth once daily. With food, Disp: 90 tablet, Rfl: 1    pravastatin (PRAVACHOL) 20 MG tablet, Take 1 tablet (20 mg total) by mouth every evening., Disp: 90 tablet, Rfl: 1    verapamiL (CALAN-SR) 240 MG CR tablet, Take 1 tablet (240 mg total) by mouth once daily. With food, Disp: 90 tablet, Rfl: 1    ondansetron (ZOFRAN-ODT) 4 MG TbDL, Take 1 tablet (4 mg total) by mouth every 8 (eight) hours as needed (for nausea/vomiting). (Patient not taking: Reported on 3/27/2023), Disp: 10 tablet, Rfl: 0    ALLERGIES:   Review of patient's allergies indicates:  "  Allergen Reactions    Pneumococcal vaccine Swelling        PHYSICAL EXAMINATION:  Ht 5' 6" (1.676 m)   Wt 100.7 kg (222 lb)   BMI 35.83 kg/m²   Ortho/SPM Exam  Left hip demonstrates no tenderness to palpation along the greater trochanter.  She has excellent range of motion of the left hip no blocks to motion seen no pain with FADIR and SHANNAN maneuver.  Able perform a straight leg raise with no pain.  Neurovascularly intact in the right lower extremities bilaterally.  No functional deficits noted with her gait.  Leg lengths appeared to be equal and symmetric.    No orders of the defined types were placed in this encounter.    Procedures      "

## 2024-01-30 ENCOUNTER — OFFICE VISIT (OUTPATIENT)
Dept: FAMILY MEDICINE | Facility: CLINIC | Age: 74
End: 2024-01-30
Payer: MEDICARE

## 2024-01-30 VITALS
SYSTOLIC BLOOD PRESSURE: 129 MMHG | DIASTOLIC BLOOD PRESSURE: 79 MMHG | BODY MASS INDEX: 35.68 KG/M2 | RESPIRATION RATE: 19 BRPM | WEIGHT: 222 LBS | HEIGHT: 66 IN | HEART RATE: 84 BPM | OXYGEN SATURATION: 97 %

## 2024-01-30 DIAGNOSIS — Z00.00 ENCOUNTER FOR SUBSEQUENT ANNUAL WELLNESS VISIT (AWV) IN MEDICARE PATIENT: Primary | ICD-10-CM

## 2024-01-30 DIAGNOSIS — E11.9 TYPE 2 DIABETES MELLITUS WITHOUT COMPLICATION, WITHOUT LONG-TERM CURRENT USE OF INSULIN: ICD-10-CM

## 2024-01-30 DIAGNOSIS — E78.5 HYPERLIPIDEMIA, UNSPECIFIED HYPERLIPIDEMIA TYPE: ICD-10-CM

## 2024-01-30 DIAGNOSIS — I10 HYPERTENSION, UNSPECIFIED TYPE: ICD-10-CM

## 2024-01-30 PROCEDURE — 3074F SYST BP LT 130 MM HG: CPT | Mod: ,,,

## 2024-01-30 PROCEDURE — 1159F MED LIST DOCD IN RCRD: CPT | Mod: ,,,

## 2024-01-30 PROCEDURE — G0439 PPPS, SUBSEQ VISIT: HCPCS | Mod: ,,,

## 2024-01-30 PROCEDURE — 1125F AMNT PAIN NOTED PAIN PRSNT: CPT | Mod: ,,,

## 2024-01-30 PROCEDURE — 3078F DIAST BP <80 MM HG: CPT | Mod: ,,,

## 2024-01-30 PROCEDURE — 4010F ACE/ARB THERAPY RXD/TAKEN: CPT | Mod: ,,,

## 2024-01-30 PROCEDURE — 1101F PT FALLS ASSESS-DOCD LE1/YR: CPT | Mod: ,,,

## 2024-01-30 PROCEDURE — 3288F FALL RISK ASSESSMENT DOCD: CPT | Mod: ,,,

## 2024-01-30 PROCEDURE — 1160F RVW MEDS BY RX/DR IN RCRD: CPT | Mod: ,,,

## 2024-01-30 NOTE — PATIENT INSTRUCTIONS
Counseling and Referral of Other Preventative  (Italic type indicates deductible and co-insurance are waived)    Patient Name: Judy Miller  Today's Date: 1/30/2024    Health Maintenance       Date Due Completion Date    Eye Exam Never done ---    Shingles Vaccine (1 of 2) Never done ---    Colorectal Cancer Screening 01/09/2022 1/9/2017    Diabetes Urine Screening 08/08/2023 8/8/2022    Foot Exam 03/27/2024 3/27/2023    TETANUS VACCINE 03/27/2024 (Originally 1/4/2023) 1/4/2013    RSV Vaccine (Age 60+ and Pregnant patients) (1 - 1-dose 60+ series) 12/06/2024 (Originally 2/3/2011) ---    Hemoglobin A1c 05/27/2024 11/27/2023    Mammogram 08/21/2024 8/21/2023    Lipid Panel 11/27/2024 11/27/2023    Low Dose Statin 01/11/2025 1/11/2024    DEXA Scan 08/21/2028 8/21/2023        No orders of the defined types were placed in this encounter.    The following information is provided to all patients.  This information is to help you find resources for any of the problems found today that may be affecting your health:                  Living healthy guide: [facility] Store[facility].gov    Understanding Diabetes: www.diabetes.org      Eating healthy: www.cdc.gov/healthyweight      CDC home safety checklist: www.cdc.gov/steadi/patient.html      Agency on Aging: [facility].org    Alcoholics anonymous (AA): www.aa.org      Physical Activity: www.key.nih.gov/cg8wsgt      Tobacco use: [employer] Watchdogealth.gov

## 2024-01-30 NOTE — PROGRESS NOTES
"   [facility]       PATIENT NAME: Judy Miller   : 1950    AGE: 73 y.o. DATE: 2024   MRN: [id number]      Judy Miller presented for a  Medicare AWV and comprehensive Health Risk Assessment today. The following components were reviewed and updated:    Medical history  Family History  Social history  Allergies and Current Medications  Health Risk Assessment  Health Maintenance  Care Team         ** See Completed Assessments for Annual Wellness Visit within the encounter summary.**         The following assessments were completed:  Living Situation  CAGE  Depression Screening  Timed Get Up and Go  Whisper Test  Cognitive Function Screening  Nutrition Screening  ADL Screening  PAQ Screening        Vitals:    24 1507   BP: 129/79   BP Location: Left arm   Patient Position: Sitting   BP Method: Large (Automatic)   Pulse: 84   Resp: 19   SpO2: 97%   Weight: 100.7 kg (222 lb)   Height: 5' 6" (1.676 m)     Body mass index is 35.83 kg/m².  Physical Exam  Vitals reviewed.   Constitutional:       Appearance: Normal appearance. She is obese.   HENT:      Right Ear: Tympanic membrane normal.      Left Ear: Tympanic membrane normal.      Nose: Nose normal.      Mouth/Throat:      Mouth: Mucous membranes are moist.      Pharynx: Oropharynx is clear.   Eyes:      Extraocular Movements: Extraocular movements intact.      Conjunctiva/sclera: Conjunctivae normal.      Pupils: Pupils are equal, round, and reactive to light.   Cardiovascular:      Rate and Rhythm: Normal rate and regular rhythm.      Pulses: Normal pulses.      Heart sounds: No murmur heard.  Pulmonary:      Effort: Pulmonary effort is normal.      Breath sounds: Normal breath sounds.   Abdominal:      General: Abdomen is flat. Bowel sounds are normal.      Palpations: Abdomen is soft.   Musculoskeletal:         General: Normal range of motion.      Cervical back: Normal range of motion " and neck supple.   Skin:     General: Skin is warm and dry.      Capillary Refill: Capillary refill takes less than 2 seconds.   Neurological:      General: No focal deficit present.      Mental Status: She is alert and oriented to person, place, and time.   Psychiatric:         Mood and Affect: Mood normal.         Behavior: Behavior normal.              Diagnoses and health risks identified today and associated recommendations/orders:    1. Encounter for subsequent annual wellness visit (AWV) in Medicare patient  Gave appt reminder for next year's AWV    2. Type 2 diabetes mellitus without complication, without long-term current use of insulin  Diabetes managed with medication by PCP.     3. Hypertension, unspecified type  BP well controlled on current medications.    4. Hyperlipidemia, unspecified hyperlipidemia type  Hyperlipidemia managed by PCP with medication.    5. BMI 35.0-35.9,adult  Encouraged diet & exercise to decrease weight/BMI.    Encouraged Shingles/TDAP vaccines at pharmacy    Provided Judy with a 5-10 year written screening schedule and personal prevention plan. Recommendations were developed using the USPSTF age appropriate recommendations. Education, counseling, and referrals were provided as needed. After Visit Summary printed and given to patient which includes a list of additional screenings\tests needed.    Follow up in about 1 year (around 1/30/2025) for AWV follow-up.    Nedra Garsia RN    I offered to discuss advanced care planning, including how to pick a person who would make decisions for you if you were unable to make them for yourself, called a health care power of , and what kind of decisions you might make such as use of life sustaining treatments such as ventilators and tube feeding when faced with a life limiting illness recorded on a living will that they will need to know. (How you want to be cared for as you near the end of your natural life)     X Patient is  interested in learning more about how to make advanced directives.  I provided them paperwork and offered to discuss this with them.    Signature: Anuradha Luis

## 2024-02-01 PROBLEM — Z00.00 ENCOUNTER FOR SUBSEQUENT ANNUAL WELLNESS VISIT (AWV) IN MEDICARE PATIENT: Status: ACTIVE | Noted: 2024-02-01

## 2024-03-14 ENCOUNTER — OFFICE VISIT (OUTPATIENT)
Dept: FAMILY MEDICINE | Facility: CLINIC | Age: 74
End: 2024-03-14
Payer: MEDICARE

## 2024-03-14 VITALS
HEART RATE: 101 BPM | OXYGEN SATURATION: 96 % | BODY MASS INDEX: 35.84 KG/M2 | HEIGHT: 66 IN | WEIGHT: 223 LBS | RESPIRATION RATE: 16 BRPM | TEMPERATURE: 99 F | DIASTOLIC BLOOD PRESSURE: 85 MMHG | SYSTOLIC BLOOD PRESSURE: 130 MMHG

## 2024-03-14 DIAGNOSIS — E66.01 MORBID (SEVERE) OBESITY DUE TO EXCESS CALORIES: ICD-10-CM

## 2024-03-14 DIAGNOSIS — N18.31 CHRONIC KIDNEY DISEASE, STAGE 3A: ICD-10-CM

## 2024-03-14 DIAGNOSIS — E78.5 HYPERLIPIDEMIA, UNSPECIFIED HYPERLIPIDEMIA TYPE: ICD-10-CM

## 2024-03-14 DIAGNOSIS — I10 HYPERTENSION, UNSPECIFIED TYPE: Primary | ICD-10-CM

## 2024-03-14 DIAGNOSIS — F32.5 MAJOR DEPRESSIVE DISORDER, SINGLE EPISODE, IN FULL REMISSION: ICD-10-CM

## 2024-03-14 DIAGNOSIS — E11.620 TYPE 2 DIABETES MELLITUS WITH DIABETIC DERMATITIS, WITHOUT LONG-TERM CURRENT USE OF INSULIN: ICD-10-CM

## 2024-03-14 LAB
ALBUMIN SERPL BCP-MCNC: 4.1 G/DL (ref 3.5–5)
ALBUMIN/GLOB SERPL: 1 {RATIO}
ALP SERPL-CCNC: 69 U/L (ref 55–142)
ALT SERPL W P-5'-P-CCNC: 25 U/L (ref 13–56)
ANION GAP SERPL CALCULATED.3IONS-SCNC: 8 MMOL/L (ref 7–16)
AST SERPL W P-5'-P-CCNC: 15 U/L (ref 15–37)
BASOPHILS # BLD AUTO: 0.06 K/UL (ref 0–0.2)
BASOPHILS NFR BLD AUTO: 0.9 % (ref 0–1)
BILIRUB SERPL-MCNC: 0.5 MG/DL (ref ?–1.2)
BUN SERPL-MCNC: 15 MG/DL (ref 7–18)
BUN/CREAT SERPL: 13 (ref 6–20)
CALCIUM SERPL-MCNC: 9.5 MG/DL (ref 8.5–10.1)
CHLORIDE SERPL-SCNC: 109 MMOL/L (ref 98–107)
CHOLEST SERPL-MCNC: 152 MG/DL (ref 0–200)
CHOLEST/HDLC SERPL: 2.4 {RATIO}
CO2 SERPL-SCNC: 28 MMOL/L (ref 21–32)
CREAT SERPL-MCNC: 1.15 MG/DL (ref 0.55–1.02)
DIFFERENTIAL METHOD BLD: ABNORMAL
EGFR (NO RACE VARIABLE) (RUSH/TITUS): 50 ML/MIN/1.73M2
EOSINOPHIL # BLD AUTO: 0.03 K/UL (ref 0–0.5)
EOSINOPHIL NFR BLD AUTO: 0.5 % (ref 1–4)
ERYTHROCYTE [DISTWIDTH] IN BLOOD BY AUTOMATED COUNT: 11.9 % (ref 11.5–14.5)
GLOBULIN SER-MCNC: 4.1 G/DL (ref 2–4)
GLUCOSE SERPL-MCNC: 109 MG/DL (ref 74–106)
HCT VFR BLD AUTO: 41.4 % (ref 38–47)
HDLC SERPL-MCNC: 63 MG/DL (ref 40–60)
HGB BLD-MCNC: 13 G/DL (ref 12–16)
IMM GRANULOCYTES # BLD AUTO: 0.02 K/UL (ref 0–0.04)
IMM GRANULOCYTES NFR BLD: 0.3 % (ref 0–0.4)
LDLC SERPL CALC-MCNC: 77 MG/DL
LDLC/HDLC SERPL: 1.2 {RATIO}
LYMPHOCYTES # BLD AUTO: 1.67 K/UL (ref 1–4.8)
LYMPHOCYTES NFR BLD AUTO: 25.9 % (ref 27–41)
MCH RBC QN AUTO: 31 PG (ref 27–31)
MCHC RBC AUTO-ENTMCNC: 31.4 G/DL (ref 32–36)
MCV RBC AUTO: 98.6 FL (ref 80–96)
MONOCYTES # BLD AUTO: 0.31 K/UL (ref 0–0.8)
MONOCYTES NFR BLD AUTO: 4.8 % (ref 2–6)
MPC BLD CALC-MCNC: 11 FL (ref 9.4–12.4)
NEUTROPHILS # BLD AUTO: 4.35 K/UL (ref 1.8–7.7)
NEUTROPHILS NFR BLD AUTO: 67.6 % (ref 53–65)
NONHDLC SERPL-MCNC: 89 MG/DL
NRBC # BLD AUTO: 0 X10E3/UL
NRBC, AUTO (.00): 0 %
PLATELET # BLD AUTO: 289 K/UL (ref 150–400)
POTASSIUM SERPL-SCNC: 3.8 MMOL/L (ref 3.5–5.1)
PROT SERPL-MCNC: 8.2 G/DL (ref 6.4–8.2)
RBC # BLD AUTO: 4.2 M/UL (ref 4.2–5.4)
SODIUM SERPL-SCNC: 141 MMOL/L (ref 136–145)
TRIGL SERPL-MCNC: 59 MG/DL (ref 35–150)
VLDLC SERPL-MCNC: 12 MG/DL
WBC # BLD AUTO: 6.44 K/UL (ref 4.5–11)

## 2024-03-14 PROCEDURE — 3075F SYST BP GE 130 - 139MM HG: CPT | Mod: ,,, | Performed by: NURSE PRACTITIONER

## 2024-03-14 PROCEDURE — 82570 ASSAY OF URINE CREATININE: CPT | Mod: ,,, | Performed by: CLINICAL MEDICAL LABORATORY

## 2024-03-14 PROCEDURE — 80061 LIPID PANEL: CPT | Mod: ,,, | Performed by: CLINICAL MEDICAL LABORATORY

## 2024-03-14 PROCEDURE — 1101F PT FALLS ASSESS-DOCD LE1/YR: CPT | Mod: ,,, | Performed by: NURSE PRACTITIONER

## 2024-03-14 PROCEDURE — 3288F FALL RISK ASSESSMENT DOCD: CPT | Mod: ,,, | Performed by: NURSE PRACTITIONER

## 2024-03-14 PROCEDURE — 3079F DIAST BP 80-89 MM HG: CPT | Mod: ,,, | Performed by: NURSE PRACTITIONER

## 2024-03-14 PROCEDURE — 85025 COMPLETE CBC W/AUTO DIFF WBC: CPT | Mod: ,,, | Performed by: CLINICAL MEDICAL LABORATORY

## 2024-03-14 PROCEDURE — 1159F MED LIST DOCD IN RCRD: CPT | Mod: ,,, | Performed by: NURSE PRACTITIONER

## 2024-03-14 PROCEDURE — 3008F BODY MASS INDEX DOCD: CPT | Mod: ,,, | Performed by: NURSE PRACTITIONER

## 2024-03-14 PROCEDURE — 1160F RVW MEDS BY RX/DR IN RCRD: CPT | Mod: ,,, | Performed by: NURSE PRACTITIONER

## 2024-03-14 PROCEDURE — 80053 COMPREHEN METABOLIC PANEL: CPT | Mod: ,,, | Performed by: CLINICAL MEDICAL LABORATORY

## 2024-03-14 PROCEDURE — 4010F ACE/ARB THERAPY RXD/TAKEN: CPT | Mod: ,,, | Performed by: NURSE PRACTITIONER

## 2024-03-14 PROCEDURE — 99214 OFFICE O/P EST MOD 30 MIN: CPT | Mod: ,,, | Performed by: NURSE PRACTITIONER

## 2024-03-14 PROCEDURE — 82043 UR ALBUMIN QUANTITATIVE: CPT | Mod: ,,, | Performed by: CLINICAL MEDICAL LABORATORY

## 2024-03-14 RX ORDER — IBUPROFEN 800 MG/1
800 TABLET ORAL EVERY 6 HOURS PRN
Qty: 60 TABLET | Refills: 2 | Status: SHIPPED | OUTPATIENT
Start: 2024-03-14

## 2024-03-14 NOTE — ASSESSMENT & PLAN NOTE
BP Readings from Last 3 Encounters:   03/14/24 130/85   01/30/24 129/79   12/06/23 136/82    The current medical regimen is effective;  continue present plan and medications.

## 2024-03-14 NOTE — PROGRESS NOTES
TU Sanchez   [facility] [address] [place] [address]  [phone]      PATIENT NAME: Judy Miller  : 1950  DATE: 3/14/24  MRN: [id number]      Billing Provider: TU Sanchez  Level of Service:   Patient PCP Information       Provider PCP Type    TU Sanchez General            Reason for Visit / Chief Complaint: Follow-up, Hypertension, and Hyperlipidemia (3 mos)       Update PCP  Update Chief Complaint         History of Present Illness / Problem Focused Workflow     Judy Miller presents to the clinic with Follow-up, Hypertension, and Hyperlipidemia (3 mos)     Pt presents for routine follow up with lab and med refills. Overall doing well.      BP appears  controlled today. Home meds reviewed  The current medical regimen is effective;  continue present plan and medications. Recommended patient to check home readings to monitor and see me for followup as scheduled or sooner as needed.   Discussed sodium restriction, maintaining ideal body weight and regular exercise program as physiologic means to continue to achieve blood pressure control in addition to medication compliance.  Patient was educated that both decongestant and anti-inflammatory medication may raise blood pressure.  Advised to monitor BP at home. Advised on optimal BP readings - SBP < 130 & DBP < 80. Advised to call office for any persistent BP elevation and may have to prescribe or adjust BP med(s).  Recommended DASH diet, stay well hydrated with water daily, eliminate or decrease caffeinated and high calorie drinks, increase physical activity, and lose weight if BMI > 25.0.    Discussed need for low fat/low cholesterol diet, regular exercise, and weight control.   Cardiovascular risk and specific lipid/LDL goals reviewed.      Diabetes treatment goals: (unless otherwise indicated due to risk factor  stratification)  To achieve normal or near normal glucose levels.  Fasting glucose:   Before meals: 100-140  2 hours after meal:less 130  Bedtime goal glucose > 100    Health Maintenance:  Pt will address Colonoscopy next appt. She is going out of town for several months           Review of Systems     Review of Systems   Constitutional:  Negative for fatigue and fever.   HENT:  Negative for nasal congestion and sore throat.    Eyes:  Negative for visual disturbance.   Respiratory:  Negative for chest tightness and shortness of breath.    Cardiovascular:  Negative for chest pain and leg swelling.   Gastrointestinal:  Negative for abdominal pain and change in bowel habit.   Endocrine: Negative for polydipsia, polyphagia and polyuria.   Genitourinary:  Negative for dysuria and hematuria.   Musculoskeletal:  Negative for back pain and leg pain.   Integumentary:  Negative for rash.   Neurological:  Negative for dizziness, syncope, weakness and light-headedness.        Medical / Social / Family History     Past Medical History:   Diagnosis Date    Diabetes mellitus, type 2     GERD (gastroesophageal reflux disease)     Hyperlipidemia     Hypertension        Past Surgical History:   Procedure Laterality Date    HIP REPLACEMENT ARTHROPLASTY Left     TUBAL LIGATION         Social History  Ms. Miller  reports that she has never smoked. She has never used smokeless tobacco. She reports that she does not currently use alcohol. She reports that she does not use drugs.    Family History  Ms. Miller's family history includes Diabetes in her mother and sister; Heart disease in her mother; Hypertension in her mother.    Medications and Allergies     Medications  Outpatient Medications Marked as Taking for the 3/14/24 encounter (Office Visit) with Vera Velez ACNP   Medication Sig Dispense Refill    aspirin (ECOTRIN) 81 MG EC tablet Take 81 mg by mouth once daily.      blood glucose control, low (TRUE METRIX LEVEL 1)  Soln Inject 1 each into the skin Daily. 1 each 1    blood sugar diagnostic (TRUE METRIX GLUCOSE TEST STRIP) Strp Inject 1 each into the skin Daily. 200 strip 3    blood-glucose meter (TRUE METRIX AIR GLUCOSE METER) Misc Inject 1 each into the skin Daily. 1 each 1    clobetasoL (TEMOVATE) 0.05 % cream Apply topically 2 (two) times daily. Apply a thin layer to the affected area(s) 30 g 1    fluticasone propionate (FLONASE) 50 mcg/actuation nasal spray 2 sprays (100 mcg total) by Each Nostril route once daily. 16 g 2    glipiZIDE (GLUCOTROL) 2.5 MG TR24 Take 2 tablets (5 mg total) by mouth daily with breakfast. 90 tablet 1    hydroCHLOROthiazide (HYDRODIURIL) 25 MG tablet Take 1 tablet (25 mg total) by mouth once daily. 90 tablet 1    lancets (TRUEPLUS LANCETS) 33 gauge Misc Inject 1 lancet  into the skin Daily. 200 each 3    lisinopriL 10 MG tablet Take 1 tablet (10 mg total) by mouth once daily. 90 tablet 1    loratadine (CLARITIN) 10 mg tablet Take 1 tablet (10 mg total) by mouth once daily. 90 tablet 1    ondansetron (ZOFRAN-ODT) 4 MG TbDL Take 1 tablet (4 mg total) by mouth every 8 (eight) hours as needed (for nausea/vomiting). 10 tablet 0    pantoprazole (PROTONIX) 40 MG tablet Take 1 tablet (40 mg total) by mouth once daily. 90 tablet 1    polyethylene glycol (GLYCOLAX) 17 gram PwPk Take 17 g by mouth once daily. Mixed with 8 oz water, juice, soda, coffee or tea      potassium chloride SA (K-DUR,KLOR-CON M) 10 MEQ tablet Take 1 tablet (10 mEq total) by mouth once daily. With food 90 tablet 1    pravastatin (PRAVACHOL) 20 MG tablet Take 1 tablet (20 mg total) by mouth every evening. 90 tablet 1    verapamiL (CALAN-SR) 240 MG CR tablet Take 1 tablet (240 mg total) by mouth once daily. With food 90 tablet 1    [DISCONTINUED] ibuprofen (ADVIL,MOTRIN) 800 MG tablet Take 1 tablet (800 mg total) by mouth every 6 (six) hours as needed for Pain. 60 tablet 2       Allergies  Review of patient's allergies indicates:    Allergen Reactions    Pneumococcal vaccine Swelling       Physical Examination     Vitals:    03/14/24 0948   BP: 130/85   Pulse:    Resp:    Temp:      Physical Exam  Eyes:      Pupils: Pupils are equal, round, and reactive to light.   Cardiovascular:      Rate and Rhythm: Normal rate and regular rhythm.      Heart sounds: Normal heart sounds. No murmur heard.  Pulmonary:      Breath sounds: Normal breath sounds. No wheezing, rhonchi or rales.   Abdominal:      General: Bowel sounds are normal.   Musculoskeletal:         General: No swelling.      Cervical back: Normal range of motion and neck supple.   Skin:     General: Skin is warm and dry.   Neurological:      Mental Status: She is alert and oriented to person, place, and time.          Lab Results   Component Value Date    WBC 6.70 08/08/2022    HGB 14.6 08/08/2022    HCT 43.7 08/08/2022    MCV 93.4 08/08/2022     08/08/2022        Sodium   Date Value Ref Range Status   11/27/2023 138 136 - 145 mmol/L Final     Potassium   Date Value Ref Range Status   11/27/2023 3.7 3.5 - 5.1 mmol/L Final     Chloride   Date Value Ref Range Status   11/27/2023 108 (H) 98 - 107 mmol/L Final     CO2   Date Value Ref Range Status   11/27/2023 25 21 - 32 mmol/L Final     Glucose   Date Value Ref Range Status   11/27/2023 107 (H) 74 - 106 mg/dL Final     BUN   Date Value Ref Range Status   11/27/2023 12 7 - 18 mg/dL Final     Creatinine   Date Value Ref Range Status   11/27/2023 0.97 0.55 - 1.02 mg/dL Final     Calcium   Date Value Ref Range Status   11/27/2023 9.1 8.5 - 10.1 mg/dL Final     Total Protein   Date Value Ref Range Status   11/27/2023 8.4 (H) 6.4 - 8.2 g/dL Final     Albumin   Date Value Ref Range Status   11/27/2023 4.1 3.5 - 5.0 g/dL Final     Bilirubin, Total   Date Value Ref Range Status   11/27/2023 0.5 >0.0 - 1.2 mg/dL Final     Alk Phos   Date Value Ref Range Status   11/27/2023 60 55 - 142 U/L Final     AST   Date Value Ref Range Status   11/27/2023  "12 (L) 15 - 37 U/L Final     ALT   Date Value Ref Range Status   11/27/2023 22 13 - 56 U/L Final     Anion Gap   Date Value Ref Range Status   11/27/2023 9 7 - 16 mmol/L Final     eGFR   Date Value Ref Range Status   11/27/2023 62 >=60 mL/min/1.73m2 Final      Lab Results   Component Value Date    HGBA1C 5.1 11/27/2023      Lab Results   Component Value Date    CHOL 165 11/27/2023    CHOL 174 07/27/2023    CHOL 177 03/27/2023     Lab Results   Component Value Date    HDL 64 (H) 11/27/2023    HDL 69 (H) 07/27/2023    HDL 76 (H) 03/27/2023     Lab Results   Component Value Date    LDLCALC 85 11/27/2023    LDLCALC 90 07/27/2023    LDLCALC 92 03/27/2023     No results found for: "DLDL"  Lab Results   Component Value Date    TRIG 80 11/27/2023    TRIG 76 07/27/2023    TRIG 44 03/27/2023     Lab Results   Component Value Date    CHOLHDL 2.6 11/27/2023    CHOLHDL 2.5 07/27/2023    CHOLHDL 2.3 03/27/2023      Lab Results   Component Value Date    TSH 1.170 11/27/2023    FREET4 0.90 11/27/2023        Assessment and Plan (including Health Maintenance)      Problem List  Smart Sets  Document Outside HM   :    Plan:     1. Hypertension, unspecified type  Assessment & Plan:  BP Readings from Last 3 Encounters:   03/14/24 130/85   01/30/24 129/79   12/06/23 136/82    The current medical regimen is effective;  continue present plan and medications.      Orders:  -     CBC Auto Differential; Future; Expected date: 03/14/2024  -     Comprehensive Metabolic Panel; Future; Expected date: 03/14/2024  -     Microalbumin/Creatinine Ratio, Urine; Future; Expected date: 03/14/2024    2. Hyperlipidemia, unspecified hyperlipidemia type  Assessment & Plan:  Lab Results   Component Value Date    CHOL 165 11/27/2023    CHOL 174 07/27/2023    CHOL 177 03/27/2023     Lab Results   Component Value Date    HDL 64 (H) 11/27/2023    HDL 69 (H) 07/27/2023    HDL 76 (H) 03/27/2023     Lab Results   Component Value Date    LDLCALC 85 11/27/2023    " LDLCALC 90 07/27/2023    LDLCALC 92 03/27/2023     Lab Results   Component Value Date    TRIG 80 11/27/2023    TRIG 76 07/27/2023    TRIG 44 03/27/2023       Lab Results   Component Value Date    CHOLHDL 2.6 11/27/2023    CHOLHDL 2.5 07/27/2023    CHOLHDL 2.3 03/27/2023    The current medical regimen is effective;  continue present plan and medications.      Orders:  -     Lipid Panel; Future; Expected date: 03/14/2024    3. Type 2 diabetes mellitus with diabetic dermatitis, without long-term current use of insulin  Assessment & Plan:  Lab Results   Component Value Date    HGBA1C 5.1 11/27/2023    HGBA1C 5.0 07/27/2023    HGBA1C 5.0 03/27/2023     Lab Results   Component Value Date    MICROALBUR 0.7 08/08/2022    LDLCALC 85 11/27/2023    CREATININE 0.97 11/27/2023    The current medical regimen is effective;  continue present plan and medications.        4. Morbid (severe) obesity due to excess calories    5. Chronic kidney disease, stage 3a  Assessment & Plan:  Lab Results   Component Value Date    CREATININE 0.97 11/27/2023    BUN 12 11/27/2023     11/27/2023    K 3.7 11/27/2023     (H) 11/27/2023    CO2 25 11/27/2023    The current medical regimen is effective;  continue present plan and medications.        6. Major depressive disorder, single episode, in full remission    Other orders  -     ibuprofen (ADVIL,MOTRIN) 800 MG tablet; Take 1 tablet (800 mg total) by mouth every 6 (six) hours as needed for Pain.  Dispense: 60 tablet; Refill: 2         There are no Patient Instructions on file for this visit.     Health Maintenance Due   Topic Date Due    Colorectal Cancer Screening  01/09/2022    Diabetes Urine Screening  08/08/2023    Foot Exam  03/27/2024         Health Maintenance Topics with due status: Not Due       Topic Last Completion Date    Mammogram 08/21/2023    DEXA Scan 08/21/2023    Lipid Panel 11/27/2023    Hemoglobin A1c 11/27/2023    Low Dose Statin 03/14/2024       Future Appointments    Date Time Provider Department Center   6/25/2024 10:20 AM Vera Velez ACNP [facility] [place]   8/26/2024 11:30 AM [facility]   2/4/2025 11:00 AM AWV NURSE, [facility] [address] [facility] [place]            Signature:  TU Sanchez  [facility] [address] [place] [address]  [phone]    Date of encounter: 3/14/24

## 2024-03-14 NOTE — ASSESSMENT & PLAN NOTE
Lab Results   Component Value Date    CREATININE 0.97 11/27/2023    BUN 12 11/27/2023     11/27/2023    K 3.7 11/27/2023     (H) 11/27/2023    CO2 25 11/27/2023    The current medical regimen is effective;  continue present plan and medications.

## 2024-03-15 LAB
CREAT UR-MCNC: 94 MG/DL (ref 28–219)
MICROALBUMIN UR-MCNC: 0.6 MG/DL (ref 0–2.8)
MICROALBUMIN/CREAT RATIO PNL UR: 6.4 MG/G (ref 0–30)

## 2024-03-26 DIAGNOSIS — E11.9 TYPE 2 DIABETES MELLITUS WITHOUT COMPLICATION, WITHOUT LONG-TERM CURRENT USE OF INSULIN: ICD-10-CM

## 2024-03-26 DIAGNOSIS — E78.5 HYPERLIPIDEMIA, UNSPECIFIED HYPERLIPIDEMIA TYPE: ICD-10-CM

## 2024-03-26 DIAGNOSIS — I10 HYPERTENSION, UNSPECIFIED TYPE: ICD-10-CM

## 2024-03-26 RX ORDER — LANCETS 33 GAUGE
1 EACH MISCELLANEOUS DAILY
Qty: 200 EACH | Refills: 3 | Status: SHIPPED | OUTPATIENT
Start: 2024-03-26

## 2024-03-26 RX ORDER — PANTOPRAZOLE SODIUM 40 MG/1
40 TABLET, DELAYED RELEASE ORAL DAILY
Qty: 90 TABLET | Refills: 1 | Status: SHIPPED | OUTPATIENT
Start: 2024-03-26

## 2024-03-26 RX ORDER — POLYETHYLENE GLYCOL 3350 17 G/17G
17 POWDER, FOR SOLUTION ORAL DAILY
Qty: 90 PACKET | Refills: 1 | Status: SHIPPED | OUTPATIENT
Start: 2024-03-26

## 2024-03-26 RX ORDER — BLOOD-GLUCOSE METER
1 EACH MISCELLANEOUS DAILY
Qty: 1 EACH | Refills: 1 | Status: SHIPPED | OUTPATIENT
Start: 2024-03-26

## 2024-03-26 RX ORDER — LISINOPRIL 10 MG/1
10 TABLET ORAL DAILY
Qty: 90 TABLET | Refills: 1 | Status: SHIPPED | OUTPATIENT
Start: 2024-03-26 | End: 2024-04-05 | Stop reason: SDUPTHER

## 2024-03-26 RX ORDER — ONDANSETRON 4 MG/1
4 TABLET, ORALLY DISINTEGRATING ORAL EVERY 8 HOURS PRN
Qty: 10 TABLET | Refills: 0 | Status: SHIPPED | OUTPATIENT
Start: 2024-03-26

## 2024-03-26 RX ORDER — VERAPAMIL HYDROCHLORIDE 240 MG/1
240 TABLET, FILM COATED, EXTENDED RELEASE ORAL DAILY
Qty: 90 TABLET | Refills: 1 | Status: SHIPPED | OUTPATIENT
Start: 2024-03-26 | End: 2024-04-05 | Stop reason: SDUPTHER

## 2024-03-26 RX ORDER — POTASSIUM CHLORIDE 750 MG/1
10 TABLET, EXTENDED RELEASE ORAL DAILY
Qty: 90 TABLET | Refills: 1 | Status: SHIPPED | OUTPATIENT
Start: 2024-03-26

## 2024-03-26 RX ORDER — HYDROCHLOROTHIAZIDE 25 MG/1
25 TABLET ORAL DAILY
Qty: 90 TABLET | Refills: 1 | Status: SHIPPED | OUTPATIENT
Start: 2024-03-26

## 2024-03-26 RX ORDER — ESCITALOPRAM OXALATE 10 MG/1
10 TABLET ORAL DAILY
Qty: 90 TABLET | Refills: 1 | Status: SHIPPED | OUTPATIENT
Start: 2024-03-26

## 2024-03-26 RX ORDER — PRAVASTATIN SODIUM 20 MG/1
20 TABLET ORAL NIGHTLY
Qty: 90 TABLET | Refills: 1 | Status: SHIPPED | OUTPATIENT
Start: 2024-03-26

## 2024-03-26 RX ORDER — GLIPIZIDE 2.5 MG/1
5 TABLET, EXTENDED RELEASE ORAL
Qty: 90 TABLET | Refills: 1 | Status: SHIPPED | OUTPATIENT
Start: 2024-03-26

## 2024-03-26 RX ORDER — ASPIRIN 81 MG/1
81 TABLET ORAL DAILY
Qty: 90 TABLET | Refills: 1 | Status: SHIPPED | OUTPATIENT
Start: 2024-03-26

## 2024-03-26 NOTE — TELEPHONE ENCOUNTER
----- Message from Kathia Armendariz sent at 3/26/2024 10:38 AM CDT -----  Pt was supposed to have all meds renewed on last appt 03/14 but none were except for Ibuprofen. She wants a call back about this as she is about to be out of all of them. [phone].

## 2024-04-05 DIAGNOSIS — I10 HYPERTENSION, UNSPECIFIED TYPE: ICD-10-CM

## 2024-04-05 RX ORDER — VERAPAMIL HYDROCHLORIDE 240 MG/1
240 TABLET, FILM COATED, EXTENDED RELEASE ORAL DAILY
Qty: 90 TABLET | Refills: 1 | Status: SHIPPED | OUTPATIENT
Start: 2024-04-05 | End: 2024-04-11 | Stop reason: SDUPTHER

## 2024-04-05 RX ORDER — LISINOPRIL 10 MG/1
10 TABLET ORAL DAILY
Qty: 90 TABLET | Refills: 1 | Status: SHIPPED | OUTPATIENT
Start: 2024-04-05

## 2024-04-05 NOTE — TELEPHONE ENCOUNTER
----- Message from Juanita Zuniga sent at 4/5/2024 11:03 AM CDT -----  Regarding: REFILL  PATIENT CALL TO GET A REFILL ON (VERAPAMIL AND LISINOPRIL) SENT TO [facility] CALL BACK NUMBER -[phone] AND COULD YOU CALL THE PATIENT BACK AT LET HER KNOW IT WAS SENT IN

## 2024-04-11 ENCOUNTER — TELEPHONE (OUTPATIENT)
Dept: FAMILY MEDICINE | Facility: CLINIC | Age: 74
End: 2024-04-11
Payer: MEDICARE

## 2024-04-11 DIAGNOSIS — I10 HYPERTENSION, UNSPECIFIED TYPE: ICD-10-CM

## 2024-04-11 RX ORDER — VERAPAMIL HYDROCHLORIDE 240 MG/1
240 TABLET, FILM COATED, EXTENDED RELEASE ORAL DAILY
Qty: 90 TABLET | Refills: 1 | Status: SHIPPED | OUTPATIENT
Start: 2024-04-11

## 2024-04-25 LAB
LEFT EYE DM RETINOPATHY: NORMAL
RIGHT EYE DM RETINOPATHY: NORMAL

## 2024-05-06 PROBLEM — Z00.00 ENCOUNTER FOR SUBSEQUENT ANNUAL WELLNESS VISIT (AWV) IN MEDICARE PATIENT: Status: RESOLVED | Noted: 2024-02-01 | Resolved: 2024-05-06

## 2024-05-14 ENCOUNTER — PATIENT OUTREACH (OUTPATIENT)
Dept: FAMILY MEDICINE | Facility: CLINIC | Age: 74
End: 2024-05-14
Payer: MEDICARE

## 2024-05-20 ENCOUNTER — TELEPHONE (OUTPATIENT)
Dept: FAMILY MEDICINE | Facility: CLINIC | Age: 74
End: 2024-05-20
Payer: MEDICARE

## 2024-05-20 DIAGNOSIS — M25.552 CHRONIC LEFT HIP PAIN: Primary | ICD-10-CM

## 2024-05-20 DIAGNOSIS — G89.29 CHRONIC LEFT HIP PAIN: Primary | ICD-10-CM

## 2024-05-20 NOTE — TELEPHONE ENCOUNTER
----- Message from Dara Richardson sent at 5/20/2024  8:33 AM CDT -----  Having problems with her hip. She stated she has had a hip replacement and now her other hip is hurting. Can you refer her to orthoDr. Trav at [place]. Call back # [phone]

## 2024-06-25 ENCOUNTER — OFFICE VISIT (OUTPATIENT)
Dept: FAMILY MEDICINE | Facility: CLINIC | Age: 74
End: 2024-06-25
Payer: MEDICARE

## 2024-06-25 VITALS
RESPIRATION RATE: 16 BRPM | HEIGHT: 66 IN | HEART RATE: 71 BPM | TEMPERATURE: 99 F | BODY MASS INDEX: 34.86 KG/M2 | SYSTOLIC BLOOD PRESSURE: 127 MMHG | DIASTOLIC BLOOD PRESSURE: 83 MMHG | WEIGHT: 216.88 LBS | OXYGEN SATURATION: 98 %

## 2024-06-25 DIAGNOSIS — N18.31 CHRONIC KIDNEY DISEASE, STAGE 3A: ICD-10-CM

## 2024-06-25 DIAGNOSIS — I10 HYPERTENSION, UNSPECIFIED TYPE: Primary | ICD-10-CM

## 2024-06-25 DIAGNOSIS — E11.620 TYPE 2 DIABETES MELLITUS WITH DIABETIC DERMATITIS, WITHOUT LONG-TERM CURRENT USE OF INSULIN: ICD-10-CM

## 2024-06-25 DIAGNOSIS — E78.5 HYPERLIPIDEMIA, UNSPECIFIED HYPERLIPIDEMIA TYPE: ICD-10-CM

## 2024-06-25 LAB
ALBUMIN SERPL BCP-MCNC: 4 G/DL (ref 3.5–5)
ALBUMIN/GLOB SERPL: 1 {RATIO}
ALP SERPL-CCNC: 65 U/L (ref 55–142)
ALT SERPL W P-5'-P-CCNC: 19 U/L (ref 13–56)
ANION GAP SERPL CALCULATED.3IONS-SCNC: 11 MMOL/L (ref 7–16)
AST SERPL W P-5'-P-CCNC: 9 U/L (ref 15–37)
BILIRUB SERPL-MCNC: 0.5 MG/DL (ref ?–1.2)
BUN SERPL-MCNC: 17 MG/DL (ref 7–18)
BUN/CREAT SERPL: 15 (ref 6–20)
CALCIUM SERPL-MCNC: 9.3 MG/DL (ref 8.5–10.1)
CHLORIDE SERPL-SCNC: 102 MMOL/L (ref 98–107)
CHOLEST SERPL-MCNC: 166 MG/DL (ref 0–200)
CHOLEST/HDLC SERPL: 2.8 {RATIO}
CO2 SERPL-SCNC: 28 MMOL/L (ref 21–32)
CREAT SERPL-MCNC: 1.13 MG/DL (ref 0.55–1.02)
EGFR (NO RACE VARIABLE) (RUSH/TITUS): 51 ML/MIN/1.73M2
EST. AVERAGE GLUCOSE BLD GHB EST-MCNC: 103 MG/DL
GLOBULIN SER-MCNC: 4.2 G/DL (ref 2–4)
GLUCOSE SERPL-MCNC: 120 MG/DL (ref 74–106)
HBA1C MFR BLD HPLC: 5.2 % (ref 4.5–6.6)
HDLC SERPL-MCNC: 60 MG/DL (ref 40–60)
LDLC SERPL CALC-MCNC: 90 MG/DL
LDLC/HDLC SERPL: 1.5 {RATIO}
NONHDLC SERPL-MCNC: 106 MG/DL
POTASSIUM SERPL-SCNC: 3.4 MMOL/L (ref 3.5–5.1)
PROT SERPL-MCNC: 8.2 G/DL (ref 6.4–8.2)
SODIUM SERPL-SCNC: 138 MMOL/L (ref 136–145)
TRIGL SERPL-MCNC: 79 MG/DL (ref 35–150)
VLDLC SERPL-MCNC: 16 MG/DL

## 2024-06-25 PROCEDURE — 83036 HEMOGLOBIN GLYCOSYLATED A1C: CPT | Mod: ,,, | Performed by: CLINICAL MEDICAL LABORATORY

## 2024-06-25 PROCEDURE — 3079F DIAST BP 80-89 MM HG: CPT | Mod: ,,, | Performed by: NURSE PRACTITIONER

## 2024-06-25 PROCEDURE — 1160F RVW MEDS BY RX/DR IN RCRD: CPT | Mod: ,,, | Performed by: NURSE PRACTITIONER

## 2024-06-25 PROCEDURE — 4010F ACE/ARB THERAPY RXD/TAKEN: CPT | Mod: ,,, | Performed by: NURSE PRACTITIONER

## 2024-06-25 PROCEDURE — 3074F SYST BP LT 130 MM HG: CPT | Mod: ,,, | Performed by: NURSE PRACTITIONER

## 2024-06-25 PROCEDURE — 80061 LIPID PANEL: CPT | Mod: ,,, | Performed by: CLINICAL MEDICAL LABORATORY

## 2024-06-25 PROCEDURE — 80053 COMPREHEN METABOLIC PANEL: CPT | Mod: ,,, | Performed by: CLINICAL MEDICAL LABORATORY

## 2024-06-25 PROCEDURE — 3288F FALL RISK ASSESSMENT DOCD: CPT | Mod: ,,, | Performed by: NURSE PRACTITIONER

## 2024-06-25 PROCEDURE — 3008F BODY MASS INDEX DOCD: CPT | Mod: ,,, | Performed by: NURSE PRACTITIONER

## 2024-06-25 PROCEDURE — 1101F PT FALLS ASSESS-DOCD LE1/YR: CPT | Mod: ,,, | Performed by: NURSE PRACTITIONER

## 2024-06-25 PROCEDURE — 3061F NEG MICROALBUMINURIA REV: CPT | Mod: ,,, | Performed by: NURSE PRACTITIONER

## 2024-06-25 PROCEDURE — 99214 OFFICE O/P EST MOD 30 MIN: CPT | Mod: ,,, | Performed by: NURSE PRACTITIONER

## 2024-06-25 PROCEDURE — 3066F NEPHROPATHY DOC TX: CPT | Mod: ,,, | Performed by: NURSE PRACTITIONER

## 2024-06-25 PROCEDURE — 1159F MED LIST DOCD IN RCRD: CPT | Mod: ,,, | Performed by: NURSE PRACTITIONER

## 2024-06-25 RX ORDER — PRAVASTATIN SODIUM 20 MG/1
20 TABLET ORAL NIGHTLY
Qty: 90 TABLET | Refills: 1 | Status: SHIPPED | OUTPATIENT
Start: 2024-06-25

## 2024-06-25 RX ORDER — HYDROCHLOROTHIAZIDE 25 MG/1
25 TABLET ORAL DAILY
Qty: 90 TABLET | Refills: 1 | Status: SHIPPED | OUTPATIENT
Start: 2024-06-25

## 2024-06-25 RX ORDER — ESCITALOPRAM OXALATE 10 MG/1
10 TABLET ORAL DAILY
Qty: 90 TABLET | Refills: 1 | Status: SHIPPED | OUTPATIENT
Start: 2024-06-25

## 2024-06-25 RX ORDER — GLIPIZIDE 2.5 MG/1
5 TABLET, EXTENDED RELEASE ORAL
Qty: 90 TABLET | Refills: 1 | Status: SHIPPED | OUTPATIENT
Start: 2024-06-25

## 2024-06-25 RX ORDER — POLYETHYLENE GLYCOL 3350 17 G/17G
17 POWDER, FOR SOLUTION ORAL DAILY
Qty: 90 PACKET | Refills: 1 | Status: SHIPPED | OUTPATIENT
Start: 2024-06-25

## 2024-06-25 RX ORDER — OMEPRAZOLE 40 MG/1
40 CAPSULE, DELAYED RELEASE ORAL DAILY
Qty: 90 CAPSULE | Refills: 1 | Status: SHIPPED | OUTPATIENT
Start: 2024-06-25 | End: 2025-06-25

## 2024-06-25 RX ORDER — VERAPAMIL HYDROCHLORIDE 240 MG/1
240 TABLET, FILM COATED, EXTENDED RELEASE ORAL DAILY
Qty: 90 TABLET | Refills: 1 | Status: SHIPPED | OUTPATIENT
Start: 2024-06-25

## 2024-06-25 RX ORDER — POTASSIUM CHLORIDE 750 MG/1
10 TABLET, EXTENDED RELEASE ORAL DAILY
Qty: 90 TABLET | Refills: 1 | Status: SHIPPED | OUTPATIENT
Start: 2024-06-25

## 2024-06-25 RX ORDER — LISINOPRIL 10 MG/1
10 TABLET ORAL DAILY
Qty: 90 TABLET | Refills: 1 | Status: SHIPPED | OUTPATIENT
Start: 2024-06-25

## 2024-06-25 NOTE — ASSESSMENT & PLAN NOTE
Lab Results   Component Value Date    HGBA1C 5.1 11/27/2023    HGBA1C 5.0 07/27/2023    HGBA1C 5.0 03/27/2023     Lab Results   Component Value Date    MICROALBUR 0.6 03/14/2024    LDLCALC 77 03/14/2024    CREATININE 1.15 (H) 03/14/2024    The current medical regimen is effective;  continue present plan and medications.

## 2024-06-25 NOTE — ASSESSMENT & PLAN NOTE
BP Readings from Last 3 Encounters:   06/25/24 127/83   03/14/24 130/85   01/30/24 129/79    The current medical regimen is effective;  continue present plan and medications.

## 2024-06-25 NOTE — ASSESSMENT & PLAN NOTE
Lab Results   Component Value Date    CHOL 152 03/14/2024    CHOL 165 11/27/2023    CHOL 174 07/27/2023     Lab Results   Component Value Date    HDL 63 (H) 03/14/2024    HDL 64 (H) 11/27/2023    HDL 69 (H) 07/27/2023     Lab Results   Component Value Date    LDLCALC 77 03/14/2024    LDLCALC 85 11/27/2023    LDLCALC 90 07/27/2023     Lab Results   Component Value Date    TRIG 59 03/14/2024    TRIG 80 11/27/2023    TRIG 76 07/27/2023       Lab Results   Component Value Date    CHOLHDL 2.4 03/14/2024    CHOLHDL 2.6 11/27/2023    CHOLHDL 2.5 07/27/2023    The current medical regimen is effective;  continue present plan and medications.

## 2024-06-25 NOTE — ASSESSMENT & PLAN NOTE
Lab Results   Component Value Date    CREATININE 1.15 (H) 03/14/2024    BUN 15 03/14/2024     03/14/2024    K 3.8 03/14/2024     (H) 03/14/2024    CO2 28 03/14/2024    The current medical regimen is effective;  continue present plan and medications.

## 2024-07-03 DIAGNOSIS — Z12.31 BREAST CANCER SCREENING BY MAMMOGRAM: Primary | ICD-10-CM

## 2024-07-17 ENCOUNTER — TELEPHONE (OUTPATIENT)
Dept: FAMILY MEDICINE | Facility: CLINIC | Age: 74
End: 2024-07-17
Payer: MEDICARE

## 2024-07-17 DIAGNOSIS — Z12.11 SCREENING FOR COLON CANCER: Primary | ICD-10-CM

## 2024-07-17 RX ORDER — IBUPROFEN 800 MG/1
800 TABLET ORAL EVERY 6 HOURS PRN
Qty: 60 TABLET | Refills: 2 | Status: SHIPPED | OUTPATIENT
Start: 2024-07-17

## 2024-07-23 ENCOUNTER — TELEPHONE (OUTPATIENT)
Dept: FAMILY MEDICINE | Facility: CLINIC | Age: 74
End: 2024-07-23
Payer: MEDICARE

## 2024-07-23 NOTE — TELEPHONE ENCOUNTER
----- Message from Luciana Ley sent at 7/23/2024 10:41 AM CDT -----  Who Called: Judy Miller    Refill or New Rx:Refill  RX Name and Strength:ibuprofen (ADVIL,MOTRIN) 800 MG tablet    List of preferred pharmacies on file (remove unneeded): [unfilled]  If different Pharmacy is requested, enter Pharmacy information here including location and phone number: [facility] [place] - [phone]   Ordering Provider:      Preferred Method of Contact: Phone Call  Patient's Preferred Phone Number on File: [phone]   Best Call Back Number, if different:  Additional Information: pt is wanting to follow up w refill request

## 2024-07-29 ENCOUNTER — OFFICE VISIT (OUTPATIENT)
Dept: FAMILY MEDICINE | Facility: CLINIC | Age: 74
End: 2024-07-29
Payer: MEDICARE

## 2024-07-29 VITALS
HEIGHT: 66 IN | HEART RATE: 78 BPM | SYSTOLIC BLOOD PRESSURE: 135 MMHG | BODY MASS INDEX: 35.42 KG/M2 | TEMPERATURE: 99 F | DIASTOLIC BLOOD PRESSURE: 85 MMHG | WEIGHT: 220.38 LBS | OXYGEN SATURATION: 94 %

## 2024-07-29 DIAGNOSIS — M25.552 CHRONIC LEFT HIP PAIN: Primary | ICD-10-CM

## 2024-07-29 DIAGNOSIS — G89.29 CHRONIC LEFT HIP PAIN: Primary | ICD-10-CM

## 2024-07-29 PROCEDURE — 4010F ACE/ARB THERAPY RXD/TAKEN: CPT | Mod: ,,, | Performed by: NURSE PRACTITIONER

## 2024-07-29 PROCEDURE — 1160F RVW MEDS BY RX/DR IN RCRD: CPT | Mod: ,,, | Performed by: NURSE PRACTITIONER

## 2024-07-29 PROCEDURE — 3061F NEG MICROALBUMINURIA REV: CPT | Mod: ,,, | Performed by: NURSE PRACTITIONER

## 2024-07-29 PROCEDURE — 1125F AMNT PAIN NOTED PAIN PRSNT: CPT | Mod: ,,, | Performed by: NURSE PRACTITIONER

## 2024-07-29 PROCEDURE — 99213 OFFICE O/P EST LOW 20 MIN: CPT | Mod: ,,, | Performed by: NURSE PRACTITIONER

## 2024-07-29 PROCEDURE — 3288F FALL RISK ASSESSMENT DOCD: CPT | Mod: ,,, | Performed by: NURSE PRACTITIONER

## 2024-07-29 PROCEDURE — 3079F DIAST BP 80-89 MM HG: CPT | Mod: ,,, | Performed by: NURSE PRACTITIONER

## 2024-07-29 PROCEDURE — 3066F NEPHROPATHY DOC TX: CPT | Mod: ,,, | Performed by: NURSE PRACTITIONER

## 2024-07-29 PROCEDURE — 1101F PT FALLS ASSESS-DOCD LE1/YR: CPT | Mod: ,,, | Performed by: NURSE PRACTITIONER

## 2024-07-29 PROCEDURE — 1159F MED LIST DOCD IN RCRD: CPT | Mod: ,,, | Performed by: NURSE PRACTITIONER

## 2024-07-29 PROCEDURE — 3075F SYST BP GE 130 - 139MM HG: CPT | Mod: ,,, | Performed by: NURSE PRACTITIONER

## 2024-07-29 PROCEDURE — 3044F HG A1C LEVEL LT 7.0%: CPT | Mod: ,,, | Performed by: NURSE PRACTITIONER

## 2024-07-29 PROCEDURE — 3008F BODY MASS INDEX DOCD: CPT | Mod: ,,, | Performed by: NURSE PRACTITIONER

## 2024-07-29 RX ORDER — DICLOFENAC SODIUM 25 MG/1
25 TABLET, DELAYED RELEASE ORAL 2 TIMES DAILY
Qty: 60 TABLET | Refills: 6 | Status: SHIPPED | OUTPATIENT
Start: 2024-07-29

## 2024-07-29 NOTE — PROGRESS NOTES
TU Sanchez   [facility] [address] [place] [address]  [phone]      PATIENT NAME: Judy Miller  : 1950  DATE: 24  MRN: [id number]      Billing Provider: TU Sanchez  Level of Service:   Patient PCP Information       Provider PCP Type    TU Sanchez General            Reason for Visit / Chief Complaint: inflammation in hip (Patient states she was seen by an orthopedist and told she had inflammation in her right hip and was told to exercise but states it is still not helping. )       Update PCP  Update Chief Complaint         History of Present Illness / Problem Focused Workflow     Judy Miller presents to the clinic with inflammation in hip (Patient states she was seen by an orthopedist and told she had inflammation in her right hip and was told to exercise but states it is still not helping. )     Pt presents for acute on chronic hip pain.  She reports she was recently seen by Orthopedics and was told that she had some inflammation and some arthritis in her hip but did not recommend surgery at this time.  Patient reports that she was not given anything for pain or discomfort and was told to follow up with her primary care provider.  Patient reports she was doing well otherwise she was need something for the discomfort in her hip we will start her on some Voltaren pills b.i.d. and as needed.        Review of Systems     Review of Systems   Musculoskeletal:  Positive for arthralgias and myalgias.        Medical / Social / Family History     Past Medical History:   Diagnosis Date    Diabetes mellitus, type 2     GERD (gastroesophageal reflux disease)     Hyperlipidemia     Hypertension        Past Surgical History:   Procedure Laterality Date    HIP REPLACEMENT ARTHROPLASTY Left     TUBAL LIGATION         Social History  Ms. Miller  reports that she has never smoked. She  has never been exposed to tobacco smoke. She has never used smokeless tobacco. She reports that she does not currently use alcohol. She reports that she does not use drugs.    Family History  Ms. Miller's family history includes Diabetes in her mother and sister; Heart disease in her mother; Hypertension in her mother.    Medications and Allergies     Medications  Outpatient Medications Marked as Taking for the 7/29/24 encounter (Office Visit) with Vrea Velez ACNP   Medication Sig Dispense Refill    aspirin (ECOTRIN) 81 MG EC tablet Take 1 tablet (81 mg total) by mouth once daily. 90 tablet 1    blood glucose control, low (TRUE METRIX LEVEL 1) Soln Inject 1 each into the skin Daily. 1 each 1    blood sugar diagnostic (TRUE METRIX GLUCOSE TEST STRIP) Strp Inject 1 each into the skin Daily. 200 strip 3    blood-glucose meter (TRUE METRIX AIR GLUCOSE METER) Misc Inject 1 each into the skin Daily. 1 each 1    clobetasoL (TEMOVATE) 0.05 % cream Apply topically 2 (two) times daily. Apply a thin layer to the affected area(s) 30 g 1    fluticasone propionate (FLONASE) 50 mcg/actuation nasal spray 2 sprays (100 mcg total) by Each Nostril route once daily. 16 g 2    glipiZIDE (GLUCOTROL) 2.5 MG TR24 Take 2 tablets (5 mg total) by mouth daily with breakfast. 90 tablet 1    hydroCHLOROthiazide (HYDRODIURIL) 25 MG tablet Take 1 tablet (25 mg total) by mouth once daily. 90 tablet 1    ibuprofen (ADVIL,MOTRIN) 800 MG tablet Take 1 tablet (800 mg total) by mouth every 6 (six) hours as needed for Pain. 60 tablet 2    lancets (TRUEPLUS LANCETS) 33 gauge Misc Inject 1 lancet  into the skin Daily. 200 each 3    lisinopriL 10 MG tablet Take 1 tablet (10 mg total) by mouth once daily. 90 tablet 1    loratadine (CLARITIN) 10 mg tablet Take 1 tablet (10 mg total) by mouth once daily. 90 tablet 1    omeprazole (PRILOSEC) 40 MG capsule Take 1 capsule (40 mg total) by mouth once daily. 90 capsule 1    polyethylene glycol (GLYCOLAX) 17  gram PwPk Take 17 g by mouth once daily. Mixed with 8 oz water, juice, soda, coffee or tea 90 packet 1    potassium chloride SA (K-DUR,KLOR-CON M) 10 MEQ tablet Take 1 tablet (10 mEq total) by mouth once daily. With food 90 tablet 1    pravastatin (PRAVACHOL) 20 MG tablet Take 1 tablet (20 mg total) by mouth every evening. 90 tablet 1    verapamiL (CALAN-SR) 240 MG CR tablet Take 1 tablet (240 mg total) by mouth once daily. With food 90 tablet 1       Allergies  Review of patient's allergies indicates:   Allergen Reactions    Pneumococcal vaccine Swelling       Physical Examination     Vitals:    07/29/24 1418   BP: 135/85   Pulse: 78   Temp: 98.7 °F (37.1 °C)     Physical Exam  Eyes:      Pupils: Pupils are equal, round, and reactive to light.   Cardiovascular:      Rate and Rhythm: Normal rate and regular rhythm.      Heart sounds: Normal heart sounds. No murmur heard.  Pulmonary:      Breath sounds: Normal breath sounds. No wheezing, rhonchi or rales.   Abdominal:      General: Bowel sounds are normal.   Musculoskeletal:         General: No swelling.      Cervical back: Normal range of motion and neck supple.   Skin:     General: Skin is warm and dry.   Neurological:      Mental Status: She is alert and oriented to person, place, and time.          Lab Results   Component Value Date    WBC 6.44 03/14/2024    HGB 13.0 03/14/2024    HCT 41.4 03/14/2024    MCV 98.6 (H) 03/14/2024     03/14/2024        Sodium   Date Value Ref Range Status   06/25/2024 138 136 - 145 mmol/L Final     Potassium   Date Value Ref Range Status   06/25/2024 3.4 (L) 3.5 - 5.1 mmol/L Final     Chloride   Date Value Ref Range Status   06/25/2024 102 98 - 107 mmol/L Final     CO2   Date Value Ref Range Status   06/25/2024 28 21 - 32 mmol/L Final     Glucose   Date Value Ref Range Status   06/25/2024 120 (H) 74 - 106 mg/dL Final     BUN   Date Value Ref Range Status   06/25/2024 17 7 - 18 mg/dL Final     Creatinine   Date Value Ref Range  "Status   06/25/2024 1.13 (H) 0.55 - 1.02 mg/dL Final     Calcium   Date Value Ref Range Status   06/25/2024 9.3 8.5 - 10.1 mg/dL Final     Total Protein   Date Value Ref Range Status   06/25/2024 8.2 6.4 - 8.2 g/dL Final     Albumin   Date Value Ref Range Status   06/25/2024 4.0 3.5 - 5.0 g/dL Final     Bilirubin, Total   Date Value Ref Range Status   06/25/2024 0.5 >0.0 - 1.2 mg/dL Final     Alk Phos   Date Value Ref Range Status   06/25/2024 65 55 - 142 U/L Final     AST   Date Value Ref Range Status   06/25/2024 9 (L) 15 - 37 U/L Final     ALT   Date Value Ref Range Status   06/25/2024 19 13 - 56 U/L Final     Anion Gap   Date Value Ref Range Status   06/25/2024 11 7 - 16 mmol/L Final     eGFR   Date Value Ref Range Status   06/25/2024 51 (L) >=60 mL/min/1.73m2 Final      Lab Results   Component Value Date    HGBA1C 5.2 06/25/2024      Lab Results   Component Value Date    CHOL 166 06/25/2024    CHOL 152 03/14/2024    CHOL 165 11/27/2023     Lab Results   Component Value Date    HDL 60 06/25/2024    HDL 63 (H) 03/14/2024    HDL 64 (H) 11/27/2023     Lab Results   Component Value Date    LDLCALC 90 06/25/2024    LDLCALC 77 03/14/2024    LDLCALC 85 11/27/2023     No results found for: "DLDL"  Lab Results   Component Value Date    TRIG 79 06/25/2024    TRIG 59 03/14/2024    TRIG 80 11/27/2023     Lab Results   Component Value Date    CHOLHDL 2.8 06/25/2024    CHOLHDL 2.4 03/14/2024    CHOLHDL 2.6 11/27/2023      Lab Results   Component Value Date    TSH 1.170 11/27/2023    FREET4 0.90 11/27/2023        Assessment and Plan (including Health Maintenance)      Problem List  Smart Sets  Document Outside HM   :    Plan:     1. Chronic left hip pain  -     diclofenac (VOLTAREN) 25 MG TbEC; Take 1 tablet (25 mg total) by mouth 2 (two) times daily.  Dispense: 60 tablet; Refill: 6         There are no Patient Instructions on file for this visit.     Health Maintenance Due   Topic Date Due    Shingles Vaccine (1 of 2) Never " done    Colorectal Cancer Screening  01/09/2022    TETANUS VACCINE  01/04/2023    Mammogram  08/21/2024         Health Maintenance Topics with due status: Not Due       Topic Last Completion Date    DEXA Scan 08/21/2023    Influenza Vaccine 12/06/2023    Diabetes Urine Screening 03/14/2024    Eye Exam 04/25/2024    Foot Exam 06/25/2024    Lipid Panel 06/25/2024    Hemoglobin A1c 06/25/2024    Low Dose Statin 07/29/2024       Future Appointments   Date Time Provider Department Center   8/26/2024 11:40 AM [facility] [place]   9/30/2024 10:20 AM Vera Velez ACNP [facility] [place]   2/4/2025 11:00 AM [facility] NURSE, [facility] [place]            Signature:  TU Sanchez  [facility] [address] [place] [address]  [phone]    Date of encounter: 7/29/24

## 2024-08-09 DIAGNOSIS — Z71.89 COMPLEX CARE COORDINATION: ICD-10-CM

## 2024-08-09 DIAGNOSIS — M70.61 TROCHANTERIC BURSITIS, RIGHT HIP: Primary | ICD-10-CM

## 2024-08-18 NOTE — PROGRESS NOTES
See Plan of Care       Sup Visit performed today with DAVID Kohler and DAVID Johnson.  All goals and treatment plan reviewed. Will work toward completion of all goals set.    First Treatment May Include :     Bike   SB  Hamstring Stretch on Stairs     Standing :   Standing at the Rail   Sit to Stand from Chair  Standing Marches   Hip Abduction - Bilateral   Heel Raises     Seated :  Marching   Hip Abduction   Hip Adduction   LAQ's     Supine :   Heel Slides   Straight Leg Raises   Hip Abduction   Bridges   Bridges with Abduction   Hooklying Hip ABD/ADD combo   Clams in Sidelying

## 2024-08-20 ENCOUNTER — CLINICAL SUPPORT (OUTPATIENT)
Dept: REHABILITATION | Facility: HOSPITAL | Age: 74
End: 2024-08-20
Attending: ORTHOPAEDIC SURGERY
Payer: MEDICARE

## 2024-08-20 DIAGNOSIS — R29.898 RIGHT LEG WEAKNESS: ICD-10-CM

## 2024-08-20 DIAGNOSIS — M70.61 GREATER TROCHANTERIC BURSITIS OF RIGHT HIP: Primary | ICD-10-CM

## 2024-08-20 DIAGNOSIS — M70.61 TROCHANTERIC BURSITIS, RIGHT HIP: ICD-10-CM

## 2024-08-20 PROCEDURE — 97162 PT EVAL MOD COMPLEX 30 MIN: CPT

## 2024-08-20 NOTE — PLAN OF CARE
[facility]   Physical Therapy Initial Evaluation      Name: Judy Miller  Clinic Number: [id number]    Therapy Diagnosis:Right Hip Trochanteric Bursitis  Physician: Trav Noriega MD    Physician Orders: PT Eval and Treat   Medical Diagnosis from Referral: Right Hip Trochanteric Bursitis   Evaluation Date: 8/20/2024  Authorization Period Expiration: [employer] Man[employer]   Plan of Care Expiration: 10/18/2024   Visit # / Visits authorized: 1/ [employer]    FOTO: 36/100    Precautions: Diabetes     Time In: 11:35 am  Time Out: 12:15 pm  Total Appointment Time (timed & untimed codes): 40 minutes    Subjective     Date of onset: 7/1/2024    History of current condition - Judy reports: she has been having right hip pain off and on for years but the last few months the pain in Right hip has increased significantly. She did see Mitali Osman MD and he diagnosed her with Right Hip Trochanteric Bursitis and ordered Physical Therapy. She also saw her PCP, Dr Vera Hoffman on 7/29/2024 and was given a prescription for Voltaren pills to be taken bid as needed. Voltaren did not help so she is now taking Ibuprofen 800. This helps some but she reports rest helps more than anything. She does reports some pins and needles feelings in bilateral calf and shin area but denies this in the feet or hands. She has PMH of Left DANE in 2012 and DM.       Patient saw her PCP, Vera Hoffman on 7/29/2024. MD note states in part :   Pt presents for acute on chronic hip pain. She reports she was recently seen by Orthopedics and was told that she had some inflammation and some arthritis in her hip but did not recommend surgery at this time. Patient reports that she was not given anything for pain or discomfort and was told to follow up with her primary care provider. Patient reports she was doing well otherwise she was need something for the discomfort in her hip we will start her on some Voltaren pills b.i.d.  "and as needed.    Falls: twice in the last 6 months - she felt like her right leg just suddenly gave out.     Imaging:   No recent X Ray of Right Hip on file here   Left Hip X Rays is as follows :   Three views left hip were obtained today demonstrating a well fixated femoral stem. The cup appears to be in slightly vertical position but appears to be intact. I see no discernible signs of loosening within the left hip prosthesis. Lumbar spine demonstrates severe degenerative changes present throughout L3-L4 and L5    Prior Therapy: None   Social History:  lives alone  Occupation: Retired   Prior Level of Function: Independent and active; likes to travel with her family  Current Level of Function: Has to use a cane for ambulation now due to the Right hip pain and the right leg giving out on her when walking. She has had two falls in the past 6 months due to Right leg giving out.     Pain:  Current 9/10, worst 10/10, best 0/10   Location: right hip    Description: Aching, Dull, Throbbing, and Sharp  Aggravating Factors: weight bearing activities; Movement of the hip   Easing Factors: pain medication and rest; she does have voltaren gel that helps a little     Patients goals: "I want to walk better and hurt less."     Medical History:   Past Medical History:   Diagnosis Date    Diabetes mellitus, type 2     GERD (gastroesophageal reflux disease)     Hyperlipidemia     Hypertension        Surgical History:   Judy Miller  has a past surgical history that includes Hip replacement arthroplasty (Left) and Tubal ligation.    Medications:   Judy has a current medication list which includes the following prescription(s): aspirin, true metrix level 1, blood sugar diagnostic, blood-glucose meter, clobetasol, diclofenac, escitalopram oxalate, fluticasone propionate, glipizide, hydrochlorothiazide, ibuprofen, lancets, lisinopril, loratadine, omeprazole, ondansetron, polyethylene glycol, potassium chloride sa, pravastatin, and " verapamil.    Allergies:   Review of patient's allergies indicates:   Allergen Reactions    Pneumococcal vaccine Swelling        Objective          HIP OBSERVATION        Left Lower Extremity  ROM/Strength Right lower Extremity     AROM PROM STRENGTH  AROM PROM STRENGTH   110   4+/5 HIP     Flexion (120) 90 100 3-/5   15              Extension (15) 0 5    45              Abduction (45) 15 20    10              Adduction (25) 10 15    20              Internal Rotation (30) 5 10    45              External Rotation (50) 20 25    120  5/5 KNEE Flexion (supine) 120   4+/5                 Flexion (prone)      0              Extension  0           Clinical Special Tests:  A. Hip  1. Leg length: right WITHIN FUNCTIONAL LIMITS left WITHIN FUNCTIONAL LIMITS  2. Guanaco test: right Positive left Negative  3. Hip scour test: right Positive left Negative  4. Piriformis test: right Positive left Negative  5. Jamey's test: right Positive left Negative  6. FABIR : right Positive left Negative  7. FADER : right Positive left Negative      Transfers:  difficulty with sit to stand especially from low surfaces     Ambulation:  Patient has decreased stance time and antalgic gait on the Right; Patient has to use a cane for ambulation now due to the Right hip pain and the right leg giving out on her when walking. She has had two falls in the past 6 months due to Right leg giving out.                     Limitation/Restriction for FOTO Intake Hip Survey    Therapist reviewed FOTO scores for Judy Miller on 8/20/2024.   FOTO documents entered into [employer] - see Media section.    Limitation Score: 64%             Patient Education     Education provided:   - Discussed the findings from the Evaluation and Reviewed the Plan of Care.    Assessment     Judy is a 74 y.o. female referred to outpatient Physical Therapy with a medical diagnosis of Right Trochanteric Hip Bursitis. Judy reports: she has been having right hip pain off and on for years  but the last few months the pain in Right hip has increased significantly. She did see Mitali Osman MD and he diagnosed her with Right Hip Trochanteric Bursitis and ordered Physical Therapy. She also saw her PCP, Dr Vera Hoffman on 7/29/2024 and was given a prescription for Voltaren pills to be taken bid as needed. Voltaren did not help so she is now taking Ibuprofen 800. This helps some but she reports rest helps more than anything. She does reports some pins and needles feelings in bilateral calf and shin area but denies this in the feet or hands. She has PMH of Left DANE in 2012 and DM.   Patient presents with severe pain in Right Hip. She reports pain is 9/10 today. She has very limited Range of Motion in the Right hip with most pain during external rotation and hip abduction. Patient has weakness in the Right hip in all directions. Special tests were positive for Guanaco test, scour test, FABIR, and FADER. She appears to have degeneration in the hip joint per special tests but there are no X Rays on file here. MD has diagnosed her with trochanteric bursitis. She was not overly tender to palpation over the greater trochanter today. Patient has difficulty with sit to stand especially from low surfaces. During ambulation, patient has decreased stance time and antalgic gait on the Right; Patient has to use a cane for ambulation now due to the Right hip pain and the right leg giving out on her when walking. She has had two falls in the past 6 months due to Right leg giving out.   Therapist will establish a Home Exercise Program but she was so painful today that no exercises were given. Over the next few visits we will attempt to establish her a Home Exercise Program that does not increase her pain.  Patient will require Physical Therapy Intervention to address all deficits and work toward completion of all goals set. Therapist will refer patient back to MD upon completion of Therapy for further assessment  and possible MRI if pain and dysfunction persist.         Patient prognosis is Guarded.   Patient will benefit from skilled outpatient Physical Therapy to address the deficits stated above and in the chart below, provide patient /family education, and to maximize patientt's level of independence.     Plan of care discussed with patient: Yes  Patient's spiritual, cultural and educational needs considered and patient is agreeable to the plan of care and goals as stated below:     Anticipated Barriers for therapy: Degeneration of the hip joint, likely need for DANE, need for MRI     Medical Necessity is demonstrated by the following  History  Co-morbidities and personal factors that may impact the plan of care [] LOW: no personal factors / co-morbidities  [] MODERATE: 1-2 personal factors / co-morbidities  [x] HIGH: 3+ personal factors / co-morbidities    Moderate / High Support Documentation:   Co-morbidities affecting plan of care:   Past Medical History:   Diagnosis Date    Diabetes mellitus, type 2     GERD (gastroesophageal reflux disease)     Hyperlipidemia     Hypertension        has a past surgical history that includes Hip replacement arthroplasty (Left) and Tubal ligation.  Personal Factors:   no deficits     Examination  Body Structures and Functions, activity limitations and participation restrictions that may impact the plan of care [] LOW: addressing 1-2 elements  [x] MODERATE: 3+ elements  [] HIGH: 4+ elements (please support below)    Moderate / High Support Documentation: severe pain in Right Hip. She reports pain is 9/10 today. She has very limited Range of Motion in the Right hip with most pain during external rotation and hip abduction. Patient has weakness in the Right hip in all directions. Special tests were positive for Guanaco test, scour test, FABIR, and FADER. She appears to have degeneration in the hip joint per special tests but there are no X Rays on file here. MD has diagnosed her with  trochanteric bursitis. She was not overly tender to palpation over the greater trochanter today. Patient has difficulty with sit to stand especially from low surfaces. During ambulation, patient has decreased stance time and antalgic gait on the Right; Patient has to use a cane for ambulation now due to the Right hip pain and the right leg giving out on her when walking. She has had two falls in the past 6 months due to Right leg giving out.   Therapist will establish a Home Exercise Program but she was so painful today that no exercises were given. Over the next few visits we will attempt to establish her a Home Exercise Program that does not increase her pain.     Clinical Presentation [] LOW: stable  [x] MODERATE: Evolving - Will likely require additional testing at the completion of Physical Therapy to determine the exact cause of patient's pain and dysfunction . Likely need for DANE soon   [] HIGH: Unstable     Decision Making/ Complexity Score: moderate       Goals:  Short Term Goals: 4 weeks   1. Independent with Home Exercise Program   2. Increase Right Hip Range of Motion to Within Functional Limits   3. Increase Right Hip Strength to grossly 3+/5  4. Patient will ambulate 500 feet without her quad cane and with complaints of Right Hip pain less than or equal to 5/10.    Long Term Goals: 8 weeks   1. Patient will increase Right Hip Strength to grossly 4/5  2. Patient will ambulate 1000+ feet without her quad cane and with complaints of Right Hip pain less than or equal to 4/10.    Plan     Plan of care Certification: 8/20/2024 to 10/18/2024.    Outpatient Physical Therapy 2 times weekly for 8 weeks to include the following interventions: 45929 [therapeutic exercise], 82117 [neuromuscular re-education], 50582 [gait training], 32450 [manual therapy], 61087 [therapeutic activities], and 11011 [unattended electrical stimulation]    MICHELE DEVLIN, PT, DPT       Clinical Information for Insurance Authorization      Dates of Services:  8/20/2024 to 10/18/2024   Discharge Plan: Patient will be discharged from skilled physical therapy treatment once all goals have been met, patient has plateaued, or physician/insurance requests discontinuation of care. Patient will be discharged with a home exercise program.   Type of therapy: Rehabilitative  ICD-10 Diagnosis Code(s): M70.61  Which side is symptomatic? Right  Surgical: No  Surgical procedure: N/A  Surgery date: N/A.  Presenting symptoms/diagnoses are repetitive in nature.  Presenting symptoms are non-radiating in nature.   The rehabilitation is not related to a diagnosis of cancer.  The rehabilitation is not related to a diagnosis of lymphedema.  Patient's clinical presentation is:  Severe objective and functional deficits: consistent intense symptoms with severe loss of range of motion, strength, or ability to perform daily tasks  CPT Codes Requested:  40420 [therapeutic exercise], 90196 [neuromuscular re-education], 39390 [gait training], 38260 [manual therapy], 96183 [therapeutic activities], and 31177 [unattended electrical stimulation]

## 2024-08-26 ENCOUNTER — OFFICE VISIT (OUTPATIENT)
Dept: FAMILY MEDICINE | Facility: CLINIC | Age: 74
End: 2024-08-26
Payer: MEDICARE

## 2024-08-26 ENCOUNTER — HOSPITAL ENCOUNTER (OUTPATIENT)
Dept: RADIOLOGY | Facility: HOSPITAL | Age: 74
Discharge: HOME | End: 2024-08-26
Attending: NURSE PRACTITIONER
Payer: MEDICARE

## 2024-08-26 VITALS
BODY MASS INDEX: 34.02 KG/M2 | DIASTOLIC BLOOD PRESSURE: 82 MMHG | TEMPERATURE: 98 F | HEART RATE: 91 BPM | WEIGHT: 211.69 LBS | OXYGEN SATURATION: 97 % | SYSTOLIC BLOOD PRESSURE: 127 MMHG | RESPIRATION RATE: 16 BRPM | HEIGHT: 66 IN

## 2024-08-26 VITALS — BODY MASS INDEX: 35.36 KG/M2 | WEIGHT: 220 LBS | HEIGHT: 66 IN

## 2024-08-26 DIAGNOSIS — R26.9 GAIT ABNORMALITY: ICD-10-CM

## 2024-08-26 DIAGNOSIS — I10 HYPERTENSION, UNSPECIFIED TYPE: ICD-10-CM

## 2024-08-26 DIAGNOSIS — M25.552 CHRONIC LEFT HIP PAIN: Primary | ICD-10-CM

## 2024-08-26 DIAGNOSIS — G89.29 CHRONIC LEFT HIP PAIN: Primary | ICD-10-CM

## 2024-08-26 DIAGNOSIS — Z12.31 BREAST CANCER SCREENING BY MAMMOGRAM: ICD-10-CM

## 2024-08-26 DIAGNOSIS — R29.898 RIGHT LEG WEAKNESS: ICD-10-CM

## 2024-08-26 DIAGNOSIS — M70.61 GREATER TROCHANTERIC BURSITIS OF RIGHT HIP: ICD-10-CM

## 2024-08-26 PROCEDURE — 3066F NEPHROPATHY DOC TX: CPT | Mod: ,,, | Performed by: NURSE PRACTITIONER

## 2024-08-26 PROCEDURE — 3079F DIAST BP 80-89 MM HG: CPT | Mod: ,,, | Performed by: NURSE PRACTITIONER

## 2024-08-26 PROCEDURE — 1159F MED LIST DOCD IN RCRD: CPT | Mod: ,,, | Performed by: NURSE PRACTITIONER

## 2024-08-26 PROCEDURE — 3061F NEG MICROALBUMINURIA REV: CPT | Mod: ,,, | Performed by: NURSE PRACTITIONER

## 2024-08-26 PROCEDURE — 1160F RVW MEDS BY RX/DR IN RCRD: CPT | Mod: ,,, | Performed by: NURSE PRACTITIONER

## 2024-08-26 PROCEDURE — 3288F FALL RISK ASSESSMENT DOCD: CPT | Mod: ,,, | Performed by: NURSE PRACTITIONER

## 2024-08-26 PROCEDURE — 4010F ACE/ARB THERAPY RXD/TAKEN: CPT | Mod: ,,, | Performed by: NURSE PRACTITIONER

## 2024-08-26 PROCEDURE — 3008F BODY MASS INDEX DOCD: CPT | Mod: ,,, | Performed by: NURSE PRACTITIONER

## 2024-08-26 PROCEDURE — 99212 OFFICE O/P EST SF 10 MIN: CPT | Mod: ,,, | Performed by: NURSE PRACTITIONER

## 2024-08-26 PROCEDURE — 77067 SCR MAMMO BI INCL CAD: CPT | Mod: TC

## 2024-08-26 PROCEDURE — 1100F PTFALLS ASSESS-DOCD GE2>/YR: CPT | Mod: ,,, | Performed by: NURSE PRACTITIONER

## 2024-08-26 PROCEDURE — 3074F SYST BP LT 130 MM HG: CPT | Mod: ,,, | Performed by: NURSE PRACTITIONER

## 2024-08-26 PROCEDURE — 3044F HG A1C LEVEL LT 7.0%: CPT | Mod: ,,, | Performed by: NURSE PRACTITIONER

## 2024-08-27 ENCOUNTER — CLINICAL SUPPORT (OUTPATIENT)
Dept: REHABILITATION | Facility: HOSPITAL | Age: 74
End: 2024-08-27
Payer: MEDICARE

## 2024-08-27 DIAGNOSIS — M70.61 GREATER TROCHANTERIC BURSITIS OF RIGHT HIP: Primary | ICD-10-CM

## 2024-08-27 DIAGNOSIS — R29.898 RIGHT LEG WEAKNESS: ICD-10-CM

## 2024-08-27 PROCEDURE — 97110 THERAPEUTIC EXERCISES: CPT

## 2024-08-27 PROCEDURE — 97530 THERAPEUTIC ACTIVITIES: CPT

## 2024-08-27 NOTE — PROGRESS NOTES
[facility]   Physical Therapy Treatment Note     Name: Judy Miller  Clinic Number: [id number]    Therapy Diagnosis:   Encounter Diagnoses   Name Primary?    Greater trochanteric bursitis of right hip Yes    Right leg weakness      Physician: Trav Noriega MD    Visit Date: 8/27/2024       Physician Orders: PT Eval and Treat   Medical Diagnosis from Referral: Right Hip Trochanteric Bursitis   Evaluation Date: 8/20/2024  Authorization Period Expiration: [employer] Man[employer]   Plan of Care Expiration: 10/18/2024   Visit # / Visits authorized: 2/ [employer]    FOTO: 36/100      PTA Visit #: 0/5    Time In: 2:35 pm   Time Out: 3:13 pm   Total Billable Time: 38 minutes    Precautions: Standard  Functional Level at time of Evaluation: Current Level of Function: Has to use a cane for ambulation now due to the Right hip pain and the right leg giving out on her when walking. She has had two falls in the past 6 months due to Right leg giving out.     Subjective     Pt reports: she is nervous about starting therapy.  She was  No Home Exercise Program established at this time     Pain: 7/10  Location: right hip      Objective       Judy received therapeutic exercises to develop strength, endurance, ROM, flexibility, posture, and core stabilization for 25 minutes including:    NuStep 5 Min   SB 4 x 15 sec hold   Hamstring Stretch on Stairs  4 x 15 sec hold           Supine :   Heel Slides   Straight Leg Raises   Hip Abduction x 10   Bridges 2 x 10   Bridges with Abduction   Hooklying Hip ABD/ADD combo 2 x 10   Clams in Sidelying  2 x 5           Judy received the following manual therapy techniques: Joint mobilizations, Manual traction, and Soft tissue Mobilization were applied to the: Right Hip for 0 minutes, including:      Judy participated in neuromuscular re-education activities to improve: Balance, Coordination, Kinesthetic, Proprioception, and Posture for 0 minutes. The following  activities were included:      Judy participated in dynamic functional therapeutic activities to improve functional performance for 13  minutes, including:    Standing :   Standing at the Rail   Sit to Stand from Chair  Standing Marches   Hip Abduction - Bilateral   Heel Raises      Seated :  Marching 2 x 10   Hip Abduction 2 x 10   Hip Adduction 2 x 10   LAQ's 2 x 10       Home Exercises Provided and Patient Education Provided     Education provided: Home Exercise Program has not yet been established     Written Home Exercises Provided:  Not yet .    See EMR under Patient Instructions for exercises provided prior visit.    Assessment     Today is the patient's first treatment since the Evaluation. Patient has Right hip pain of 7/10 at time of Therapy. During Evaluation it was 9/10 so this is a little better. Therapist initiated the Plan of Care and instructed patient on proper form for all of the exercises as listed above. She did well with the exercises but requires multiple rest breaks due to pain and decreased activity tolerance. Plan to add more exercises at the next visit provided she tolerated this session with no increased complaints of pain in the next few days.       PMH at time of Evaluation :   Judy is a 74 y.o. female referred to outpatient Physical Therapy with a medical diagnosis of Right Trochanteric Hip Bursitis. Judy reports: she has been having right hip pain off and on for years but the last few months the pain in Right hip has increased significantly. She did see Dr Trav Noriega, Ortho MD and he diagnosed her with Right Hip Trochanteric Bursitis and ordered Physical Therapy. She also saw her PCP, Dr Vera Hoffman on 7/29/2024 and was given a prescription for Voltaren pills to be taken bid as needed. Voltaren did not help so she is now taking Ibuprofen 800. This helps some but she reports rest helps more than anything. She does reports some pins and needles feelings in bilateral calf and shin  area but denies this in the feet or hands. She has PMH of Left DANE in 2012 and DM.   Patient presents with severe pain in Right Hip. She reports pain is 9/10 today. She has very limited Range of Motion in the Right hip with most pain during external rotation and hip abduction. Patient has weakness in the Right hip in all directions. Special tests were positive for Guanaco test, scour test, FABIR, and FADER. She appears to have degeneration in the hip joint per special tests but there are no X Rays on file here. MD has diagnosed her with trochanteric bursitis. She was not overly tender to palpation over the greater trochanter today. Patient has difficulty with sit to stand especially from low surfaces. During ambulation, patient has decreased stance time and antalgic gait on the Right; Patient has to use a cane for ambulation now due to the Right hip pain and the right leg giving out on her when walking. She has had two falls in the past 6 months due to Right leg giving out.   Therapist will establish a Home Exercise Program but she was so painful today that no exercises were given. Over the next few visits we will attempt to establish her a Home Exercise Program that does not increase her pain.  Patient will require Physical Therapy Intervention to address all deficits and work toward completion of all goals set. Therapist will refer patient back to MD upon completion of Therapy for further assessment and possible MRI if pain and dysfunction persist.     Judy Is progressing well towards her goals.   Pt prognosis is Fair.     Pt will continue to benefit from skilled outpatient physical therapy to address the deficits listed in the problem list box on initial evaluation, provide pt/family education and to maximize pt's level of independence in the home and community environment.     Pt's spiritual, cultural and educational needs considered and pt agreeable to plan of care and goals.     Anticipated Barriers for  therapy: Degeneration of the hip joint, likely need for DANE, need for MRI     Goals:  Short Term Goals: 4 weeks   1. Independent with Home Exercise Program   2. Increase Right Hip Range of Motion to Within Functional Limits   3. Increase Right Hip Strength to grossly 3+/5  4. Patient will ambulate 500 feet without her quad cane and with complaints of Right Hip pain less than or equal to 5/10.     Long Term Goals: 8 weeks   1. Patient will increase Right Hip Strength to grossly 4/5  2. Patient will ambulate 1000+ feet without her quad cane and with complaints of Right Hip pain less than or equal to 4/10.    Plan     Plan of care Certification: 8/20/2024 to 10/18/2024.     Outpatient Physical Therapy 2 times weekly for 8 weeks to include the following interventions: 38887 [therapeutic exercise], 26036 [neuromuscular re-education], 90513 [gait training], 59819 [manual therapy], 58788 [therapeutic activities], and 49611 [unattended electrical stimulation]    MICHELE DEVLIN, PT, DPT   8/27/2024

## 2024-08-27 NOTE — PROGRESS NOTES
TU Sanchez   [facility] [address] [place] [address]  [phone]      PATIENT NAME: Judy Miller  : 1950  DATE: 24  MRN: [id number]      Billing Provider: TU Sanchez  Level of Service:   Patient PCP Information       Provider PCP Type    TU Sanchez General            Reason for Visit / Chief Complaint: Follow-up (F/u needing [facility] completed for [facility])       Update PCP  Update Chief Complaint         History of Present Illness / Problem Focused Workflow     Judy Miller presents to the clinic with Follow-up (F/u needing [facility] completed for [facility])     Patient presents for routine evaluation and for evaluation of some paperwork for assistance at home.      Patient has a history of left hip pain status post left hip replacement.  She continues to have pain and discomfort in her hip with decreased activity tolerance.  She also is experiencing some right hip pain and has been seen by Orthopedics. She was Diagnosed with some bursitis and is following with physical therapy as well.  Patient has a very unsteady gait and has a history of recurrent falls.  She requires the use of a cane or a walker to assist with ambulation.  She has decreased activity tolerance due to the pain in his comfort and unsteady gait.  She was only able to ambulate 10-20 yd without stopping to rest due to the pain and discomfort in her hips.  Recommend she continue with physical therapy    Patient was likely benefit from home assistance.  She was requesting having [facility] to assist with some home care needs paperwork was completed        Review of Systems     Review of Systems   Constitutional:  Negative for fatigue and fever.   HENT:  Negative for nasal congestion and sore throat.    Eyes:  Negative for visual disturbance.   Respiratory:  Negative for chest tightness and  shortness of breath.    Cardiovascular:  Negative for chest pain and leg swelling.   Gastrointestinal:  Negative for abdominal pain and change in bowel habit.   Endocrine: Negative for polydipsia, polyphagia and polyuria.   Genitourinary:  Negative for dysuria and hematuria.   Musculoskeletal:  Positive for arthralgias, gait problem, leg pain and myalgias. Negative for back pain.   Integumentary:  Negative for rash.   Neurological:  Positive for weakness. Negative for dizziness, syncope and light-headedness.        Medical / Social / Family History     Past Medical History:   Diagnosis Date    Diabetes mellitus, type 2     GERD (gastroesophageal reflux disease)     Hyperlipidemia     Hypertension        Past Surgical History:   Procedure Laterality Date    HIP REPLACEMENT ARTHROPLASTY Left     TUBAL LIGATION         Social History  Ms. Miller  reports that she has never smoked. She has never been exposed to tobacco smoke. She has never used smokeless tobacco. She reports that she does not currently use alcohol. She reports that she does not use drugs.    Family History  Ms. Miller's family history includes Diabetes in her mother and sister; Heart disease in her mother; Hypertension in her mother.    Medications and Allergies     Medications  Outpatient Medications Marked as Taking for the 8/26/24 encounter (Office Visit) with Vera Velez ACNP   Medication Sig Dispense Refill    aspirin (ECOTRIN) 81 MG EC tablet Take 1 tablet (81 mg total) by mouth once daily. 90 tablet 1    blood glucose control, low (TRUE METRIX LEVEL 1) Soln Inject 1 each into the skin Daily. 1 each 1    blood sugar diagnostic (TRUE METRIX GLUCOSE TEST STRIP) Strp Inject 1 each into the skin Daily. 200 strip 3    blood-glucose meter (TRUE METRIX AIR GLUCOSE METER) Misc Inject 1 each into the skin Daily. 1 each 1    clobetasoL (TEMOVATE) 0.05 % cream Apply topically 2 (two) times daily. Apply a thin layer to the affected area(s) 30 g 1     diclofenac (VOLTAREN) 25 MG TbEC Take 1 tablet (25 mg total) by mouth 2 (two) times daily. 60 tablet 6    fluticasone propionate (FLONASE) 50 mcg/actuation nasal spray 2 sprays (100 mcg total) by Each Nostril route once daily. 16 g 2    glipiZIDE (GLUCOTROL) 2.5 MG TR24 Take 2 tablets (5 mg total) by mouth daily with breakfast. 90 tablet 1    hydroCHLOROthiazide (HYDRODIURIL) 25 MG tablet Take 1 tablet (25 mg total) by mouth once daily. 90 tablet 1    ibuprofen (ADVIL,MOTRIN) 800 MG tablet Take 1 tablet (800 mg total) by mouth every 6 (six) hours as needed for Pain. 60 tablet 2    lancets (TRUEPLUS LANCETS) 33 gauge Misc Inject 1 lancet  into the skin Daily. 200 each 3    lisinopriL 10 MG tablet Take 1 tablet (10 mg total) by mouth once daily. 90 tablet 1    loratadine (CLARITIN) 10 mg tablet Take 1 tablet (10 mg total) by mouth once daily. 90 tablet 1    omeprazole (PRILOSEC) 40 MG capsule Take 1 capsule (40 mg total) by mouth once daily. 90 capsule 1    ondansetron (ZOFRAN-ODT) 4 MG TbDL Take 1 tablet (4 mg total) by mouth every 8 (eight) hours as needed (for nausea/vomiting). 10 tablet 0    polyethylene glycol (GLYCOLAX) 17 gram PwPk Take 17 g by mouth once daily. Mixed with 8 oz water, juice, soda, coffee or tea 90 packet 1    potassium chloride SA (K-DUR,KLOR-CON M) 10 MEQ tablet Take 1 tablet (10 mEq total) by mouth once daily. With food 90 tablet 1    pravastatin (PRAVACHOL) 20 MG tablet Take 1 tablet (20 mg total) by mouth every evening. 90 tablet 1    verapamiL (CALAN-SR) 240 MG CR tablet Take 1 tablet (240 mg total) by mouth once daily. With food 90 tablet 1       Allergies  Review of patient's allergies indicates:   Allergen Reactions    Pneumococcal vaccine Swelling       Physical Examination     Vitals:    08/26/24 1349   BP: 127/82   Pulse:    Resp:    Temp:      Physical Exam  Eyes:      Pupils: Pupils are equal, round, and reactive to light.   Cardiovascular:      Rate and Rhythm: Normal rate and  regular rhythm.      Heart sounds: Normal heart sounds. No murmur heard.  Pulmonary:      Breath sounds: Normal breath sounds. No wheezing, rhonchi or rales.   Abdominal:      General: Bowel sounds are normal.   Musculoskeletal:         General: No swelling.      Cervical back: Normal range of motion and neck supple.   Skin:     General: Skin is warm and dry.   Neurological:      Mental Status: She is alert and oriented to person, place, and time.          Lab Results   Component Value Date    WBC 6.44 03/14/2024    HGB 13.0 03/14/2024    HCT 41.4 03/14/2024    MCV 98.6 (H) 03/14/2024     03/14/2024        Sodium   Date Value Ref Range Status   06/25/2024 138 136 - 145 mmol/L Final     Potassium   Date Value Ref Range Status   06/25/2024 3.4 (L) 3.5 - 5.1 mmol/L Final     Chloride   Date Value Ref Range Status   06/25/2024 102 98 - 107 mmol/L Final     CO2   Date Value Ref Range Status   06/25/2024 28 21 - 32 mmol/L Final     Glucose   Date Value Ref Range Status   06/25/2024 120 (H) 74 - 106 mg/dL Final     BUN   Date Value Ref Range Status   06/25/2024 17 7 - 18 mg/dL Final     Creatinine   Date Value Ref Range Status   06/25/2024 1.13 (H) 0.55 - 1.02 mg/dL Final     Calcium   Date Value Ref Range Status   06/25/2024 9.3 8.5 - 10.1 mg/dL Final     Total Protein   Date Value Ref Range Status   06/25/2024 8.2 6.4 - 8.2 g/dL Final     Albumin   Date Value Ref Range Status   06/25/2024 4.0 3.5 - 5.0 g/dL Final     Bilirubin, Total   Date Value Ref Range Status   06/25/2024 0.5 >0.0 - 1.2 mg/dL Final     Alk Phos   Date Value Ref Range Status   06/25/2024 65 55 - 142 U/L Final     AST   Date Value Ref Range Status   06/25/2024 9 (L) 15 - 37 U/L Final     ALT   Date Value Ref Range Status   06/25/2024 19 13 - 56 U/L Final     Anion Gap   Date Value Ref Range Status   06/25/2024 11 7 - 16 mmol/L Final     eGFR   Date Value Ref Range Status   06/25/2024 51 (L) >=60 mL/min/1.73m2 Final      Lab Results   Component  "Value Date    HGBA1C 5.2 06/25/2024      Lab Results   Component Value Date    CHOL 166 06/25/2024    CHOL 152 03/14/2024    CHOL 165 11/27/2023     Lab Results   Component Value Date    HDL 60 06/25/2024    HDL 63 (H) 03/14/2024    HDL 64 (H) 11/27/2023     Lab Results   Component Value Date    LDLCALC 90 06/25/2024    LDLCALC 77 03/14/2024    LDLCALC 85 11/27/2023     No results found for: "DLDL"  Lab Results   Component Value Date    TRIG 79 06/25/2024    TRIG 59 03/14/2024    TRIG 80 11/27/2023     Lab Results   Component Value Date    CHOLHDL 2.8 06/25/2024    CHOLHDL 2.4 03/14/2024    CHOLHDL 2.6 11/27/2023      Lab Results   Component Value Date    TSH 1.170 11/27/2023    FREET4 0.90 11/27/2023        Assessment and Plan (including Health Maintenance)      Problem List  Smart Sets  Document Outside HM   :    Plan:     1. Chronic left hip pain    2. Greater trochanteric bursitis of right hip    3. Right leg weakness    4. Hypertension, unspecified type    5. Gait abnormality         There are no Patient Instructions on file for this visit.     Health Maintenance Due   Topic Date Due    Shingles Vaccine (1 of 2) Never done    Colorectal Cancer Screening  01/09/2022    TETANUS VACCINE  01/04/2023         Health Maintenance Topics with due status: Not Due       Topic Last Completion Date    DEXA Scan 08/21/2023    Influenza Vaccine 12/06/2023    Diabetes Urine Screening 03/14/2024    Eye Exam 04/25/2024    Foot Exam 06/25/2024    Lipid Panel 06/25/2024    Hemoglobin A1c 06/25/2024    Low Dose Statin 08/26/2024    Mammogram 08/26/2024       Future Appointments   Date Time Provider Department Center   8/27/2024  2:30 PM Molly Davis, PT [facility]   9/4/2024  1:00 PM Molly Davis, PT [facility]   9/9/2024  1:00 [facility]   9/12/2024  1:45 PM Andi Bright, PTA [facility]   9/16/2024  2:30 PM Molly Davis, PT [facility]   9/19/2024  3:15 PM BrightAndi, PTA [facility] [place] [facility]   9/23/2024  3:15 PM Eaves, Molly H, PT [facility] [place] [facility]   9/26/2024  2:30 PM Eaves, Molly H, PT [facility] [place] [facility]   9/30/2024 10:20 AM Vera Velez ACNP [facility]   10/1/2024  2:30 PM Fito Atkins, PTA [facility] [place] [facility]    10/3/2024  2:30 PM Eaves, Molly H, PT [facility]    10/7/2024  2:30 PM Eaves, Molly H, PT [facility]    10/10/2024  2:30 PM Eaves, Molly H, PT [facility]    2/4/2025 11:00 AM AWV NURSE, [facility]   8/29/2025 11:20 AM [facility]            Signature:  TU Sanchez [facility]  [address] [place] [address] [place] [address]  [phone]    Date of encounter: 8/26/24

## 2024-09-04 ENCOUNTER — CLINICAL SUPPORT (OUTPATIENT)
Dept: REHABILITATION | Facility: HOSPITAL | Age: 74
End: 2024-09-04
Payer: MEDICARE

## 2024-09-04 DIAGNOSIS — M70.61 GREATER TROCHANTERIC BURSITIS OF RIGHT HIP: Primary | ICD-10-CM

## 2024-09-04 DIAGNOSIS — R29.898 RIGHT LEG WEAKNESS: ICD-10-CM

## 2024-09-04 PROCEDURE — 97530 THERAPEUTIC ACTIVITIES: CPT

## 2024-09-04 PROCEDURE — 97110 THERAPEUTIC EXERCISES: CPT

## 2024-09-04 NOTE — PROGRESS NOTES
[facility]   Physical Therapy Treatment Note     Name: Judy Miller  Clinic Number: [id number]    Therapy Diagnosis:   Encounter Diagnoses   Name Primary?    Greater trochanteric bursitis of right hip Yes    Right leg weakness      Physician: Trav Noriega MD    Visit Date: 9/4/2024       Physician Orders: PT Eval and Treat   Medical Diagnosis from Referral: Right Hip Trochanteric Bursitis   Evaluation Date: 8/20/2024  Authorization Period Expiration: [employer] Man[employer]   Plan of Care Expiration: 10/18/2024   Visit # / Visits authorized: 3/ [employer]    FOTO: 36/100      PTA Visit #: 0/5    Time In: 1:05 pm   Time Out: 1:45 pm   Total Billable Time: 40 minutes    Precautions: Standard  Functional Level at time of Evaluation: Current Level of Function: Has to use a cane for ambulation now due to the Right hip pain and the right leg giving out on her when walking. She has had two falls in the past 6 months due to Right leg giving out.     Subjective     Pt reports: being in hospital due to GI discomfort and was released yesterday after blood work was normal.   No Home Exercise Program established at this time     Pain: 7/10 with standing and no pain with sitting  Location: right hip      Objective       Judy received therapeutic exercises to develop strength, endurance, ROM, flexibility, posture, and core stabilization for 23 minutes including:    General warm-up with seated leg exercises  SB 4 x 15 sec hold   Hamstring Stretch on Stairs  4 x 15 sec hold           Supine :   Heel Slides   Straight Leg Raises 3 x 5  Hip Abduction x 10   Bridges 2 x 10   Bridges with Abduction   Hooklying Hip ABD/ADD combo 2 x 10   Clams in Sidelying  2 x 5           Judy received the following manual therapy techniques: Joint mobilizations, Manual traction, and Soft tissue Mobilization were applied to the: Right Hip for 0 minutes, including:      Judy participated in neuromuscular re-education  activities to improve: Balance, Coordination, Kinesthetic, Proprioception, and Posture for 0 minutes. The following activities were included:      Judy participated in dynamic functional therapeutic activities to improve functional performance for 17  minutes, including:    Standing :   Standing at the Rail   Sit to Stand from Chair 2 x 5  Standing Marches bilateral 2 x 10  Hip Abduction - right only due to pain with standing on right 2 x 10  Heel Raises 2 x 10     Seated :  Marching 2 x 10   Hip Abduction 2 x 10   Hip Adduction 2 x 10   LAQ's 2 x 10   AP's 2 x 10      Home Exercises Provided and Patient Education Provided     Education provided: Home Exercise Program has not yet been established     Written Home Exercises Provided:  Not yet .    See EMR under Patient Instructions for exercises provided prior visit.    Assessment     Patient is unable to perform single-leg stance on right due to pain. Patient was able to perform standing marches but painful with right hip flexion. Performed mat exercises for right hip strengthening. Patient performed exercises with minimal cues and some complaints of right hip pain. Will continue with PLAN OF CARE and progress as tolerated.        PMH at time of Evaluation :   Judy is a 74 y.o. female referred to outpatient Physical Therapy with a medical diagnosis of Right Trochanteric Hip Bursitis. Judy reports: she has been having right hip pain off and on for years but the last few months the pain in Right hip has increased significantly. She did see Mitali Osman MD and he diagnosed her with Right Hip Trochanteric Bursitis and ordered Physical Therapy. She also saw her PCP, Dr Vera Hoffman on 7/29/2024 and was given a prescription for Voltaren pills to be taken bid as needed. Voltaren did not help so she is now taking Ibuprofen 800. This helps some but she reports rest helps more than anything. She does reports some pins and needles feelings in bilateral calf and  shin area but denies this in the feet or hands. She has PMH of Left DANE in 2012 and DM.   Patient presents with severe pain in Right Hip. She reports pain is 9/10 today. She has very limited Range of Motion in the Right hip with most pain during external rotation and hip abduction. Patient has weakness in the Right hip in all directions. Special tests were positive for Guanaco test, scour test, FABIR, and FADER. She appears to have degeneration in the hip joint per special tests but there are no X Rays on file here. MD has diagnosed her with trochanteric bursitis. She was not overly tender to palpation over the greater trochanter today. Patient has difficulty with sit to stand especially from low surfaces. During ambulation, patient has decreased stance time and antalgic gait on the Right; Patient has to use a cane for ambulation now due to the Right hip pain and the right leg giving out on her when walking. She has had two falls in the past 6 months due to Right leg giving out.   Therapist will establish a Home Exercise Program but she was so painful today that no exercises were given. Over the next few visits we will attempt to establish her a Home Exercise Program that does not increase her pain.  Patient will require Physical Therapy Intervention to address all deficits and work toward completion of all goals set. Therapist will refer patient back to MD upon completion of Therapy for further assessment and possible MRI if pain and dysfunction persist.     Judy Is progressing well towards her goals.   Pt prognosis is Fair.     Pt will continue to benefit from skilled outpatient physical therapy to address the deficits listed in the problem list box on initial evaluation, provide pt/family education and to maximize pt's level of independence in the home and community environment.     Pt's spiritual, cultural and educational needs considered and pt agreeable to plan of care and goals.     Anticipated Barriers for  therapy: Degeneration of the hip joint, likely need for DANE, need for MRI     Goals:  Short Term Goals: 4 weeks   1. Independent with Home Exercise Program   2. Increase Right Hip Range of Motion to Within Functional Limits   3. Increase Right Hip Strength to grossly 3+/5  4. Patient will ambulate 500 feet without her quad cane and with complaints of Right Hip pain less than or equal to 5/10.     Long Term Goals: 8 weeks   1. Patient will increase Right Hip Strength to grossly 4/5  2. Patient will ambulate 1000+ feet without her quad cane and with complaints of Right Hip pain less than or equal to 4/10.    Plan     Plan of care Certification: 8/20/2024 to 10/18/2024.     Outpatient Physical Therapy 2 times weekly for 8 weeks to include the following interventions: 31865 [therapeutic exercise], 97829 [neuromuscular re-education], 02861 [gait training], 95885 [manual therapy], 34021 [therapeutic activities], and 21596 [unattended electrical stimulation]    TRINITY DEVLIN, PT   9/4/2024

## 2024-09-05 ENCOUNTER — OFFICE VISIT (OUTPATIENT)
Dept: FAMILY MEDICINE | Facility: CLINIC | Age: 74
End: 2024-09-05
Payer: MEDICARE

## 2024-09-05 VITALS
BODY MASS INDEX: 34.57 KG/M2 | WEIGHT: 215.13 LBS | TEMPERATURE: 98 F | DIASTOLIC BLOOD PRESSURE: 84 MMHG | HEIGHT: 66 IN | SYSTOLIC BLOOD PRESSURE: 125 MMHG | HEART RATE: 79 BPM | OXYGEN SATURATION: 98 % | RESPIRATION RATE: 18 BRPM

## 2024-09-05 DIAGNOSIS — Z09 HOSPITAL DISCHARGE FOLLOW-UP: Primary | ICD-10-CM

## 2024-09-05 NOTE — PROGRESS NOTES
TU Sanchez   [facility] [address] [place] [address]  [phone]      PATIENT NAME: Judy Miller  : 1950  DATE: 24  MRN: [id number]      Billing Provider: TU Sanchez  Level of Service:   Patient PCP Information       Provider PCP Type    TU Sanchez General            Reason for Visit / Chief Complaint: Hospital Follow Up       Update PCP  Update Chief Complaint         History of Present Illness / Problem Focused Workflow     Judy Miller presents to the clinic with Hospital Follow Up     Transitional Care Note    Family and/or Caretaker present at visit?  No.  Diagnostic tests reviewed/disposition: I have reviewed all completed as well as pending diagnostic tests at the time of discharge.   Dx carotid stenosis: will see Dr Cheung  Right hip pain: cont with PT and follow with ortho  Home health/community services discussion/referrals: Patient does not have home health established from hospital visit.  They do not need home health.  If needed, we will set up home health for the patient.   Establishment or re-establishment of referral orders for community resources: No other necessary community resources.   Discussion with other health care providers: No discussion with other health care providers necessary.     Chart  noted from hospitalization at [facility] were reviewed. Pt is aware of follow up with Dr Cheung in 2 weeks and with me for routine follow up in 1 month. Cont PT for hip pain and follow up with ortho as scheduled    Home meds were reviewed with pt. Medication changes reviewed with pt.              Review of Systems     Review of Systems   Constitutional:  Negative for fatigue and fever.   HENT:  Negative for nasal congestion and sore throat.    Eyes:  Negative for visual disturbance.   Respiratory:  Negative for chest tightness and shortness of breath.     Cardiovascular:  Negative for chest pain and leg swelling.   Gastrointestinal:  Negative for abdominal pain and change in bowel habit.   Endocrine: Negative for polydipsia, polyphagia and polyuria.   Genitourinary:  Negative for dysuria and hematuria.   Musculoskeletal:  Positive for gait problem and leg pain. Negative for back pain.   Integumentary:  Negative for rash.   Neurological:  Negative for dizziness, syncope, weakness and light-headedness.        Medical / Social / Family History     Past Medical History:   Diagnosis Date    Diabetes mellitus, type 2     GERD (gastroesophageal reflux disease)     Hyperlipidemia     Hypertension        Past Surgical History:   Procedure Laterality Date    HIP REPLACEMENT ARTHROPLASTY Left     TUBAL LIGATION         Social History  Ms. Miller  reports that she has never smoked. She has never been exposed to tobacco smoke. She has never used smokeless tobacco. She reports that she does not currently use alcohol. She reports that she does not use drugs.    Family History  Ms. Miller's family history includes Diabetes in her mother and sister; Heart disease in her mother; Hypertension in her mother.    Medications and Allergies     Medications  Outpatient Medications Marked as Taking for the 9/5/24 encounter (Office Visit) with Vera Velez ACNP   Medication Sig Dispense Refill    aspirin (ECOTRIN) 81 MG EC tablet Take 1 tablet (81 mg total) by mouth once daily. 90 tablet 1    blood glucose control, low (TRUE METRIX LEVEL 1) Soln Inject 1 each into the skin Daily. 1 each 1    blood sugar diagnostic (TRUE METRIX GLUCOSE TEST STRIP) Strp Inject 1 each into the skin Daily. 200 strip 3    blood-glucose meter (TRUE METRIX AIR GLUCOSE METER) Misc Inject 1 each into the skin Daily. 1 each 1    clobetasoL (TEMOVATE) 0.05 % cream Apply topically 2 (two) times daily. Apply a thin layer to the affected area(s) 30 g 1    diclofenac (VOLTAREN) 25 MG TbEC Take 1 tablet (25 mg total)  by mouth 2 (two) times daily. 60 tablet 6    fluticasone propionate (FLONASE) 50 mcg/actuation nasal spray 2 sprays (100 mcg total) by Each Nostril route once daily. 16 g 2    glipiZIDE (GLUCOTROL) 2.5 MG TR24 Take 2 tablets (5 mg total) by mouth daily with breakfast. 90 tablet 1    hydroCHLOROthiazide (HYDRODIURIL) 25 MG tablet Take 1 tablet (25 mg total) by mouth once daily. 90 tablet 1    ibuprofen (ADVIL,MOTRIN) 800 MG tablet Take 1 tablet (800 mg total) by mouth every 6 (six) hours as needed for Pain. 60 tablet 2    lancets (TRUEPLUS LANCETS) 33 gauge Misc Inject 1 lancet  into the skin Daily. 200 each 3    lisinopriL 10 MG tablet Take 1 tablet (10 mg total) by mouth once daily. 90 tablet 1    loratadine (CLARITIN) 10 mg tablet Take 1 tablet (10 mg total) by mouth once daily. 90 tablet 1    omeprazole (PRILOSEC) 40 MG capsule Take 1 capsule (40 mg total) by mouth once daily. 90 capsule 1    ondansetron (ZOFRAN-ODT) 4 MG TbDL Take 1 tablet (4 mg total) by mouth every 8 (eight) hours as needed (for nausea/vomiting). 10 tablet 0    polyethylene glycol (GLYCOLAX) 17 gram PwPk Take 17 g by mouth once daily. Mixed with 8 oz water, juice, soda, coffee or tea 90 packet 1    potassium chloride SA (K-DUR,KLOR-CON M) 10 MEQ tablet Take 1 tablet (10 mEq total) by mouth once daily. With food 90 tablet 1    pravastatin (PRAVACHOL) 20 MG tablet Take 1 tablet (20 mg total) by mouth every evening. 90 tablet 1    verapamiL (CALAN-SR) 240 MG CR tablet Take 1 tablet (240 mg total) by mouth once daily. With food 90 tablet 1       Allergies  Review of patient's allergies indicates:   Allergen Reactions    Pneumococcal vaccine Swelling       Physical Examination     Vitals:    09/05/24 1409   BP: 125/84   Pulse: 79   Resp: 18   Temp: 97.6 °F (36.4 °C)     Physical Exam  Eyes:      Pupils: Pupils are equal, round, and reactive to light.   Cardiovascular:      Rate and Rhythm: Normal rate and regular rhythm.      Heart sounds: Normal  heart sounds. No murmur heard.  Pulmonary:      Breath sounds: Normal breath sounds. No wheezing, rhonchi or rales.   Abdominal:      General: Bowel sounds are normal.      Palpations: Abdomen is soft.   Musculoskeletal:         General: No swelling.      Cervical back: Normal range of motion and neck supple.   Skin:     General: Skin is warm and dry.   Neurological:      Mental Status: She is alert and oriented to person, place, and time.          Lab Results   Component Value Date    WBC 6.44 03/14/2024    HGB 13.0 03/14/2024    HCT 41.4 03/14/2024    MCV 98.6 (H) 03/14/2024     03/14/2024        Sodium   Date Value Ref Range Status   06/25/2024 138 136 - 145 mmol/L Final     Potassium   Date Value Ref Range Status   06/25/2024 3.4 (L) 3.5 - 5.1 mmol/L Final     Chloride   Date Value Ref Range Status   06/25/2024 102 98 - 107 mmol/L Final     CO2   Date Value Ref Range Status   06/25/2024 28 21 - 32 mmol/L Final     Glucose   Date Value Ref Range Status   06/25/2024 120 (H) 74 - 106 mg/dL Final     BUN   Date Value Ref Range Status   06/25/2024 17 7 - 18 mg/dL Final     Creatinine   Date Value Ref Range Status   06/25/2024 1.13 (H) 0.55 - 1.02 mg/dL Final     Calcium   Date Value Ref Range Status   06/25/2024 9.3 8.5 - 10.1 mg/dL Final     Total Protein   Date Value Ref Range Status   06/25/2024 8.2 6.4 - 8.2 g/dL Final     Albumin   Date Value Ref Range Status   06/25/2024 4.0 3.5 - 5.0 g/dL Final     Bilirubin, Total   Date Value Ref Range Status   06/25/2024 0.5 >0.0 - 1.2 mg/dL Final     Alk Phos   Date Value Ref Range Status   06/25/2024 65 55 - 142 U/L Final     AST   Date Value Ref Range Status   06/25/2024 9 (L) 15 - 37 U/L Final     ALT   Date Value Ref Range Status   06/25/2024 19 13 - 56 U/L Final     Anion Gap   Date Value Ref Range Status   06/25/2024 11 7 - 16 mmol/L Final     eGFR   Date Value Ref Range Status   06/25/2024 51 (L) >=60 mL/min/1.73m2 Final      Lab Results   Component Value  "Date    HGBA1C 5.2 06/25/2024      Lab Results   Component Value Date    CHOL 166 06/25/2024    CHOL 152 03/14/2024    CHOL 165 11/27/2023     Lab Results   Component Value Date    HDL 60 06/25/2024    HDL 63 (H) 03/14/2024    HDL 64 (H) 11/27/2023     Lab Results   Component Value Date    LDLCALC 90 06/25/2024    LDLCALC 77 03/14/2024    LDLCALC 85 11/27/2023     No results found for: "DLDL"  Lab Results   Component Value Date    TRIG 79 06/25/2024    TRIG 59 03/14/2024    TRIG 80 11/27/2023     Lab Results   Component Value Date    CHOLHDL 2.8 06/25/2024    CHOLHDL 2.4 03/14/2024    CHOLHDL 2.6 11/27/2023      Lab Results   Component Value Date    TSH 1.170 11/27/2023    FREET4 0.90 11/27/2023        Assessment and Plan (including Health Maintenance)      Problem List  Smart Sets  Document Outside HM   :    Plan:     1. Hospital discharge follow-up         There are no Patient Instructions on file for this visit.     Health Maintenance Due   Topic Date Due    Shingles Vaccine (1 of 2) Never done    Colorectal Cancer Screening  01/09/2022    TETANUS VACCINE  01/04/2023    Influenza Vaccine (1) 09/01/2024         Health Maintenance Topics with due status: Not Due       Topic Last Completion Date    DEXA Scan 08/21/2023    Diabetes Urine Screening 03/14/2024    Eye Exam 04/25/2024    Foot Exam 06/25/2024    Lipid Panel 06/25/2024    Mammogram 08/26/2024    Hemoglobin A1c 08/29/2024    Low Dose Statin 09/05/2024       Future Appointments   Date Time Provider Department Center   9/9/2024  1:00 PM Fito Atkins PTA [facility]   9/12/2024  1:45 PM Andi Bright PTA [facility]   9/16/2024  2:30 PM [facility]   9/19/2024  1:30 PM [facility]   9/19/2024  3:15 PM [facility]   9/23/2024  3:15 PM [facility]   9/26/2024  2:30 PM [facility]   9/30/2024 10:20 AM Vera Velez ACNP [facility]   10/1/2024  2:30 PM Fito Atkins, PTA [facility]   10/3/2024  2:30 PM Eaves, Molly H, PT [facility]   10/7/2024  2:30 PM Andi Bright, PTA [facility]   10/10/2024  2:30 PM Eaves, Molly H, PT [facility]   2/4/2025 11:00 AM AWV NURSE, [facility]   8/29/2025 11:20 AM [facility]            Signature:  TU Sanchez  [facility] [address] [place] [address]  [phone]    Date of encounter: 9/5/24

## 2024-09-09 ENCOUNTER — CLINICAL SUPPORT (OUTPATIENT)
Dept: REHABILITATION | Facility: HOSPITAL | Age: 74
End: 2024-09-09
Payer: MEDICARE

## 2024-09-09 DIAGNOSIS — M70.61 GREATER TROCHANTERIC BURSITIS OF RIGHT HIP: Primary | ICD-10-CM

## 2024-09-09 DIAGNOSIS — R29.898 RIGHT LEG WEAKNESS: ICD-10-CM

## 2024-09-09 PROCEDURE — 97110 THERAPEUTIC EXERCISES: CPT | Mod: CQ

## 2024-09-09 PROCEDURE — 97112 NEUROMUSCULAR REEDUCATION: CPT | Mod: CQ

## 2024-09-09 NOTE — PROGRESS NOTES
[facility]   Physical Therapy Treatment Note     Name: Judy Miller  Clinic Number: [id number]    Therapy Diagnosis:   Encounter Diagnoses   Name Primary?    Greater trochanteric bursitis of right hip Yes    Right leg weakness      Physician: Trav Noriega MD    Visit Date: 9/9/2024       Physician Orders: PT Eval and Treat   Medical Diagnosis from Referral: Right Hip Trochanteric Bursitis   Evaluation Date: 8/20/2024  Authorization Period Expiration: [employer] Man[employer]   Plan of Care Expiration: 10/18/2024   Visit # / Visits authorized: 4 / [employer]    FOTO: 36/100      PTA Visit #: 1/5    Time In: 1:03 pm   Time Out: 1:35 pm   Total Billable Time: 40 minutes    Precautions: Standard  Functional Level at time of Evaluation: Current Level of Function: Has to use a cane for ambulation now due to the Right hip pain and the right leg giving out on her when walking. She has had two falls in the past 6 months due to Right leg giving out.     Subjective     Pt reports: 8/10 pain when WB; 0/10 when NWB    No Home Exercise Program established at this time     Pain: 7/10 with standing and no pain with sitting  Location: right hip      Objective       Judy received therapeutic exercises to develop strength, endurance, ROM, flexibility, posture, and core stabilization for 20 minutes including:    NuStep 5 Min   Hamstring Stretch on Stairs  4 x 15 sec hold  with belt           Supine :   Heel Slides   Straight Leg Raises 4 x 5 with belt   Hip Abduction x 30 green band   Bridges with Abduction   Hooklying Hip ABD/ADD combo 2 x 10   Supine March - green 15x (B)          Judy received the following manual therapy techniques: Joint mobilizations, Manual traction, and Soft tissue Mobilization were applied to the: Right Hip for 0 minutes, including:      Judy participated in neuromuscular re-education activities to improve: Balance, Coordination, Kinesthetic, Proprioception, and Posture for  10 minutes. The following activities were included:  Hip Abd (R) 20x  Hip Ext (R) 20x  Hip Flex (R) 20x  Bridges with Band - green 20x       Judy participated in dynamic functional therapeutic activities to improve functional performance for 0  minutes, including:    Standing :   Standing at the Rail   Sit to Stand from Chair 2 x 5  Standing Marches bilateral 2 x 10  Hip Abduction - right only due to pain with standing on right 2 x 10  Heel Raises 2 x 10     Seated :  Marching 2 x 10   Hip Abduction 2 x 10   Hip Adduction 2 x 10   LAQ's 2 x 10   AP's 2 x 10      Home Exercises Provided and Patient Education Provided     Education provided: Home Exercise Program has not yet been established     Written Home Exercises Provided:  Not yet .    See EMR under Patient Instructions for exercises provided prior visit.    Assessment     Progressed NWB strengthening and mobility exercises with fatigue noted. Pt has significant increased pain when WB through R LOWER EXTREMITY. Pt tolerated session well with no increased pain noted. Will progress as able.       PMH at time of Evaluation :   Judy is a 74 y.o. female referred to outpatient Physical Therapy with a medical diagnosis of Right Trochanteric Hip Bursitis. Judy reports: she has been having right hip pain off and on for years but the last few months the pain in Right hip has increased significantly. She did see Dr Trav Noriega Ortho MD and he diagnosed her with Right Hip Trochanteric Bursitis and ordered Physical Therapy. She also saw her PCP, Dr Vera Hoffman on 7/29/2024 and was given a prescription for Voltaren pills to be taken bid as needed. Voltaren did not help so she is now taking Ibuprofen 800. This helps some but she reports rest helps more than anything. She does reports some pins and needles feelings in bilateral calf and shin area but denies this in the feet or hands. She has PMH of Left DANE in 2012 and DM.   Patient presents with severe pain in Right  Hip. She reports pain is 9/10 today. She has very limited Range of Motion in the Right hip with most pain during external rotation and hip abduction. Patient has weakness in the Right hip in all directions. Special tests were positive for Guanaco test, scour test, FABIR, and FADER. She appears to have degeneration in the hip joint per special tests but there are no X Rays on file here. MD has diagnosed her with trochanteric bursitis. She was not overly tender to palpation over the greater trochanter today. Patient has difficulty with sit to stand especially from low surfaces. During ambulation, patient has decreased stance time and antalgic gait on the Right; Patient has to use a cane for ambulation now due to the Right hip pain and the right leg giving out on her when walking. She has had two falls in the past 6 months due to Right leg giving out.   Therapist will establish a Home Exercise Program but she was so painful today that no exercises were given. Over the next few visits we will attempt to establish her a Home Exercise Program that does not increase her pain.  Patient will require Physical Therapy Intervention to address all deficits and work toward completion of all goals set. Therapist will refer patient back to MD upon completion of Therapy for further assessment and possible MRI if pain and dysfunction persist.     Judy Is progressing well towards her goals.   Pt prognosis is Fair.     Pt will continue to benefit from skilled outpatient physical therapy to address the deficits listed in the problem list box on initial evaluation, provide pt/family education and to maximize pt's level of independence in the home and community environment.     Pt's spiritual, cultural and educational needs considered and pt agreeable to plan of care and goals.     Anticipated Barriers for therapy: Degeneration of the hip joint, likely need for DANE, need for MRI     Goals:  Short Term Goals: 4 weeks   1. Independent with  Home Exercise Program   2. Increase Right Hip Range of Motion to Within Functional Limits   3. Increase Right Hip Strength to grossly 3+/5  4. Patient will ambulate 500 feet without her quad cane and with complaints of Right Hip pain less than or equal to 5/10.     Long Term Goals: 8 weeks   1. Patient will increase Right Hip Strength to grossly 4/5  2. Patient will ambulate 1000+ feet without her quad cane and with complaints of Right Hip pain less than or equal to 4/10.    Plan     Plan of care Certification: 8/20/2024 to 10/18/2024.     Outpatient Physical Therapy 2 times weekly for 8 weeks to include the following interventions: 01156 [therapeutic exercise], 67955 [neuromuscular re-education], 24480 [gait training], 39233 [manual therapy], 57293 [therapeutic activities], and 92129 [unattended electrical stimulation]    Fito Atkins, PTA   9/9/2024

## 2024-09-19 ENCOUNTER — INITIAL CONSULT (OUTPATIENT)
Dept: VASCULAR SURGERY | Facility: CLINIC | Age: 74
End: 2024-09-19
Payer: OTHER GOVERNMENT

## 2024-09-19 VITALS — HEIGHT: 66 IN | WEIGHT: 215.19 LBS | BODY MASS INDEX: 34.58 KG/M2

## 2024-09-19 DIAGNOSIS — I65.21 RIGHT-SIDED EXTRACRANIAL CAROTID ARTERY STENOSIS: Primary | ICD-10-CM

## 2024-09-19 DIAGNOSIS — R55 SYNCOPE, UNSPECIFIED SYNCOPE TYPE: ICD-10-CM

## 2024-09-19 PROCEDURE — 99214 OFFICE O/P EST MOD 30 MIN: CPT | Mod: PBBFAC | Performed by: SURGERY

## 2024-09-19 PROCEDURE — 99203 OFFICE O/P NEW LOW 30 MIN: CPT | Mod: S$PBB,,, | Performed by: SURGERY

## 2024-09-19 PROCEDURE — 99999 PR PBB SHADOW E&M-EST. PATIENT-LVL IV: CPT | Mod: PBBFAC,,, | Performed by: SURGERY

## 2024-09-19 NOTE — PROGRESS NOTES
"Subjective:       Patient ID: Judy Miller is a 74 y.o. female.    Chief Complaint: Carotid Artery Disease  New patient carotid duplex normal, CTA suggests prior 75-80% distal common carotid disease.  We have discrepancy between the duplex in the  I am going to repeat her noninvasive studies [facility] she has never had a stroke that she knows before she did have a spell where she got lightheaded nauseated and had syncope.  Appears otherwise be a good candidate for carotid endarterectomy  family history includes Diabetes in her mother and sister; Heart disease in her mother; Hypertension in her mother.  Past Medical History:   Diagnosis Date    Diabetes mellitus, type 2     GERD (gastroesophageal reflux disease)     Hyperlipidemia     Hypertension       Past Surgical History:   Procedure Laterality Date    HIP REPLACEMENT ARTHROPLASTY Left     TUBAL LIGATION         reports that she has never smoked. She has never been exposed to tobacco smoke. She has never used smokeless tobacco. She reports that she does not currently use alcohol. She reports that she does not use drugs.   HPI  Review of Systems      Objective:      Ht 5' 6" (1.676 m)   Wt 97.6 kg (215 lb 2.7 oz)   BMI 34.73 kg/m²    Physical Exam  Constitutional:       Appearance: Normal appearance.   HENT:      Head: Normocephalic and atraumatic.   Cardiovascular:      Rate and Rhythm: Normal rate.   Pulmonary:      Effort: Pulmonary effort is normal.   Abdominal:      General: Abdomen is flat.      Palpations: Abdomen is soft.   Skin:     General: Skin is warm.   Neurological:      General: No focal deficit present.      Mental Status: She is alert.   Psychiatric:         Mood and Affect: Mood normal.         Behavior: Behavior normal.         Thought Content: Thought content normal.           Assessment:       No diagnosis found.    Plan:       Carotid duplex in the vascular lab follow-up so it is possible after above      "

## 2024-09-30 ENCOUNTER — OFFICE VISIT (OUTPATIENT)
Dept: FAMILY MEDICINE | Facility: CLINIC | Age: 74
End: 2024-09-30
Payer: MEDICARE

## 2024-09-30 VITALS
HEART RATE: 87 BPM | OXYGEN SATURATION: 97 % | RESPIRATION RATE: 14 BRPM | SYSTOLIC BLOOD PRESSURE: 127 MMHG | BODY MASS INDEX: 34.39 KG/M2 | TEMPERATURE: 98 F | HEIGHT: 66 IN | WEIGHT: 214 LBS | DIASTOLIC BLOOD PRESSURE: 84 MMHG

## 2024-09-30 DIAGNOSIS — M25.552 CHRONIC LEFT HIP PAIN: ICD-10-CM

## 2024-09-30 DIAGNOSIS — N18.31 CHRONIC KIDNEY DISEASE, STAGE 3A: ICD-10-CM

## 2024-09-30 DIAGNOSIS — E11.620 TYPE 2 DIABETES MELLITUS WITH DIABETIC DERMATITIS, WITHOUT LONG-TERM CURRENT USE OF INSULIN: ICD-10-CM

## 2024-09-30 DIAGNOSIS — E78.5 HYPERLIPIDEMIA, UNSPECIFIED HYPERLIPIDEMIA TYPE: ICD-10-CM

## 2024-09-30 DIAGNOSIS — G89.29 CHRONIC LEFT HIP PAIN: ICD-10-CM

## 2024-09-30 DIAGNOSIS — Z23 INFLUENZA VACCINATION ADMINISTERED AT CURRENT VISIT: ICD-10-CM

## 2024-09-30 DIAGNOSIS — I10 HYPERTENSION, UNSPECIFIED TYPE: Primary | ICD-10-CM

## 2024-09-30 LAB
ALBUMIN SERPL BCP-MCNC: 4 G/DL (ref 3.5–5)
ALBUMIN/GLOB SERPL: 0.9 {RATIO}
ALP SERPL-CCNC: 73 U/L (ref 55–142)
ALT SERPL W P-5'-P-CCNC: 27 U/L (ref 13–56)
ANION GAP SERPL CALCULATED.3IONS-SCNC: 11 MMOL/L (ref 7–16)
AST SERPL W P-5'-P-CCNC: 12 U/L (ref 15–37)
BILIRUB SERPL-MCNC: 0.7 MG/DL (ref ?–1.2)
BUN SERPL-MCNC: 16 MG/DL (ref 7–18)
BUN/CREAT SERPL: 19 (ref 6–20)
CALCIUM SERPL-MCNC: 9.1 MG/DL (ref 8.5–10.1)
CHLORIDE SERPL-SCNC: 102 MMOL/L (ref 98–107)
CHOLEST SERPL-MCNC: 150 MG/DL (ref 0–200)
CHOLEST/HDLC SERPL: 2.4 {RATIO}
CO2 SERPL-SCNC: 26 MMOL/L (ref 21–32)
CREAT SERPL-MCNC: 0.85 MG/DL (ref 0.55–1.02)
EGFR (NO RACE VARIABLE) (RUSH/TITUS): 72 ML/MIN/1.73M2
EST. AVERAGE GLUCOSE BLD GHB EST-MCNC: 100 MG/DL
GLOBULIN SER-MCNC: 4.3 G/DL (ref 2–4)
GLUCOSE SERPL-MCNC: 116 MG/DL (ref 74–106)
HBA1C MFR BLD HPLC: 5.1 % (ref 4.5–6.6)
HDLC SERPL-MCNC: 63 MG/DL (ref 40–60)
LDLC SERPL CALC-MCNC: 76 MG/DL
LDLC/HDLC SERPL: 1.2 {RATIO}
NONHDLC SERPL-MCNC: 87 MG/DL
POTASSIUM SERPL-SCNC: 3.3 MMOL/L (ref 3.5–5.1)
PROT SERPL-MCNC: 8.3 G/DL (ref 6.4–8.2)
SODIUM SERPL-SCNC: 136 MMOL/L (ref 136–145)
TRIGL SERPL-MCNC: 54 MG/DL (ref 35–150)
VLDLC SERPL-MCNC: 11 MG/DL

## 2024-09-30 PROCEDURE — 83036 HEMOGLOBIN GLYCOSYLATED A1C: CPT | Mod: ,,, | Performed by: CLINICAL MEDICAL LABORATORY

## 2024-09-30 PROCEDURE — 3074F SYST BP LT 130 MM HG: CPT | Mod: ,,, | Performed by: NURSE PRACTITIONER

## 2024-09-30 PROCEDURE — 3288F FALL RISK ASSESSMENT DOCD: CPT | Mod: ,,, | Performed by: NURSE PRACTITIONER

## 2024-09-30 PROCEDURE — 90653 IIV ADJUVANT VACCINE IM: CPT | Mod: ,,, | Performed by: NURSE PRACTITIONER

## 2024-09-30 PROCEDURE — 3008F BODY MASS INDEX DOCD: CPT | Mod: ,,, | Performed by: NURSE PRACTITIONER

## 2024-09-30 PROCEDURE — 3079F DIAST BP 80-89 MM HG: CPT | Mod: ,,, | Performed by: NURSE PRACTITIONER

## 2024-09-30 PROCEDURE — 1100F PTFALLS ASSESS-DOCD GE2>/YR: CPT | Mod: ,,, | Performed by: NURSE PRACTITIONER

## 2024-09-30 PROCEDURE — 3061F NEG MICROALBUMINURIA REV: CPT | Mod: ,,, | Performed by: NURSE PRACTITIONER

## 2024-09-30 PROCEDURE — 1159F MED LIST DOCD IN RCRD: CPT | Mod: ,,, | Performed by: NURSE PRACTITIONER

## 2024-09-30 PROCEDURE — 80053 COMPREHEN METABOLIC PANEL: CPT | Mod: ,,, | Performed by: CLINICAL MEDICAL LABORATORY

## 2024-09-30 PROCEDURE — 1160F RVW MEDS BY RX/DR IN RCRD: CPT | Mod: ,,, | Performed by: NURSE PRACTITIONER

## 2024-09-30 PROCEDURE — 80061 LIPID PANEL: CPT | Mod: ,,, | Performed by: CLINICAL MEDICAL LABORATORY

## 2024-09-30 PROCEDURE — 3044F HG A1C LEVEL LT 7.0%: CPT | Mod: ,,, | Performed by: NURSE PRACTITIONER

## 2024-09-30 PROCEDURE — G0008 ADMIN INFLUENZA VIRUS VAC: HCPCS | Mod: ,,, | Performed by: NURSE PRACTITIONER

## 2024-09-30 PROCEDURE — 99214 OFFICE O/P EST MOD 30 MIN: CPT | Mod: 25,,, | Performed by: NURSE PRACTITIONER

## 2024-09-30 PROCEDURE — 3066F NEPHROPATHY DOC TX: CPT | Mod: ,,, | Performed by: NURSE PRACTITIONER

## 2024-09-30 PROCEDURE — 4010F ACE/ARB THERAPY RXD/TAKEN: CPT | Mod: ,,, | Performed by: NURSE PRACTITIONER

## 2024-09-30 RX ORDER — POLYETHYLENE GLYCOL 3350 17 G/17G
17 POWDER, FOR SOLUTION ORAL DAILY
Qty: 90 PACKET | Refills: 1 | Status: SHIPPED | OUTPATIENT
Start: 2024-09-30

## 2024-09-30 RX ORDER — POTASSIUM CHLORIDE 750 MG/1
10 TABLET, EXTENDED RELEASE ORAL DAILY
Qty: 90 TABLET | Refills: 1 | Status: SHIPPED | OUTPATIENT
Start: 2024-09-30

## 2024-09-30 RX ORDER — LORATADINE 10 MG/1
10 TABLET ORAL DAILY
Qty: 90 TABLET | Refills: 1 | Status: SHIPPED | OUTPATIENT
Start: 2024-09-30

## 2024-09-30 RX ORDER — TRAZODONE HYDROCHLORIDE 50 MG/1
50 TABLET ORAL NIGHTLY
Qty: 30 TABLET | Refills: 11 | Status: SHIPPED | OUTPATIENT
Start: 2024-09-30 | End: 2025-09-30

## 2024-09-30 RX ORDER — LISINOPRIL 10 MG/1
10 TABLET ORAL DAILY
Qty: 90 TABLET | Refills: 1 | Status: SHIPPED | OUTPATIENT
Start: 2024-09-30

## 2024-09-30 RX ORDER — HYDROCHLOROTHIAZIDE 25 MG/1
25 TABLET ORAL DAILY
Qty: 90 TABLET | Refills: 1 | Status: SHIPPED | OUTPATIENT
Start: 2024-09-30

## 2024-09-30 RX ORDER — VERAPAMIL HCL 240 MG
240 TABLET, EXTENDED RELEASE ORAL DAILY
Qty: 90 TABLET | Refills: 1 | Status: SHIPPED | OUTPATIENT
Start: 2024-09-30

## 2024-09-30 RX ORDER — DICLOFENAC SODIUM 25 MG/1
25 TABLET, DELAYED RELEASE ORAL 2 TIMES DAILY
Qty: 60 TABLET | Refills: 6 | Status: SHIPPED | OUTPATIENT
Start: 2024-09-30

## 2024-09-30 RX ORDER — GLIPIZIDE 2.5 MG/1
5 TABLET, EXTENDED RELEASE ORAL
Qty: 90 TABLET | Refills: 1 | Status: SHIPPED | OUTPATIENT
Start: 2024-09-30

## 2024-09-30 RX ORDER — PRAVASTATIN SODIUM 20 MG/1
20 TABLET ORAL NIGHTLY
Qty: 90 TABLET | Refills: 1 | Status: SHIPPED | OUTPATIENT
Start: 2024-09-30

## 2024-09-30 RX ORDER — OMEPRAZOLE 40 MG/1
40 CAPSULE, DELAYED RELEASE ORAL DAILY
Qty: 90 CAPSULE | Refills: 1 | Status: SHIPPED | OUTPATIENT
Start: 2024-09-30 | End: 2025-09-30

## 2024-09-30 NOTE — PROGRESS NOTES
TU Sanchze   [facility] [id number] [place] [address]  [phone]      PATIENT NAME: Judy Miller  : 1950  DATE: 24  MRN: [id number]      Billing Provider: TU Sanchez  Level of Service:   Patient PCP Information       Provider PCP Type    TU Sanchez General            Reason for Visit / Chief Complaint: Diabetes and Hypertension (3 mos)       Update PCP  Update Chief Complaint         History of Present Illness / Problem Focused Workflow     Judy Miller presents to the clinic with Diabetes and Hypertension (3 mos)     Pt presents for routine follow up with lab and med refills. Overall doing well.      BP appears  controlled today. Home meds reviewed  The current medical regimen is effective;  continue present plan and medications. Recommended patient to check home readings to monitor and see me for followup as scheduled or sooner as needed.   Discussed sodium restriction, maintaining ideal body weight and regular exercise program as physiologic means to continue to achieve blood pressure control in addition to medication compliance.  Patient was educated that both decongestant and anti-inflammatory medication may raise blood pressure.  Advised to monitor BP at home. Advised on optimal BP readings - SBP < 130 & DBP < 80. Advised to call office for any persistent BP elevation and may have to prescribe or adjust BP med(s).  Recommended DASH diet, stay well hydrated with water daily, eliminate or decrease caffeinated and high calorie drinks, increase physical activity, and lose weight if BMI > 25.0. Written patient education information provided to patient with goals and recommendations to assist with BP management.     Discussed need for low fat/low cholesterol diet, regular exercise, and weight control.   Cardiovascular risk and specific lipid/LDL goals  reviewed.      Diabetes treatment goals: (unless otherwise indicated due to risk factor stratification)  To achieve normal or near normal glucose levels.  Fasting glucose:   Before meals: 100-140  2 hours after meal:less 130  Bedtime goal glucose > 100            Review of Systems     Review of Systems   Constitutional:  Negative for fatigue and fever.   HENT:  Negative for nasal congestion and sore throat.    Eyes:  Negative for visual disturbance.   Respiratory:  Negative for chest tightness and shortness of breath.    Cardiovascular:  Negative for chest pain and leg swelling.   Gastrointestinal:  Negative for abdominal pain and change in bowel habit.   Endocrine: Negative for polydipsia, polyphagia and polyuria.   Genitourinary:  Negative for dysuria and hematuria.   Musculoskeletal:  Negative for back pain and leg pain.   Integumentary:  Negative for rash.   Neurological:  Negative for dizziness, syncope, weakness and light-headedness.        Medical / Social / Family History     Past Medical History:   Diagnosis Date    Diabetes mellitus, type 2     GERD (gastroesophageal reflux disease)     Hyperlipidemia     Hypertension        Past Surgical History:   Procedure Laterality Date    HIP REPLACEMENT ARTHROPLASTY Left     TUBAL LIGATION         Social History  Ms. Miller  reports that she has never smoked. She has never been exposed to tobacco smoke. She has never used smokeless tobacco. She reports that she does not currently use alcohol. She reports that she does not use drugs.    Family History  Ms. Miller's family history includes Diabetes in her mother and sister; Heart disease in her mother; Hypertension in her mother.    Medications and Allergies     Medications  Outpatient Medications Marked as Taking for the 9/30/24 encounter (Office Visit) with Vera Velez ACNP   Medication Sig Dispense Refill    aspirin (ECOTRIN) 81 MG EC tablet Take 1 tablet (81 mg total) by mouth once daily. 90 tablet 1     blood glucose control, low (TRUE METRIX LEVEL 1) Soln Inject 1 each into the skin Daily. 1 each 1    blood sugar diagnostic (TRUE METRIX GLUCOSE TEST STRIP) Strp Inject 1 each into the skin Daily. 200 strip 3    blood-glucose meter (TRUE METRIX AIR GLUCOSE METER) Misc Inject 1 each into the skin Daily. 1 each 1    clobetasoL (TEMOVATE) 0.05 % cream Apply topically 2 (two) times daily. Apply a thin layer to the affected area(s) 30 g 1    fluticasone propionate (FLONASE) 50 mcg/actuation nasal spray 2 sprays (100 mcg total) by Each Nostril route once daily. 16 g 2    ibuprofen (ADVIL,MOTRIN) 800 MG tablet Take 1 tablet (800 mg total) by mouth every 6 (six) hours as needed for Pain. 60 tablet 2    lancets (TRUEPLUS LANCETS) 33 gauge Misc Inject 1 lancet  into the skin Daily. 200 each 3    [DISCONTINUED] diclofenac (VOLTAREN) 25 MG TbEC Take 1 tablet (25 mg total) by mouth 2 (two) times daily. 60 tablet 6    [DISCONTINUED] glipiZIDE (GLUCOTROL) 2.5 MG TR24 Take 2 tablets (5 mg total) by mouth daily with breakfast. 90 tablet 1    [DISCONTINUED] hydroCHLOROthiazide (HYDRODIURIL) 25 MG tablet Take 1 tablet (25 mg total) by mouth once daily. 90 tablet 1    [DISCONTINUED] lisinopriL 10 MG tablet Take 1 tablet (10 mg total) by mouth once daily. 90 tablet 1    [DISCONTINUED] loratadine (CLARITIN) 10 mg tablet Take 1 tablet (10 mg total) by mouth once daily. 90 tablet 1    [DISCONTINUED] omeprazole (PRILOSEC) 40 MG capsule Take 1 capsule (40 mg total) by mouth once daily. 90 capsule 1    [DISCONTINUED] polyethylene glycol (GLYCOLAX) 17 gram PwPk Take 17 g by mouth once daily. Mixed with 8 oz water, juice, soda, coffee or tea 90 packet 1    [DISCONTINUED] potassium chloride SA (K-DUR,KLOR-CON M) 10 MEQ tablet Take 1 tablet (10 mEq total) by mouth once daily. With food 90 tablet 1    [DISCONTINUED] pravastatin (PRAVACHOL) 20 MG tablet Take 1 tablet (20 mg total) by mouth every evening. 90 tablet 1    [DISCONTINUED] verapamiL  (CALAN-SR) 240 MG CR tablet Take 1 tablet (240 mg total) by mouth once daily. With food 90 tablet 1     Current Facility-Administered Medications for the 9/30/24 encounter (Office Visit) with Vera Velez ACNP   Medication Dose Route Frequency Provider Last Rate Last Admin    [COMPLETED] influenza (adjuvanted) (Fluad) 45 mcg/0.5 mL IM vaccine (> or = 64 yo) 0.5 mL  0.5 mL Intramuscular 1 time in Clinic/HOD Vera Velez ACNP   0.5 mL at 09/30/24 1013       Allergies  Review of patient's allergies indicates:   Allergen Reactions    Pneumococcal vaccine Swelling       Physical Examination     Vitals:    09/30/24 1013   BP: 127/84   Pulse: 87   Resp: 14   Temp: 98 °F (36.7 °C)     Physical Exam  Eyes:      Pupils: Pupils are equal, round, and reactive to light.   Cardiovascular:      Rate and Rhythm: Normal rate and regular rhythm.      Heart sounds: Normal heart sounds. No murmur heard.  Pulmonary:      Breath sounds: Normal breath sounds. No wheezing, rhonchi or rales.   Abdominal:      General: Bowel sounds are normal.      Palpations: Abdomen is soft.   Musculoskeletal:         General: No swelling.      Cervical back: Normal range of motion and neck supple.   Skin:     General: Skin is warm and dry.   Neurological:      Mental Status: She is alert and oriented to person, place, and time.          Lab Results   Component Value Date    WBC 6.44 03/14/2024    HGB 13.0 03/14/2024    HCT 41.4 03/14/2024    MCV 98.6 (H) 03/14/2024     03/14/2024        Sodium   Date Value Ref Range Status   06/25/2024 138 136 - 145 mmol/L Final     Potassium   Date Value Ref Range Status   06/25/2024 3.4 (L) 3.5 - 5.1 mmol/L Final     Chloride   Date Value Ref Range Status   06/25/2024 102 98 - 107 mmol/L Final     CO2   Date Value Ref Range Status   06/25/2024 28 21 - 32 mmol/L Final     Glucose   Date Value Ref Range Status   06/25/2024 120 (H) 74 - 106 mg/dL Final     BUN   Date Value Ref Range Status   06/25/2024  "17 7 - 18 mg/dL Final     Creatinine   Date Value Ref Range Status   06/25/2024 1.13 (H) 0.55 - 1.02 mg/dL Final     Calcium   Date Value Ref Range Status   06/25/2024 9.3 8.5 - 10.1 mg/dL Final     Total Protein   Date Value Ref Range Status   06/25/2024 8.2 6.4 - 8.2 g/dL Final     Albumin   Date Value Ref Range Status   06/25/2024 4.0 3.5 - 5.0 g/dL Final     Bilirubin, Total   Date Value Ref Range Status   06/25/2024 0.5 >0.0 - 1.2 mg/dL Final     Alk Phos   Date Value Ref Range Status   06/25/2024 65 55 - 142 U/L Final     AST   Date Value Ref Range Status   06/25/2024 9 (L) 15 - 37 U/L Final     ALT   Date Value Ref Range Status   06/25/2024 19 13 - 56 U/L Final     Anion Gap   Date Value Ref Range Status   06/25/2024 11 7 - 16 mmol/L Final     eGFR   Date Value Ref Range Status   06/25/2024 51 (L) >=60 mL/min/1.73m2 Final      Lab Results   Component Value Date    HGBA1C 5.2 06/25/2024      Lab Results   Component Value Date    CHOL 166 06/25/2024    CHOL 152 03/14/2024    CHOL 165 11/27/2023     Lab Results   Component Value Date    HDL 60 06/25/2024    HDL 63 (H) 03/14/2024    HDL 64 (H) 11/27/2023     Lab Results   Component Value Date    LDLCALC 90 06/25/2024    LDLCALC 77 03/14/2024    LDLCALC 85 11/27/2023     No results found for: "DLDL"  Lab Results   Component Value Date    TRIG 79 06/25/2024    TRIG 59 03/14/2024    TRIG 80 11/27/2023     Lab Results   Component Value Date    CHOLHDL 2.8 06/25/2024    CHOLHDL 2.4 03/14/2024    CHOLHDL 2.6 11/27/2023      Lab Results   Component Value Date    TSH 1.170 11/27/2023    FREET4 0.90 11/27/2023        Assessment and Plan (including Health Maintenance)      Problem List  Smart Sets  Document Outside HM   :    Plan:     1. Hypertension, unspecified type  Assessment & Plan:  BP Readings from Last 3 Encounters:   09/30/24 127/84   09/05/24 125/84   08/26/24 127/82    The current medical regimen is effective;  continue present plan and " medications.      Orders:  -     Comprehensive Metabolic Panel; Future; Expected date: 09/30/2024  -     hydroCHLOROthiazide (HYDRODIURIL) 25 MG tablet; Take 1 tablet (25 mg total) by mouth once daily.  Dispense: 90 tablet; Refill: 1  -     lisinopriL 10 MG tablet; Take 1 tablet (10 mg total) by mouth once daily.  Dispense: 90 tablet; Refill: 1  -     potassium chloride SA (K-DUR,KLOR-CON M) 10 MEQ tablet; Take 1 tablet (10 mEq total) by mouth once daily. With food  Dispense: 90 tablet; Refill: 1  -     verapamiL (CALAN-SR) 240 MG CR tablet; Take 1 tablet (240 mg total) by mouth once daily. With food  Dispense: 90 tablet; Refill: 1    2. Hyperlipidemia, unspecified hyperlipidemia type  Assessment & Plan:  Lab Results   Component Value Date    CHOL 166 06/25/2024    CHOL 152 03/14/2024    CHOL 165 11/27/2023     Lab Results   Component Value Date    HDL 60 06/25/2024    HDL 63 (H) 03/14/2024    HDL 64 (H) 11/27/2023     Lab Results   Component Value Date    LDLCALC 90 06/25/2024    LDLCALC 77 03/14/2024    LDLCALC 85 11/27/2023     Lab Results   Component Value Date    TRIG 79 06/25/2024    TRIG 59 03/14/2024    TRIG 80 11/27/2023       Lab Results   Component Value Date    CHOLHDL 2.8 06/25/2024    CHOLHDL 2.4 03/14/2024    CHOLHDL 2.6 11/27/2023    The current medical regimen is effective;  continue present plan and medications.      Orders:  -     Lipid Panel; Future; Expected date: 09/30/2024  -     pravastatin (PRAVACHOL) 20 MG tablet; Take 1 tablet (20 mg total) by mouth every evening.  Dispense: 90 tablet; Refill: 1    3. Type 2 diabetes mellitus with diabetic dermatitis, without long-term current use of insulin  Assessment & Plan:  Lab Results   Component Value Date    HGBA1C 5.2 06/25/2024    HGBA1C 5.1 11/27/2023    HGBA1C 5.0 07/27/2023     Lab Results   Component Value Date    MICROALBUR 0.6 03/14/2024    LDLCALC 90 06/25/2024    CREATININE 1.13 (H) 06/25/2024    The current medical regimen is effective;   continue present plan and medications.      Orders:  -     Hemoglobin A1C; Future; Expected date: 09/30/2024  -     glipiZIDE (GLUCOTROL) 2.5 MG TR24; Take 2 tablets (5 mg total) by mouth daily with breakfast.  Dispense: 90 tablet; Refill: 1    4. Chronic left hip pain  -     diclofenac (VOLTAREN) 25 MG TbEC; Take 1 tablet (25 mg total) by mouth 2 (two) times daily.  Dispense: 60 tablet; Refill: 6    5. Chronic kidney disease, stage 3a  Assessment & Plan:  Lab Results   Component Value Date    CREATININE 1.13 (H) 06/25/2024    BUN 17 06/25/2024     06/25/2024    K 3.4 (L) 06/25/2024     06/25/2024    CO2 28 06/25/2024    The current medical regimen is effective;  continue present plan and medications.        6. Influenza vaccination administered at current visit  -     influenza (adjuvanted) (Fluad) 45 mcg/0.5 mL IM vaccine (> or = 66 yo) 0.5 mL    Other orders  -     loratadine (CLARITIN) 10 mg tablet; Take 1 tablet (10 mg total) by mouth once daily.  Dispense: 90 tablet; Refill: 1  -     omeprazole (PRILOSEC) 40 MG capsule; Take 1 capsule (40 mg total) by mouth once daily.  Dispense: 90 capsule; Refill: 1  -     polyethylene glycol (GLYCOLAX) 17 gram PwPk; Take 17 g by mouth once daily. Mixed with 8 oz water, juice, soda, coffee or tea  Dispense: 90 packet; Refill: 1  -     traZODone (DESYREL) 50 MG tablet; Take 1 tablet (50 mg total) by mouth every evening.  Dispense: 30 tablet; Refill: 11         There are no Patient Instructions on file for this visit.     Health Maintenance Due   Topic Date Due    Colorectal Cancer Screening  01/09/2022         Health Maintenance Topics with due status: Not Due       Topic Last Completion Date    DEXA Scan 08/21/2023    Diabetes Urine Screening 03/14/2024    Eye Exam 04/25/2024    Foot Exam 06/25/2024    Lipid Panel 06/25/2024    Mammogram 08/26/2024    Hemoglobin A1c 08/29/2024    Low Dose Statin 09/30/2024       Future Appointments   Date Time Provider Department  Center   [facility]2024  [facility]   10/1/2024  1:30 PM Deb Resendiz ACNP [facility]SC[facility]   1/23/2025 10:00 AM Vera Velez ACNP [facility] FAMMED [facility]   2/4/2025 11:00 AM AWV NURSE, [facility]   8/29/2025 11:20 AM [facility] MAMMO2 [facility] MMIC [facility]            Signature:  TU Sanchez [facility] [address] [place] [address]  [phone]    Date of encounter: 9/30/24

## 2024-10-01 ENCOUNTER — OFFICE VISIT (OUTPATIENT)
Dept: VASCULAR SURGERY | Facility: CLINIC | Age: 74
End: 2024-10-01
Payer: MEDICARE

## 2024-10-01 ENCOUNTER — HOSPITAL ENCOUNTER (OUTPATIENT)
Dept: RADIOLOGY | Facility: HOSPITAL | Age: 74
Discharge: HOME | End: 2024-10-01
Attending: SURGERY
Payer: MEDICARE

## 2024-10-01 VITALS — WEIGHT: 214.06 LBS | HEIGHT: 66 IN | BODY MASS INDEX: 34.4 KG/M2

## 2024-10-01 DIAGNOSIS — I65.21 RIGHT-SIDED EXTRACRANIAL CAROTID ARTERY STENOSIS: ICD-10-CM

## 2024-10-01 DIAGNOSIS — I65.21 CAROTID STENOSIS, RIGHT: Primary | ICD-10-CM

## 2024-10-01 PROCEDURE — 3061F NEG MICROALBUMINURIA REV: CPT | Mod: CPTII,,, | Performed by: NURSE PRACTITIONER

## 2024-10-01 PROCEDURE — 1159F MED LIST DOCD IN RCRD: CPT | Mod: CPTII,,, | Performed by: NURSE PRACTITIONER

## 2024-10-01 PROCEDURE — 1101F PT FALLS ASSESS-DOCD LE1/YR: CPT | Mod: CPTII,,, | Performed by: NURSE PRACTITIONER

## 2024-10-01 PROCEDURE — 1160F RVW MEDS BY RX/DR IN RCRD: CPT | Mod: CPTII,,, | Performed by: NURSE PRACTITIONER

## 2024-10-01 PROCEDURE — 93880 EXTRACRANIAL BILAT STUDY: CPT | Mod: TC

## 2024-10-01 PROCEDURE — 99214 OFFICE O/P EST MOD 30 MIN: CPT | Mod: PBBFAC,25 | Performed by: NURSE PRACTITIONER

## 2024-10-01 PROCEDURE — 99999 PR PBB SHADOW E&M-EST. PATIENT-LVL IV: CPT | Mod: PBBFAC,,, | Performed by: NURSE PRACTITIONER

## 2024-10-01 PROCEDURE — 93880 EXTRACRANIAL BILAT STUDY: CPT | Mod: 26,,, | Performed by: SURGERY

## 2024-10-01 PROCEDURE — 3288F FALL RISK ASSESSMENT DOCD: CPT | Mod: CPTII,,, | Performed by: NURSE PRACTITIONER

## 2024-10-01 PROCEDURE — 3066F NEPHROPATHY DOC TX: CPT | Mod: CPTII,,, | Performed by: NURSE PRACTITIONER

## 2024-10-01 PROCEDURE — 99215 OFFICE O/P EST HI 40 MIN: CPT | Mod: S$PBB,,, | Performed by: NURSE PRACTITIONER

## 2024-10-01 PROCEDURE — 3008F BODY MASS INDEX DOCD: CPT | Mod: CPTII,,, | Performed by: NURSE PRACTITIONER

## 2024-10-01 PROCEDURE — 3044F HG A1C LEVEL LT 7.0%: CPT | Mod: CPTII,,, | Performed by: NURSE PRACTITIONER

## 2024-10-01 PROCEDURE — 4010F ACE/ARB THERAPY RXD/TAKEN: CPT | Mod: CPTII,,, | Performed by: NURSE PRACTITIONER

## 2024-10-01 PROCEDURE — 1126F AMNT PAIN NOTED NONE PRSNT: CPT | Mod: CPTII,,, | Performed by: NURSE PRACTITIONER

## 2024-10-01 RX ORDER — CLOPIDOGREL BISULFATE 75 MG/1
75 TABLET ORAL DAILY
COMMUNITY
Start: 2024-08-30

## 2024-10-02 ENCOUNTER — TELEPHONE (OUTPATIENT)
Dept: SURGERY | Facility: HOSPITAL | Age: 74
End: 2024-10-02
Payer: MEDICARE

## 2024-10-02 PROBLEM — I65.21 CAROTID STENOSIS, RIGHT: Status: ACTIVE | Noted: 2024-10-02

## 2024-10-02 NOTE — PROGRESS NOTES
Subjective:       Patient ID: Judy Miller is a 74 y.o. female.    Chief Complaint: Follow-up  10/01/2024  patient with asymptomatic severe right carotid stenosis  she was recently admitted at [facility] [place] operated by [facility] last month for syncopal episode, she denied chest pain shortness of breath or palpitations  CTA suggest 75-80% distal common carotid disease inpatient carotid duplex did not correlate with these findings she was initially evaluated by Dr. Cheung last week and he felt she would benefit from right carotid endarterectomy     Repeat carotid duplex today suggest elevated ICA velocities 350 to suggest greater than 70% carotid stenosis right ICA CCA ratio 3.20  left ICA CCA ratio 0.88 suggest less than 50% stenosis bilateral antegrade flow, patient is scheduled for right hip replacement with Dr. Noriega on October 14th discussed with patient and family member carotid disease would need to be addressed prior to hip replacement we will notify Dr. Noriega of recommendations  Her past medical history includes diabetes hypertension denies any known cardiac history denies chest pain or shortness of breath    family history includes Diabetes in her mother and sister; Heart disease in her mother; Hypertension in her mother.  Past Medical History:   Diagnosis Date    Diabetes mellitus, type 2     GERD (gastroesophageal reflux disease)     Hyperlipidemia     Hypertension       Past Surgical History:   Procedure Laterality Date    HIP REPLACEMENT ARTHROPLASTY Left     TUBAL LIGATION         reports that she has never smoked. She has never been exposed to tobacco smoke. She has never used smokeless tobacco. She reports that she does not currently use alcohol. She reports that she does not use drugs.   Follow-up  Pertinent negatives include no chest pain.     Review of Systems   Respiratory:  Negative for shortness of breath.    Cardiovascular:  Negative for chest pain and palpitations.   Neurological:  Positive for  "syncope.         Objective:      Ht 5' 6" (1.676 m)   Wt 97.1 kg (214 lb 1.1 oz)   BMI 34.55 kg/m²    Physical Exam  Vitals and nursing note reviewed.   Constitutional:       Appearance: Normal appearance.   HENT:      Head: Normocephalic.      Mouth/Throat:      Mouth: Mucous membranes are moist.   Eyes:      Conjunctiva/sclera: Conjunctivae normal.   Cardiovascular:      Rate and Rhythm: Normal rate and regular rhythm.   Pulmonary:      Effort: Pulmonary effort is normal.   Abdominal:      Palpations: Abdomen is soft.   Skin:     General: Skin is warm and dry.   Neurological:      Mental Status: She is alert and oriented to person, place, and time.   Psychiatric:         Mood and Affect: Mood normal.           Assessment:       1. Carotid stenosis, right        Plan:       Refer to Dr. Ramey at [facility] for cardiac clearance for right carotid endarterectomy at [facility] tentatively scheduled for October 9th with Dr. Cheung  We will notify Dr. Noriega plans for surgery    Hold plavix 7 days prior to CEA  "

## 2024-10-02 NOTE — TELEPHONE ENCOUNTER
Vascular Surgery    Appointment with [facility] cardiologist Dr. Martins tomorrow at 1:00 a.m. for cardiac clearance for right carotid endarterectomy  Pre admit mid appointment at [facility] Friday October 5th at 9:00 a.m. patient notified

## 2024-10-08 ENCOUNTER — TELEPHONE (OUTPATIENT)
Dept: SURGERY | Facility: HOSPITAL | Age: 74
End: 2024-10-08
Payer: MEDICARE

## 2024-10-08 RX ORDER — DEXTROSE 4 G
1 TABLET,CHEWABLE ORAL DAILY
Qty: 1 EACH | Refills: 1 | Status: SHIPPED | OUTPATIENT
Start: 2024-10-08

## 2024-10-08 NOTE — TELEPHONE ENCOUNTER
Vascular Surgery      Dr. Abilio CRISTOBAL cardiologist at [facility] deemed patient low risk for cardiac event with right CEA scheduled at [facility] tomorrow Dr. Cheung

## 2024-10-09 ENCOUNTER — OUTSIDE PLACE OF SERVICE (OUTPATIENT)
Dept: VASCULAR SURGERY | Facility: CLINIC | Age: 74
End: 2024-10-09
Payer: MEDICARE

## 2024-10-09 PROCEDURE — 35301 RECHANNELING OF ARTERY: CPT | Mod: RT,,, | Performed by: SURGERY

## 2024-10-10 ENCOUNTER — TELEPHONE (OUTPATIENT)
Dept: VASCULAR SURGERY | Facility: CLINIC | Age: 74
End: 2024-10-10
Payer: MEDICARE

## 2024-10-10 NOTE — TELEPHONE ENCOUNTER
Vascular     DC home POD 1 R CEA from [facility] Dr. Jonatan CRUZ scribed norco 7.5 mg #15

## 2024-10-15 ENCOUNTER — PATIENT OUTREACH (OUTPATIENT)
Dept: ADMINISTRATIVE | Facility: CLINIC | Age: 74
End: 2024-10-15

## 2024-10-15 ENCOUNTER — EXTERNAL HOSPITAL ADMISSION (OUTPATIENT)
Dept: ADMINISTRATIVE | Facility: CLINIC | Age: 74
End: 2024-10-15

## 2024-10-15 NOTE — PROGRESS NOTES
C3 nurse spoke with Judy Miller  for a TCC post hospital discharge follow up call. The patient has a scheduled HOSFU appointment with Vera Velez ACNP  on 10/23/24 @ 1400.

## 2024-10-17 ENCOUNTER — OFFICE VISIT (OUTPATIENT)
Dept: FAMILY MEDICINE | Facility: CLINIC | Age: 74
End: 2024-10-17
Payer: MEDICARE

## 2024-10-17 VITALS
OXYGEN SATURATION: 98 % | SYSTOLIC BLOOD PRESSURE: 137 MMHG | HEART RATE: 104 BPM | RESPIRATION RATE: 16 BRPM | WEIGHT: 209.69 LBS | TEMPERATURE: 98 F | BODY MASS INDEX: 33.7 KG/M2 | HEIGHT: 66 IN | DIASTOLIC BLOOD PRESSURE: 86 MMHG

## 2024-10-17 DIAGNOSIS — R53.1 GENERALIZED WEAKNESS: ICD-10-CM

## 2024-10-17 DIAGNOSIS — Z09 HOSPITAL DISCHARGE FOLLOW-UP: Primary | ICD-10-CM

## 2024-10-17 DIAGNOSIS — R26.9 GAIT ABNORMALITY: ICD-10-CM

## 2024-10-17 PROCEDURE — 3061F NEG MICROALBUMINURIA REV: CPT | Mod: ,,, | Performed by: NURSE PRACTITIONER

## 2024-10-17 PROCEDURE — 3066F NEPHROPATHY DOC TX: CPT | Mod: ,,, | Performed by: NURSE PRACTITIONER

## 2024-10-17 PROCEDURE — 3008F BODY MASS INDEX DOCD: CPT | Mod: ,,, | Performed by: NURSE PRACTITIONER

## 2024-10-17 PROCEDURE — 99213 OFFICE O/P EST LOW 20 MIN: CPT | Mod: ,,, | Performed by: NURSE PRACTITIONER

## 2024-10-17 PROCEDURE — 3044F HG A1C LEVEL LT 7.0%: CPT | Mod: ,,, | Performed by: NURSE PRACTITIONER

## 2024-10-17 PROCEDURE — 1159F MED LIST DOCD IN RCRD: CPT | Mod: ,,, | Performed by: NURSE PRACTITIONER

## 2024-10-17 PROCEDURE — 3079F DIAST BP 80-89 MM HG: CPT | Mod: ,,, | Performed by: NURSE PRACTITIONER

## 2024-10-17 PROCEDURE — 1160F RVW MEDS BY RX/DR IN RCRD: CPT | Mod: ,,, | Performed by: NURSE PRACTITIONER

## 2024-10-17 PROCEDURE — 4010F ACE/ARB THERAPY RXD/TAKEN: CPT | Mod: ,,, | Performed by: NURSE PRACTITIONER

## 2024-10-17 PROCEDURE — 1101F PT FALLS ASSESS-DOCD LE1/YR: CPT | Mod: ,,, | Performed by: NURSE PRACTITIONER

## 2024-10-17 PROCEDURE — 3075F SYST BP GE 130 - 139MM HG: CPT | Mod: ,,, | Performed by: NURSE PRACTITIONER

## 2024-10-17 PROCEDURE — 3288F FALL RISK ASSESSMENT DOCD: CPT | Mod: ,,, | Performed by: NURSE PRACTITIONER

## 2024-10-17 RX ORDER — ONDANSETRON 4 MG/1
4 TABLET, ORALLY DISINTEGRATING ORAL EVERY 6 HOURS PRN
Qty: 30 TABLET | Refills: 0 | Status: SHIPPED | OUTPATIENT
Start: 2024-10-17

## 2024-10-17 NOTE — PROGRESS NOTES
TU Sanchez   [facility] [address] [place] [address]  [phone]      PATIENT NAME: Judy Miller  : 1950  DATE: 10/17/24  MRN: [id number]      Billing Provider: TU Sanchez  Level of Service:   Patient PCP Information       Provider PCP Type    TU Sanchez General            Reason for Visit / Chief Complaint: Follow-up and Nausea (Hospital f/u)       Update PCP  Update Chief Complaint         History of Present Illness / Problem Focused Workflow     Judy Miller presents to the clinic with Follow-up and Nausea (Hospital f/u)            Patient presents for hospital discharge follow-up.  She recently had right carotid endarterectomy by Dr. Erik Cheung.  Patient reports overall she has been doing quite well she denies any dizziness but continues to have some just generalized weakness.  She denies any difficulty chewing or swallowing.  She was complaints of some skin irritation to her neck and the side of her face likely related to skin prep for surgery samples of some CeraVe were given for her to try.    Patient does use a walker to assist with ambulation.  She was requesting a bedside commode.  Patient was difficulty going from sitting to standing position due to chronic hip pain and generalized weakness.  Patient would greatly benefit from a bedside commode.    Home medications were reviewed there was concern whether or not the patient was actually taking her Plavix encouraged her to take all medications as prescribed she verbalized understanding and agreement.  She does have a follow up scheduled with vascular surgery next week.        Review of Systems     Review of Systems   Constitutional:  Negative for fatigue and fever.   HENT:  Negative for nasal congestion and sore throat.    Eyes:  Negative for visual disturbance.   Respiratory:  Negative for chest tightness and  shortness of breath.    Cardiovascular:  Negative for chest pain and leg swelling.   Gastrointestinal:  Negative for abdominal pain and change in bowel habit.   Endocrine: Negative for polydipsia, polyphagia and polyuria.   Genitourinary:  Negative for dysuria and hematuria.   Musculoskeletal:  Positive for gait problem, leg pain and myalgias. Negative for back pain.   Integumentary:  Positive for rash.   Neurological:  Negative for dizziness, syncope, weakness and light-headedness.        Medical / Social / Family History     Past Medical History:   Diagnosis Date    Diabetes mellitus, type 2     GERD (gastroesophageal reflux disease)     Hyperlipidemia     Hypertension        Past Surgical History:   Procedure Laterality Date    HIP REPLACEMENT ARTHROPLASTY Left     TUBAL LIGATION         Social History  Ms. Miller  reports that she has never smoked. She has never been exposed to tobacco smoke. She has never used smokeless tobacco. She reports that she does not currently use alcohol. She reports that she does not use drugs.    Family History  Ms. Miller's family history includes Diabetes in her mother and sister; Heart disease in her mother; Hypertension in her mother.    Medications and Allergies     Medications  Outpatient Medications Marked as Taking for the 10/17/24 encounter (Office Visit) with Vera Velez ACNP   Medication Sig Dispense Refill    aspirin (ECOTRIN) 81 MG EC tablet Take 1 tablet (81 mg total) by mouth once daily. 90 tablet 1    blood glucose control, low (TRUE METRIX LEVEL 1) Soln Inject 1 each into the skin Daily. 1 each 1    blood sugar diagnostic (TRUE METRIX GLUCOSE TEST STRIP) Strp Inject 1 each into the skin Daily. 200 strip 3    blood-glucose meter (TRUE METRIX AIR GLUCOSE METER) Misc Inject 1 each into the skin Daily. 1 each 1    clobetasoL (TEMOVATE) 0.05 % cream Apply topically 2 (two) times daily. Apply a thin layer to the affected area(s) 30 g 1    fluticasone propionate  (FLONASE) 50 mcg/actuation nasal spray 2 sprays (100 mcg total) by Each Nostril route once daily. 16 g 2    glipiZIDE (GLUCOTROL) 2.5 MG TR24 Take 2 tablets (5 mg total) by mouth daily with breakfast. 90 tablet 1    hydroCHLOROthiazide (HYDRODIURIL) 25 MG tablet Take 1 tablet (25 mg total) by mouth once daily. 90 tablet 1    HYDROcodone-acetaminophen (NORCO) 7.5-325 mg per tablet Take 1 tablet by mouth every 6 (six) hours as needed for Pain. 15 tablet 0    lancets (TRUEPLUS LANCETS) 33 gauge Misc Inject 1 lancet  into the skin Daily. 200 each 3    lisinopriL 10 MG tablet Take 1 tablet (10 mg total) by mouth once daily. 90 tablet 1    omeprazole (PRILOSEC) 40 MG capsule Take 1 capsule (40 mg total) by mouth once daily. 90 capsule 1    polyethylene glycol (GLYCOLAX) 17 gram PwPk Take 17 g by mouth once daily. Mixed with 8 oz water, juice, soda, coffee or tea 90 packet 1    potassium chloride SA (K-DUR,KLOR-CON M) 10 MEQ tablet Take 1 tablet (10 mEq total) by mouth once daily. With food 90 tablet 1    verapamiL (CALAN-SR) 240 MG CR tablet Take 1 tablet (240 mg total) by mouth once daily. With food 90 tablet 1       Allergies  Review of patient's allergies indicates:   Allergen Reactions    Pneumococcal vaccine Swelling       Physical Examination     Vitals:    10/17/24 0936   BP: 137/86   Pulse: 104   Resp: 16   Temp: 98 °F (36.7 °C)     Physical Exam  Eyes:      Pupils: Pupils are equal, round, and reactive to light.   Cardiovascular:      Rate and Rhythm: Normal rate and regular rhythm.      Heart sounds: Normal heart sounds. No murmur heard.  Pulmonary:      Breath sounds: Normal breath sounds. No wheezing, rhonchi or rales.   Abdominal:      General: Bowel sounds are normal.   Musculoskeletal:         General: No swelling.      Cervical back: Normal range of motion and neck supple.   Skin:     General: Skin is warm and dry.   Neurological:      Mental Status: She is alert and oriented to person, place, and time.  "         Lab Results   Component Value Date    WBC 6.44 03/14/2024    HGB 13.0 03/14/2024    HCT 41.4 03/14/2024    MCV 98.6 (H) 03/14/2024     03/14/2024        Sodium   Date Value Ref Range Status   09/30/2024 136 136 - 145 mmol/L Final     Potassium   Date Value Ref Range Status   09/30/2024 3.3 (L) 3.5 - 5.1 mmol/L Final     Chloride   Date Value Ref Range Status   09/30/2024 102 98 - 107 mmol/L Final     CO2   Date Value Ref Range Status   09/30/2024 26 21 - 32 mmol/L Final     Glucose   Date Value Ref Range Status   09/30/2024 116 (H) 74 - 106 mg/dL Final     BUN   Date Value Ref Range Status   09/30/2024 16 7 - 18 mg/dL Final     Creatinine   Date Value Ref Range Status   09/30/2024 0.85 0.55 - 1.02 mg/dL Final     Calcium   Date Value Ref Range Status   09/30/2024 9.1 8.5 - 10.1 mg/dL Final     Total Protein   Date Value Ref Range Status   09/30/2024 8.3 (H) 6.4 - 8.2 g/dL Final     Albumin   Date Value Ref Range Status   09/30/2024 4.0 3.5 - 5.0 g/dL Final     Bilirubin, Total   Date Value Ref Range Status   09/30/2024 0.7 >0.0 - 1.2 mg/dL Final     Alk Phos   Date Value Ref Range Status   09/30/2024 73 55 - 142 U/L Final     AST   Date Value Ref Range Status   09/30/2024 12 (L) 15 - 37 U/L Final     ALT   Date Value Ref Range Status   09/30/2024 27 13 - 56 U/L Final     Anion Gap   Date Value Ref Range Status   09/30/2024 11 7 - 16 mmol/L Final     eGFR   Date Value Ref Range Status   09/30/2024 72 >=60 mL/min/1.73m2 Final      Lab Results   Component Value Date    HGBA1C 5.1 09/30/2024      Lab Results   Component Value Date    CHOL 150 09/30/2024    CHOL 166 06/25/2024    CHOL 152 03/14/2024     Lab Results   Component Value Date    HDL 63 (H) 09/30/2024    HDL 60 06/25/2024    HDL 63 (H) 03/14/2024     Lab Results   Component Value Date    LDLCALC 76 09/30/2024    LDLCALC 90 06/25/2024    LDLCALC 77 03/14/2024     No results found for: "DLDL"  Lab Results   Component Value Date    TRIG 54 " 09/30/2024    TRIG 79 06/25/2024    TRIG 59 03/14/2024     Lab Results   Component Value Date    CHOLHDL 2.4 09/30/2024    CHOLHDL 2.8 06/25/2024    CHOLHDL 2.4 03/14/2024      Lab Results   Component Value Date    TSH 1.170 11/27/2023    FREET4 0.90 11/27/2023        Assessment and Plan (including Health Maintenance)      Problem List  Smart Sets  Document Outside HM   :    Plan:     1. Hospital discharge follow-up    2. Gait abnormality    3. Generalized weakness    Other orders  -     ondansetron (ZOFRAN-ODT) 4 MG TbDL; Take 1 tablet (4 mg total) by mouth every 6 (six) hours as needed (nausea).  Dispense: 30 tablet; Refill: 0         There are no Patient Instructions on file for this visit.     Health Maintenance Due   Topic Date Due    High Dose Statin  Never done    Colorectal Cancer Screening  01/09/2022         Health Maintenance Topics with due status: Not Due       Topic Last Completion Date    DEXA Scan 08/21/2023    Diabetes Urine Screening 03/14/2024    Eye Exam 04/25/2024    Foot Exam 06/25/2024    Mammogram 08/26/2024    Lipid Panel 09/30/2024    Hemoglobin A1c 09/30/2024    Aspirin/Antiplatelet Therapy 10/17/2024       Future Appointments   Date Time Provider Department Center   10/22/2024  2:15 PM Deb Resendiz ACNP [facility]   1/23/2025 10:00 AM Vera Velez ACNP [facility] ALICIA Sebastian Liz   2/4/2025 11:00 AM AWV NURSE, [facility] ROOSEVELTMED Davidnis Liz   8/29/2025 11:20 AM [facility] MAMMO2 OB [facility]            Signature:  TU Sanchez  [facility] [address] [place] [address]  [phone]    Date of encounter: 10/17/24

## 2024-10-21 ENCOUNTER — TELEPHONE (OUTPATIENT)
Dept: FAMILY MEDICINE | Facility: CLINIC | Age: 74
End: 2024-10-21
Payer: MEDICARE

## 2024-10-21 DIAGNOSIS — E11.620 TYPE 2 DIABETES MELLITUS WITH DIABETIC DERMATITIS, WITHOUT LONG-TERM CURRENT USE OF INSULIN: ICD-10-CM

## 2024-10-21 RX ORDER — GLIPIZIDE 2.5 MG/1
2.5 TABLET, EXTENDED RELEASE ORAL
Qty: 90 TABLET | Refills: 1 | Status: SHIPPED | OUTPATIENT
Start: 2024-10-21

## 2024-10-21 NOTE — TELEPHONE ENCOUNTER
----- Message from Nurse Noble sent at 10/21/2024  9:37 AM CDT -----  Regarding: FW: MEDICATION    ----- Message -----  From: Erica Bhatti  Sent: 10/21/2024   9:14 AM CDT  To: Kelly BHARDWAJ Staff  Subject: MEDICATION                                       Caller patient [phone]  RE: Medication  States she will be at home until about 11:30.  Please return call to patient    Thanks

## 2024-10-22 ENCOUNTER — OFFICE VISIT (OUTPATIENT)
Dept: VASCULAR SURGERY | Facility: CLINIC | Age: 74
End: 2024-10-22
Payer: MEDICARE

## 2024-10-22 VITALS — WEIGHT: 209.44 LBS | BODY MASS INDEX: 33.66 KG/M2 | HEIGHT: 66 IN

## 2024-10-22 DIAGNOSIS — I65.21 CAROTID STENOSIS, RIGHT: Primary | ICD-10-CM

## 2024-10-22 PROCEDURE — 3044F HG A1C LEVEL LT 7.0%: CPT | Mod: CPTII,,, | Performed by: NURSE PRACTITIONER

## 2024-10-22 PROCEDURE — 1160F RVW MEDS BY RX/DR IN RCRD: CPT | Mod: CPTII,,, | Performed by: NURSE PRACTITIONER

## 2024-10-22 PROCEDURE — 1101F PT FALLS ASSESS-DOCD LE1/YR: CPT | Mod: CPTII,,, | Performed by: NURSE PRACTITIONER

## 2024-10-22 PROCEDURE — 1159F MED LIST DOCD IN RCRD: CPT | Mod: CPTII,,, | Performed by: NURSE PRACTITIONER

## 2024-10-22 PROCEDURE — 99999 PR PBB SHADOW E&M-EST. PATIENT-LVL IV: CPT | Mod: PBBFAC,,, | Performed by: NURSE PRACTITIONER

## 2024-10-22 PROCEDURE — 3061F NEG MICROALBUMINURIA REV: CPT | Mod: CPTII,,, | Performed by: NURSE PRACTITIONER

## 2024-10-22 PROCEDURE — 3066F NEPHROPATHY DOC TX: CPT | Mod: CPTII,,, | Performed by: NURSE PRACTITIONER

## 2024-10-22 PROCEDURE — 4010F ACE/ARB THERAPY RXD/TAKEN: CPT | Mod: CPTII,,, | Performed by: NURSE PRACTITIONER

## 2024-10-22 PROCEDURE — 3288F FALL RISK ASSESSMENT DOCD: CPT | Mod: CPTII,,, | Performed by: NURSE PRACTITIONER

## 2024-10-22 PROCEDURE — 99024 POSTOP FOLLOW-UP VISIT: CPT | Mod: ,,, | Performed by: NURSE PRACTITIONER

## 2024-10-22 PROCEDURE — 99214 OFFICE O/P EST MOD 30 MIN: CPT | Mod: PBBFAC | Performed by: NURSE PRACTITIONER

## 2024-10-22 NOTE — PROGRESS NOTES
"Subjective:       Patient ID: Judy Miller is a 74 y.o. female.    Chief Complaint: Post-op Evaluation (CEA)  [id number]  patient with asymptomatic severe right carotid stenosis one-week postop right carotid endarterectomy at [facility] by Dr. Cheung incision healing well Steri-Strips partially intact no signs of infection neurologically intact no bleeding no hematoma, upper chest mild erythema reports itching from bandage    family history includes Diabetes in her mother and sister; Heart disease in her mother; Hypertension in her mother.  Past Medical History:   Diagnosis Date    Diabetes mellitus, type 2     GERD (gastroesophageal reflux disease)     Hyperlipidemia     Hypertension       Past Surgical History:   Procedure Laterality Date    HIP REPLACEMENT ARTHROPLASTY Left     TUBAL LIGATION         reports that she has never smoked. She has never been exposed to tobacco smoke. She has never used smokeless tobacco. She reports that she does not currently use alcohol. She reports that she does not use drugs.   HPI  Review of Systems      Objective:      Ht 5' 6" (1.676 m)   Wt 95 kg (209 lb 7 oz)   BMI 33.80 kg/m²    Physical Exam  Vitals and nursing note reviewed.   Constitutional:       Appearance: Normal appearance.   HENT:      Head: Normocephalic.      Mouth/Throat:      Mouth: Mucous membranes are moist.   Eyes:      Conjunctiva/sclera: Conjunctivae normal.   Neck:      Comments: Oblique right neck incision healing well  Cardiovascular:      Rate and Rhythm: Normal rate and regular rhythm.   Pulmonary:      Effort: Pulmonary effort is normal.   Abdominal:      Palpations: Abdomen is soft.   Skin:     General: Skin is warm and dry.   Neurological:      General: No focal deficit present.      Mental Status: She is alert and oriented to person, place, and time. Mental status is at baseline.   Psychiatric:         Mood and Affect: Mood normal.           Assessment:       1. Carotid stenosis, right        Plan: "       Continue aspirin daily  May take Benadryl p.o. p.r.n. for itching  Follow-up in 4 weeks with postop carotid duplex  She is doing well from vascular standpoint okay to proceed with hip surgery with Dr. Noriega

## 2025-01-22 NOTE — ASSESSMENT & PLAN NOTE
BP Readings from Last 3 Encounters:   09/30/24 127/84   09/05/24 125/84   08/26/24 127/82    The current medical regimen is effective;  continue present plan and medications.    
Lab Results   Component Value Date    CHOL 166 06/25/2024    CHOL 152 03/14/2024    CHOL 165 11/27/2023     Lab Results   Component Value Date    HDL 60 06/25/2024    HDL 63 (H) 03/14/2024    HDL 64 (H) 11/27/2023     Lab Results   Component Value Date    LDLCALC 90 06/25/2024    LDLCALC 77 03/14/2024    LDLCALC 85 11/27/2023     Lab Results   Component Value Date    TRIG 79 06/25/2024    TRIG 59 03/14/2024    TRIG 80 11/27/2023       Lab Results   Component Value Date    CHOLHDL 2.8 06/25/2024    CHOLHDL 2.4 03/14/2024    CHOLHDL 2.6 11/27/2023    The current medical regimen is effective;  continue present plan and medications.    
Lab Results   Component Value Date    CREATININE 1.13 (H) 06/25/2024    BUN 17 06/25/2024     06/25/2024    K 3.4 (L) 06/25/2024     06/25/2024    CO2 28 06/25/2024    The current medical regimen is effective;  continue present plan and medications.    
Lab Results   Component Value Date    HGBA1C 5.2 06/25/2024    HGBA1C 5.1 11/27/2023    HGBA1C 5.0 07/27/2023     Lab Results   Component Value Date    MICROALBUR 0.6 03/14/2024    LDLCALC 90 06/25/2024    CREATININE 1.13 (H) 06/25/2024    The current medical regimen is effective;  continue present plan and medications.    
Home/with Baby